# Patient Record
Sex: FEMALE | Race: WHITE | Employment: FULL TIME | URBAN - NONMETROPOLITAN AREA
[De-identification: names, ages, dates, MRNs, and addresses within clinical notes are randomized per-mention and may not be internally consistent; named-entity substitution may affect disease eponyms.]

---

## 2017-03-21 DIAGNOSIS — I10 ESSENTIAL HYPERTENSION WITH GOAL BLOOD PRESSURE LESS THAN 140/90: ICD-10-CM

## 2017-03-21 NOTE — TELEPHONE ENCOUNTER
diovan/losartan      Last Written Prescription Date: 7/20/16  Last Fill Quantity: 90, # refills: 3  Last Office Visit with [facility], [facility] or [facility] prescribing provider: 8/15/16       Potassium   Date Value Ref Range Status   07/20/2016 3.9 3.4 - 5.3 mmol/L Final     Creatinine   Date Value Ref Range Status   07/20/2016 0.75 0.52 - 1.04 mg/dL Final     BP Readings from Last 3 Encounters:   08/15/16 126/78   07/20/16 118/77   02/11/16 137/87

## 2017-03-21 NOTE — TELEPHONE ENCOUNTER
Reason for call:  Medication    1. Medication Name? valsartan-hydrochlorothiazide (DIOVAN-HCT) 80-12.5 MG per tablet  2. Is this request for a refill? No  3. What Pharmacy do you use? [facility]  in [place]  4. Have you contacted your pharmacy? Yes    5. If yes, when?  (Please note that the turn-around-time for prescriptions is 72 business hours; I am sending your request at this time. SEND TO  [facility]  )  Description: Pt called and states that her coverage at [facility] has ended and that she would like her prescription sent to the [facility] in [place], she also states that she is getting the 30 day one and her insurance will not cover it now unless it is a 90 day prescription instead. She said she would like a call back regarding this.   Was an appointment offered for this a call? No   Preferred method for responding to this messageTelephone Cubw-[phone]  If we cannot reach you directly, may we leave a detailed response at the number you provided? Yes  Can this message wait until your PCP/Provider returns if not available today? n/a

## 2017-03-23 RX ORDER — VALSARTAN AND HYDROCHLOROTHIAZIDE 80; 12.5 MG/1; MG/1
1 TABLET, FILM COATED ORAL DAILY
Qty: 90 TABLET | Refills: 0 | Status: SHIPPED | OUTPATIENT
Start: 2017-03-23 | End: 2017-06-21

## 2017-06-21 DIAGNOSIS — I10 ESSENTIAL HYPERTENSION WITH GOAL BLOOD PRESSURE LESS THAN 140/90: ICD-10-CM

## 2017-06-26 RX ORDER — VALSARTAN AND HYDROCHLOROTHIAZIDE 80; 12.5 MG/1; MG/1
TABLET, FILM COATED ORAL
Qty: 90 TABLET | Refills: 0 | Status: SHIPPED | OUTPATIENT
Start: 2017-06-26 | End: 2017-09-21

## 2017-08-10 ENCOUNTER — OFFICE VISIT (OUTPATIENT)
Dept: OBGYN | Facility: OTHER | Age: 47
End: 2017-08-10
Attending: NURSE PRACTITIONER
Payer: COMMERCIAL

## 2017-08-10 VITALS
HEART RATE: 64 BPM | WEIGHT: 220 LBS | TEMPERATURE: 98.1 F | BODY MASS INDEX: 32.58 KG/M2 | DIASTOLIC BLOOD PRESSURE: 84 MMHG | HEIGHT: 69 IN | SYSTOLIC BLOOD PRESSURE: 122 MMHG

## 2017-08-10 DIAGNOSIS — R10.2 PELVIC PAIN IN FEMALE: ICD-10-CM

## 2017-08-10 DIAGNOSIS — R10.9 ABDOMINAL CRAMPS: Primary | ICD-10-CM

## 2017-08-10 DIAGNOSIS — R10.9 ABDOMINAL CRAMPS: ICD-10-CM

## 2017-08-10 LAB
ALBUMIN UR-MCNC: NEGATIVE MG/DL
APPEARANCE UR: CLEAR
BACTERIA #/AREA URNS HPF: ABNORMAL /HPF
BILIRUB UR QL STRIP: NEGATIVE
COLOR UR AUTO: ABNORMAL
GLUCOSE UR STRIP-MCNC: NEGATIVE MG/DL
HGB UR QL STRIP: ABNORMAL
KETONES UR STRIP-MCNC: NEGATIVE MG/DL
LEUKOCYTE ESTERASE UR QL STRIP: NEGATIVE
MUCOUS THREADS #/AREA URNS LPF: PRESENT /LPF
NITRATE UR QL: NEGATIVE
PH UR STRIP: 5.5 PH (ref 4.7–8)
RBC #/AREA URNS AUTO: 1 /HPF (ref 0–2)
SP GR UR STRIP: 1.01 (ref 1–1.03)
URN SPEC COLLECT METH UR: ABNORMAL
UROBILINOGEN UR STRIP-MCNC: NORMAL MG/DL (ref 0–2)
WBC #/AREA URNS AUTO: <1 /HPF (ref 0–2)

## 2017-08-10 PROCEDURE — 99212 OFFICE O/P EST SF 10 MIN: CPT | Performed by: NURSE PRACTITIONER

## 2017-08-10 PROCEDURE — 81001 URINALYSIS AUTO W/SCOPE: CPT | Performed by: NURSE PRACTITIONER

## 2017-08-10 NOTE — MR AVS SNAPSHOT
After Visit Summary   8/10/2017    Paulette Flores    MRN: [id number]           Patient Information     Date Of Birth          1970        Visit Information        Provider Department      8/10/2017 4:15 PM Kathryn Mckeon NP [facility]        Today's Diagnoses     Abdominal cramps    -  1    Pelvic pain in female          Care Instructions      Stages of Endometriosis    The picture to the right shows normal organs. Your healthcare provider can aly where you have growths of endometrial tissue. These are called implants.  Staging depends on certain factors. These include:    Number, size, and site of implants    Presence and extent of adhesions    Involvement of other pelvic organs  The stage may not match the pain you feel. Even early stages can cause a large amount of pain.  The 4 stages are listed below and are based on a complex scoring system.  I - Minimal  II - Mild  III - Moderate  IV - Severe        Date Last Reviewed: 5/10/2015    1405-5052 [employer]. [address] [place] [address]. All rights reserved. This information is not intended as a substitute for professional medical care. Always follow your healthcare professional's instructions.        Treating Endometriosis     You and your healthcare provider may decide that medication is the best treatment for you at this time.     The tissue that lines your uterus is called the endometrium. Endometriosis is growth of this tissue in abnormal places, often outside the uterus. These growths are called endometrial implants. During the menstrual cycle, the endometrium swells. Any implants will swell too. This can cause symptoms, such as pain. The condition can also cause trouble getting pregnant. But endometriosis can be treated. Hormones and surgery are the 2 most common options. Talk to your health care provider about what treatment plan is best for you.  Medical therapy  Estrogen and progesterone are the  main hormones that control your menstrual cycle. Medicines can help control these hormones. This helps limit the swelling of all endometrial tissue. This treatment may be tried instead of surgery. Or, it may be used along with surgery. Some medical therapy prevents a woman from becoming pregnant. Common types of medical therapy include:    GnRH agonists. These hormones stop the body from making estrogen. They help with pain and may be used with low doses of other hormones to help prevent side effects.    Birth control pills. These prevent the hormone levels from fluctuating like they would during a normal menstrual cycle.    Progestins. These are a form of progesterone. They help keep estrogen levels low.    Danazol. This is a weak male hormone. It stops or lowers a woman s production of estrogen and progesterone. This is less commonly used. A nonhormonal form of birth control must be used with this therapy.    Nonsteroidal anti-inflammatory drugs (NSAIDs). These are analgesics that help with pain, but they do not treat the endometriosis.  Surgery  If medical therapy doesn t control the problem, surgery can be done. During surgery, endometrial implants may be removed. This may help women get pregnant if the endometriosis was causing fertility problems. If a woman does not want to get pregnant, in some cases, the uterus may be removed. This is called a hysterectomy. The ovaries may be removed along with the uterus. There are 2 techniques for doing surgery:      Laparoscopy. This is surgery done through small incisions in your stomach. An instrument called a laparoscope (a thin, lighted tube) is used. It is put through 1 of the small incisions. Surgical tools are put through the other small incisions.    Laparotomy. This is surgery done through 1 larger incision in your stomach. It is used to remove large implants that can t be reached with the laparoscope. It may also be used when pelvic organs, such as your bowel,  are involved.  For more information  To learn more, try the sources below:    Womenshealth.gov [phone]    Endometriosis Association [phone]    Endometriosis Research Center [phone]   Date Last Reviewed: 5/10/2015    7441-9860 [employer]. [address] [place] [address]. All rights reserved. This information is not intended as a substitute for professional medical care. Always follow your healthcare professional's instructions.        What Is Endometriosis?  Endometriosis is a problem that affects your reproductive organs and menstrual cycle. It can cause cramps and pain during your periods. Or you may have pelvic pain the whole month. If you have this problem and it s not treated, it can affect your health. But, with early diagnosis and treatment, it can be managed.     The endometrium lines the uterus. It thickens every month to prepare for a pregnancy.         With endometriosis, endometrial tissue grows outside of your uterus.      Understanding endometriosis  With endometriosis, tissue inside the uterus begins to grow where it should not. This endometrial tissue can also grow on the ovaries, bowels, or on the walls of your pelvic cavity. During your menstrual period, this extra tissue swells with blood. The tissue may also release tiny drops of blood. The swelling and blood irritate nearby tissues. This causes pain and cramps. This irritation may cause scar tissue to form. This scar tissue can bind organs together. It can also cause problems getting pregnant (infertility).   Common symptoms  If you have endometriosis, you may have 1 or more of these symptoms:    Cramps and menstrual pain    Pelvic pain    Pain during sexual intercourse    Painful bowel movements    Trouble getting pregnant (infertility)  Treatment options  Treatment may help relieve pain. It may also help restore fertility. Options include medical therapy, surgery, or both. Medicine may also help relieve  some of your symptoms. Talk with your health care provider about these options.  Endometriosis can cause pain during sex. Explore whether sex during certain times of the month is less painful for you. Certain positions may also cause pain. Find out which positions reduce pain for you.   Date Last Reviewed: 5/10/2015    8193-5781 [employer]. [address] [place] [address], [place] [address]. All rights reserved. This information is not intended as a substitute for professional medical care. Always follow your healthcare professional's instructions.        Living with Endometriosis     A hot bath may help relieve pain.    Once you know you have endometriosis, you can think about your options for treatment. Even after treatment, most women have symptoms off and on until menopause. Then, when monthly periods are over for good, symptoms tend to subside or disappear. In the meantime, there is a lot you can do to help yourself feel better.  Help with emotions  Along with cycles of pain, you may have emotional cycles or mood swings. You may feel frustrated, or depressed. Don t suffer in silence. Talking to someone you trust can really help. Also, spend time doing things you enjoy.  Pain control  Heat can help limit pain. Soak in a hot bath or use a heating pad. You may also find relief with yoga, meditation, or acupuncture. Acetaminophen and ibuprofen may also help. Those work best if taken just as pain begins. If needed, you may be given prescription medicine to reduce cramping and pain during periods. Keep track of your symptoms to help you anticipate and cope with the pain.  Nutrition  For some women, making certain changes in their diet seems to reduce symptoms:    Eat less refined sugar and white flour.    Eat more dairy and get adequate vitamin D.    Choose whole-grain breads and cereals.    Eat at least 5 fruits and vegetables each day.    Talk with your health care provider about taking nutritional  supplements.  Pregnancy  Following treatment, many women with endometriosis are able to become pregnant. Some of these women find that being pregnant relieves symptoms -- at least for a while.  Exercise  Frequent exercise can help control your symptoms. Try to exercise about 2 hours and 30 minutes a week. Doing so can help relieve pain, including cramps. Nonimpact choices may offer the most symptom relief. Try walking, swimming, or biking.  Talking about sex  Many women with endometriosis have pain during intercourse. To increase comfort, you may want to try new positions for sex. Some times of the month may be better than others. Also, talk with your partner about other ways you can be intimate. Massage might be a good option for both of you.  Date Last Reviewed: 5/20/2015 2000-2017 [employer]. [address] [place] [address], [place] [address]. All rights reserved. This information is not intended as a substitute for professional medical care. Always follow your healthcare professional's instructions.                Follow-ups after your visit        Who to contact     If you have questions or need follow up information about today's clinic visit or your schedule please contact [facility] directly at [phone].  Normal or non-critical lab and imaging results will be communicated to you by MyChart, letter or phone within 4 business days after the clinic has received the results. If you do not hear from us within 7 days, please contact the clinic through eBuilderhart or phone. If you have a critical or abnormal lab result, we will notify you by phone as soon as possible.  Submit refill requests through Naytev or call your pharmacy and they will forward the refill request to us. Please allow 3 business days for your refill to be completed.          Additional Information About Your Visit        [employer] Information     N[employer] gives you secure access to your electronic health record. If you see a  "primary care provider, you can also send messages to your care team and make appointments. If you have questions, please call your primary care clinic.  If you do not have a primary care provider, please call [phone] and they will assist you.        Care EveryWhere ID     This is your Care EveryWhere ID. This could be used by other organizations to access your [place] medical records  [id number]        Your Vitals Were     Pulse Temperature Height Last Period BMI (Body Mass Index)       64 98.1  F (36.7  C) 5' 9\" (1.753 m) 08/18/2013 32.49 kg/m2        Blood Pressure from Last 3 Encounters:   08/10/17 122/84   08/15/16 126/78   07/20/16 118/77    Weight from Last 3 Encounters:   08/10/17 220 lb (99.8 kg)   08/15/16 250 lb (113.4 kg)   07/20/16 251 lb (113.9 kg)                 Today's Medication Changes          These changes are accurate as of: 8/10/17 11:59 PM.  If you have any questions, ask your nurse or doctor.               Stop taking these medicines if you haven't already. Please contact your care team if you have questions.     nitroFURantoin (macrocrystal-monohydrate) 100 MG capsule   Commonly known as:  MACROBID   Stopped by:  Kathryn Mckeon, ARCELIA                    Primary Care Provider Office Phone # Fax #    Manohar Villareal [phone]       [facility] [address] [facility] [address]        Equal Access to Services     [facility]: Hadii aad ku hadasho Soomaali, waaxda luqadaha, qaybta kaalmada adeegyada, belgica resendez . So [facility] [phone].    ATENCIÓN: Si habla español, tiene a booker disposición servicios gratuitos de asistencia lingüística. Llame al [phone].    We comply with applicable federal civil rights laws and [place] laws. We do not discriminate on the basis of race, color, national origin, age, disability sex, sexual orientation or gender identity.            Thank you!     [facility]. Our goal is always to provide you with excellent care. Hearing back from our patients is one way we can continue to improve our services. Please take a few minutes to complete the written survey that you may receive in the mail after your visit with us. Thank you!             Your Updated Medication List - Protect others around you: Learn how to safely use, store and throw away your medicines at www.disposemymeds.org.          This list is accurate as of: 8/10/17 11:59 PM.  Always use your most recent med list.                   Brand Name Dispense Instructions for use Diagnosis    aspirin 81 MG chewable tablet     90 tablet    Take 1 tablet (81 mg) by mouth daily    Atypical chest pain       CALCIUM 600+D 600-200 MG-UNIT Tabs   Generic drug:  calcium carbonate-vitamin D           Coconut Oil Oil           fish oil-omega-3 fatty acids 1000 MG capsule      Take 2 capsules by mouth daily.        fluticasone 50 MCG/ACT spray    FLONASE    16 g    USE 2 SPRAYS IN EACH NOSTRIL EVERY DAY    Acute recurrent maxillary sinusitis       valsartan-hydrochlorothiazide 80-12.5 MG per tablet    DIOVAN-HCT    90 tablet    TAKE 1 TABLET BY MOUTH DAILY    Essential hypertension with goal blood pressure less than 140/90

## 2017-08-10 NOTE — NURSING NOTE
"Chief Complaint   Patient presents with     Pelvic Pain       Initial /84  Pulse 64  Temp 98.1  F (36.7  C)  Ht 5' 9\" (1.753 m)  Wt 220 lb (99.8 kg)  LMP 08/18/2013  BMI 32.49 kg/m2 Estimated body mass index is 32.49 kg/(m^2) as calculated from the following:    Height as of this encounter: 5' 9\" (1.753 m).    Weight as of this encounter: 220 lb (99.8 kg).  Medication Reconciliation: complete   Mabel Hendrix        "

## 2017-08-11 NOTE — PATIENT INSTRUCTIONS
Stages of Endometriosis    The picture to the right shows normal organs. Your healthcare provider can aly where you have growths of endometrial tissue. These are called implants.  Staging depends on certain factors. These include:    Number, size, and site of implants    Presence and extent of adhesions    Involvement of other pelvic organs  The stage may not match the pain you feel. Even early stages can cause a large amount of pain.  The 4 stages are listed below and are based on a complex scoring system.  I - Minimal  II - Mild  III - Moderate  IV - Severe        Date Last Reviewed: 5/10/2015    6634-5760 [employer]. [address] [place] [address]. All rights reserved. This information is not intended as a substitute for professional medical care. Always follow your healthcare professional's instructions.        Treating Endometriosis     You and your healthcare provider may decide that medication is the best treatment for you at this time.     The tissue that lines your uterus is called the endometrium. Endometriosis is growth of this tissue in abnormal places, often outside the uterus. These growths are called endometrial implants. During the menstrual cycle, the endometrium swells. Any implants will swell too. This can cause symptoms, such as pain. The condition can also cause trouble getting pregnant. But endometriosis can be treated. Hormones and surgery are the 2 most common options. Talk to your health care provider about what treatment plan is best for you.  Medical therapy  Estrogen and progesterone are the main hormones that control your menstrual cycle. Medicines can help control these hormones. This helps limit the swelling of all endometrial tissue. This treatment may be tried instead of surgery. Or, it may be used along with surgery. Some medical therapy prevents a woman from becoming pregnant. Common types of medical therapy include:    GnRH agonists. These hormones  stop the body from making estrogen. They help with pain and may be used with low doses of other hormones to help prevent side effects.    Birth control pills. These prevent the hormone levels from fluctuating like they would during a normal menstrual cycle.    Progestins. These are a form of progesterone. They help keep estrogen levels low.    Danazol. This is a weak male hormone. It stops or lowers a woman s production of estrogen and progesterone. This is less commonly used. A nonhormonal form of birth control must be used with this therapy.    Nonsteroidal anti-inflammatory drugs (NSAIDs). These are analgesics that help with pain, but they do not treat the endometriosis.  Surgery  If medical therapy doesn t control the problem, surgery can be done. During surgery, endometrial implants may be removed. This may help women get pregnant if the endometriosis was causing fertility problems. If a woman does not want to get pregnant, in some cases, the uterus may be removed. This is called a hysterectomy. The ovaries may be removed along with the uterus. There are 2 techniques for doing surgery:      Laparoscopy. This is surgery done through small incisions in your stomach. An instrument called a laparoscope (a thin, lighted tube) is used. It is put through 1 of the small incisions. Surgical tools are put through the other small incisions.    Laparotomy. This is surgery done through 1 larger incision in your stomach. It is used to remove large implants that can t be reached with the laparoscope. It may also be used when pelvic organs, such as your bowel, are involved.  For more information  To learn more, try the sources below:    Womenshealth.gov [phone]    Endometriosis Association [phone]    Endometriosis Research Center [phone]   Date Last Reviewed: 5/10/2015    9759-3065 [employer]. [address] [place] [address], [place] [address]. All rights reserved. This information is not intended as a  substitute for professional medical care. Always follow your healthcare professional's instructions.        What Is Endometriosis?  Endometriosis is a problem that affects your reproductive organs and menstrual cycle. It can cause cramps and pain during your periods. Or you may have pelvic pain the whole month. If you have this problem and it s not treated, it can affect your health. But, with early diagnosis and treatment, it can be managed.     The endometrium lines the uterus. It thickens every month to prepare for a pregnancy.         With endometriosis, endometrial tissue grows outside of your uterus.      Understanding endometriosis  With endometriosis, tissue inside the uterus begins to grow where it should not. This endometrial tissue can also grow on the ovaries, bowels, or on the walls of your pelvic cavity. During your menstrual period, this extra tissue swells with blood. The tissue may also release tiny drops of blood. The swelling and blood irritate nearby tissues. This causes pain and cramps. This irritation may cause scar tissue to form. This scar tissue can bind organs together. It can also cause problems getting pregnant (infertility).   Common symptoms  If you have endometriosis, you may have 1 or more of these symptoms:    Cramps and menstrual pain    Pelvic pain    Pain during sexual intercourse    Painful bowel movements    Trouble getting pregnant (infertility)  Treatment options  Treatment may help relieve pain. It may also help restore fertility. Options include medical therapy, surgery, or both. Medicine may also help relieve some of your symptoms. Talk with your health care provider about these options.  Endometriosis can cause pain during sex. Explore whether sex during certain times of the month is less painful for you. Certain positions may also cause pain. Find out which positions reduce pain for you.   Date Last Reviewed: 5/10/2015    3855-0120 [employer]. [address] [place] [address]  [place] [address]. All rights reserved. This information is not intended as a substitute for professional medical care. Always follow your healthcare professional's instructions.        Living with Endometriosis     A hot bath may help relieve pain.    Once you know you have endometriosis, you can think about your options for treatment. Even after treatment, most women have symptoms off and on until menopause. Then, when monthly periods are over for good, symptoms tend to subside or disappear. In the meantime, there is a lot you can do to help yourself feel better.  Help with emotions  Along with cycles of pain, you may have emotional cycles or mood swings. You may feel frustrated, or depressed. Don t suffer in silence. Talking to someone you trust can really help. Also, spend time doing things you enjoy.  Pain control  Heat can help limit pain. Soak in a hot bath or use a heating pad. You may also find relief with yoga, meditation, or acupuncture. Acetaminophen and ibuprofen may also help. Those work best if taken just as pain begins. If needed, you may be given prescription medicine to reduce cramping and pain during periods. Keep track of your symptoms to help you anticipate and cope with the pain.  Nutrition  For some women, making certain changes in their diet seems to reduce symptoms:    Eat less refined sugar and white flour.    Eat more dairy and get adequate vitamin D.    Choose whole-grain breads and cereals.    Eat at least 5 fruits and vegetables each day.    Talk with your health care provider about taking nutritional supplements.  Pregnancy  Following treatment, many women with endometriosis are able to become pregnant. Some of these women find that being pregnant relieves symptoms -- at least for a while.  Exercise  Frequent exercise can help control your symptoms. Try to exercise about 2 hours and 30 minutes a week. Doing so can help relieve pain, including cramps. Nonimpact choices may  offer the most symptom relief. Try walking, swimming, or biking.  Talking about sex  Many women with endometriosis have pain during intercourse. To increase comfort, you may want to try new positions for sex. Some times of the month may be better than others. Also, talk with your partner about other ways you can be intimate. Massage might be a good option for both of you.  Date Last Reviewed: 5/20/2015 2000-2017 [employer]. [address] [place] [address], [place] [address]. All rights reserved. This information is not intended as a substitute for professional medical care. Always follow your healthcare professional's instructions.

## 2017-08-11 NOTE — PROGRESS NOTES
"[facility]                HPI   Paulette presents today with c/o increasing right sided pelvic pain and bleeding every other month.  She notes that the pain is increasing over time. She is taking 800 mg of ibuprofen every 6 hours with no relief and has been unable to work or go leave the house for 24 hours when this occurs.  Hx of LSH and LSO in 2013.  She did have a work up for similar occurrence in 2015.  Hx of endometriosis and right ovary remaining. Denies pain or bleeding with intercourse.  No change in vaginal discharge or odor.  Denies pain other than noted above.              Medications:     Current Outpatient Prescriptions Ordered in Epic   Medication     valsartan-hydrochlorothiazide (DIOVAN-HCT) 80-12.5 MG per tablet     fluticasone (FLONASE) 50 MCG/ACT nasal spray     Coconut Oil OIL     aspirin 81 MG chewable tablet     fish oil-omega-3 fatty acids (FISH OIL) 1000 MG capsule     calcium carbonate-vitamin D (CALCIUM 600+D) 600-200 MG-UNIT TABS     No current Epic-ordered facility-administered medications on file.                 Allergies:   Ace inhibitors; Penicillins; Sulfa drugs; and Adhesive tape         Review of Systems:   The 5 point Review of Systems is negative other than noted in the HPI                     Physical Exam:   Blood pressure 122/84, pulse 64, temperature 98.1  F (36.7  C), height 5' 9\" (1.753 m), weight 220 lb (99.8 kg), last menstrual period 08/18/2013, not currently breastfeeding.  Constitutional:   awake, alert, cooperative, no apparent distress, and appears stated age                Data:     Pelvic US of right ovary ordered            Assessment and Plan:   Right sided, cyclical pelvic pain - discussed this is likely due to endometriosis but will do an US and compare to occurrence in 2015.  We have discussed a trial of Depo Provera which she has declined at this time.  She would like to discuss removal of the right ovary.  Will obtain US and schedule with /dr." Norberto to discuss options.       LATOYA Knowles  8/11/2017  8:18 AM

## 2017-08-16 ENCOUNTER — HOSPITAL ENCOUNTER (OUTPATIENT)
Dept: ULTRASOUND IMAGING | Facility: HOSPITAL | Age: 47
Discharge: HOME | End: 2017-08-16
Attending: NURSE PRACTITIONER | Admitting: NURSE PRACTITIONER
Payer: COMMERCIAL

## 2017-08-16 PROCEDURE — 76856 US EXAM PELVIC COMPLETE: CPT | Mod: TC

## 2017-08-16 PROCEDURE — 76830 TRANSVAGINAL US NON-OB: CPT | Mod: TC

## 2017-08-18 NOTE — TELEPHONE ENCOUNTER
[facility]      Last Written Prescription Date: 3/23/2017  Last Fill Quantity: 90, # refills: 0  Last Office Visit with [facility] or [facility] prescribing provider: 8/15/2016       Potassium   Date Value Ref Range Status   07/20/2016 3.9 3.4 - 5.3 mmol/L Final     Creatinine   Date Value Ref Range Status   07/20/2016 0.75 0.52 - 1.04 mg/dL Final     BP Readings from Last 3 Encounters:   08/15/16 126/78   07/20/16 118/77   02/11/16 137/87        Yes

## 2017-09-21 DIAGNOSIS — I10 ESSENTIAL HYPERTENSION WITH GOAL BLOOD PRESSURE LESS THAN 140/90: ICD-10-CM

## 2017-09-21 RX ORDER — VALSARTAN AND HYDROCHLOROTHIAZIDE 80; 12.5 MG/1; MG/1
TABLET, FILM COATED ORAL
Qty: 30 TABLET | Refills: 0 | Status: SHIPPED | OUTPATIENT
Start: 2017-09-21 | End: 2017-09-26

## 2017-09-21 NOTE — LETTER
[facility]  [address] [place] [address]   [place] [address]  [phone]        Paulette Flores  [address]  [facility] [id number] September 21, 2017       Dear Paulette Flores,    APPOINTMENT REMINDER:       Our record indicates that it is time for you to be seen for an annual preventative exam.    You may call our office at [phone] to schedule an appointment.    Please disregard this notice if you have already made an appointment.      Sincerely,    Manohar Villareal MD  Family Practice

## 2017-09-26 RX ORDER — VALSARTAN AND HYDROCHLOROTHIAZIDE 80; 12.5 MG/1; MG/1
1 TABLET, FILM COATED ORAL DAILY
Qty: 90 TABLET | Refills: 0 | Status: SHIPPED | OUTPATIENT
Start: 2017-09-26 | End: 2017-11-14

## 2017-10-02 ENCOUNTER — TELEPHONE (OUTPATIENT)
Dept: OBGYN | Facility: OTHER | Age: 47
End: 2017-10-02

## 2017-10-02 DIAGNOSIS — R10.2 PELVIC PAIN IN FEMALE: Primary | ICD-10-CM

## 2017-10-02 NOTE — TELEPHONE ENCOUNTER
Pt has an appt with Dr Thornton on 10/12/17.  She would like a Rx for pain until then.    Please call   [phone]

## 2017-10-03 RX ORDER — TRAMADOL HYDROCHLORIDE 50 MG/1
50 TABLET ORAL EVERY 8 HOURS PRN
Qty: 20 TABLET | Refills: 0 | Status: SHIPPED | OUTPATIENT
Start: 2017-10-03 | End: 2017-10-13

## 2017-10-03 NOTE — TELEPHONE ENCOUNTER
Called patient and notified her of a prescription for Ultram. Per patient request she would like this brought up to [facility]. Walked up to [facility].

## 2017-10-13 ENCOUNTER — OFFICE VISIT (OUTPATIENT)
Dept: OBGYN | Facility: OTHER | Age: 47
End: 2017-10-13
Attending: OBSTETRICS & GYNECOLOGY
Payer: COMMERCIAL

## 2017-10-13 VITALS
SYSTOLIC BLOOD PRESSURE: 131 MMHG | DIASTOLIC BLOOD PRESSURE: 87 MMHG | HEIGHT: 69 IN | HEART RATE: 80 BPM | BODY MASS INDEX: 35.55 KG/M2 | WEIGHT: 240 LBS | OXYGEN SATURATION: 97 %

## 2017-10-13 DIAGNOSIS — R10.2 PELVIC PAIN IN FEMALE: ICD-10-CM

## 2017-10-13 DIAGNOSIS — N80.9 ENDOMETRIOSIS: ICD-10-CM

## 2017-10-13 DIAGNOSIS — Z12.4 SCREENING FOR CERVICAL CANCER: Primary | ICD-10-CM

## 2017-10-13 PROCEDURE — 99000 SPECIMEN HANDLING OFFICE-LAB: CPT | Performed by: OBSTETRICS & GYNECOLOGY

## 2017-10-13 PROCEDURE — 87624 HPV HI-RISK TYP POOLED RSLT: CPT | Mod: 90 | Performed by: OBSTETRICS & GYNECOLOGY

## 2017-10-13 PROCEDURE — 99214 OFFICE O/P EST MOD 30 MIN: CPT | Mod: 57 | Performed by: OBSTETRICS & GYNECOLOGY

## 2017-10-13 PROCEDURE — 88142 CYTOPATH C/V THIN LAYER: CPT | Performed by: OBSTETRICS & GYNECOLOGY

## 2017-10-13 ASSESSMENT — PAIN SCALES - GENERAL: PAINLEVEL: NO PAIN (0)

## 2017-10-13 NOTE — PROGRESS NOTES
CC:  Consult from Kathryn Mckeon NP for cyclical  Pelvic pain and spotting.  HPI:  Paulette Flores is a 46 year old female is a   P2 with prior LSH and LSO for pelvic pain/endometriosis, AUB in 2013.  For the last year she has had cyclical pelvic pain on right every other month lasting 2 days.  Alos associated spotting.  Pain is severe like labor.    Location: right side  Aggravating factors none  Alleviating factors: tramadol  Radiation yes to back  Prior treatment/tests yes  Recent US nml    Patients records are available and reviewed at today's visit.    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     Infections of genitourinary tract antepartum 05/27/2009     Metrorrhagia 12/06/2001     Migraine, unspecified, without mention of intractable migraine without mention of status migrainosus 02/23/2001    oral contraceptive related     Pain in limb 08/22/2002     Unspecified disorder of female genital organs 12/13/2006     Unspecified hemorrhage in early pregnancy, antepartum 12/04/2006       Past Surgical History:   Procedure Laterality Date     BREAST SURGERY       CHOLECYSTECTOMY       GYN SURGERY      laparoscopic supracervical hysterectomy,left tube and ovary removal,fulguration of endometriosis 8/28/13--[facility] [place]     LAPAROSCOPIC HYSTERECTOMY SUPRACERVICAL, BILATERAL SALPINGO-OOPHORECTOMY, COMBINED  8/28/2013    Procedure: COMBINED LAPAROSCOPIC HYSTERECTOMY SUPRACERVICAL, SALPINGO-OOPHORECTOMY;  LAPAROSCOPIC SUPRACERVICAL HYSTERECTOMY/LAPAROSCOPIC LEFT SALPINGO-OOPHORECTOMY;  Surgeon: Jerrell Thornton MD;  Location: [facility]     LAPAROSCOPY DIAGNOSTIC (GENERAL)       WISDOM TEETH         Family History   Problem Relation  "Age of Onset     CANCER Maternal Aunt      Cervical     Hypertension Maternal Grandmother      Myocardial Infarction Paternal Grandfather 45     MI, cause of death     Myocardial Infarction Father      OSTEOPOROSIS Maternal Grandmother        Allergies: Ace inhibitors; Penicillins; Sulfa drugs; and Adhesive tape    Current Outpatient Prescriptions   Medication Sig Dispense Refill     valsartan-hydrochlorothiazide (DIOVAN-HCT) 80-12.5 MG per tablet Take 1 tablet by mouth daily 90 tablet 0     fluticasone (FLONASE) 50 MCG/ACT nasal spray USE 2 SPRAYS IN EACH NOSTRIL EVERY DAY 16 g 2     Coconut Oil OIL        aspirin 81 MG chewable tablet Take 1 tablet (81 mg) by mouth daily 90 tablet 3     fish oil-omega-3 fatty acids (FISH OIL) 1000 MG capsule Take 2 capsules by mouth daily.       calcium carbonate-vitamin D (CALCIUM 600+D) 600-200 MG-UNIT TABS        traMADol (ULTRAM) 50 MG tablet Take 1 tablet (50 mg) by mouth every 8 hours as needed for severe pain or pain (Patient not taking: Reported on 10/13/2017) 20 tablet 0         ROS:  C: NEGATIVE for fever, chills, change in weight  GI: NEGATIVE for nausea, abdominal pain, heartburn, or change in bowel habits  : NEGATIVE for frequency, dysuria, hematuria, vaginal discharge, or irregular bleeding  P: NEGATIVE for changes in mood or affect    EXAM:  Blood pressure 131/87, pulse 80, height 5' 9\" (1.753 m), weight 240 lb (108.9 kg), last menstrual period 08/18/2013, SpO2 97 %, not currently breastfeeding.   BMI= Body mass index is 35.44 kg/(m^2).  General - pleasant female in no acute distress.   Neurological -  mental status normal.  Alert and oriented.  Abdomen - soft, nontender, nondistended, no hepatosplenomegaly.  Pelvic - EG: normal adult female, BUS: within normal limits, Vagina: without lesions or  discharge, Cervix: no lesions or CMT.  Adnexae: no masses or tenderness.  Anus without lesions  Rectovaginal - deferred.  Ext:  No edema  Neurological -  mental status " normal.  Alert and oriented.  Pap smear done:  Yes  Cervical cultures:No      ASSESSMENT/PLAN:  Recurrent endometriosis/pelvic pain/metrorrhagia  Discussed medical (Depo-Lupron) and surgical treatments (laparascopic RSO) with pt who desires definitive surgical management.  Menopausal risks and SE's dicussed.  Reviewed goals, risks, alternatives for planned procedure.  Including risk of bleeding, infection, damage to nerves, blood vessels, bowel and bladder. Discussed recovery period and expected discomfort.. All questions were answered.Preoperative instructions discussed.  NPO after midnight.  25 minutes were spent with the patient with greater than 50% of the visit spent in face-to-face counseling and coordination of care.          Jerrell Thornton MD

## 2017-10-13 NOTE — MR AVS SNAPSHOT
After Visit Summary   10/13/2017    Paulette Flores    MRN: [id number]           Patient Information     Date Of Birth          1970        Visit Information        Provider Department      10/13/2017 12:00 PM Jerrell Thornton MD [facility] [place]        Today's Diagnoses     Screening for cervical cancer    -  1    Pelvic pain in female        Endometriosis          Care Instructions    Nothing to eat or drink after midnight prior to procedure.            Follow-ups after your visit        Your next 10 appointments already scheduled     Nov 14, 2017  2:00 PM CST   (Arrive by 1:45 PM)   Pre-Op physical with Manohar Villareal MD   [facility] [place] ([facility] )    [address]  [place] [address]   [phone]              Who to contact     If you have questions or need follow up information about today's clinic visit or your schedule please contact [facility] [place] directly at [phone].  Normal or non-critical lab and imaging results will be communicated to you by MyChart, letter or phone within 4 business days after the clinic has received the results. If you do not hear from us within 7 days, please contact the clinic through [employer] or phone. If you have a critical or abnormal lab result, we will notify you by phone as soon as possible.  Submit refill requests through Klinq or call your pharmacy and they will forward the refill request to us. Please allow 3 business days for your refill to be completed.          Additional Information About Your Visit        MyChart Information     Klinq gives you secure access to your electronic health record. If you see a primary care provider, you can also send messages to your care team and make appointments. If you have questions, please call your primary care clinic.  If you do not have a primary care provider, please call [phone] and they will assist you.        Care EveryWhere ID     This is your  "Care EveryWhere ID. This could be used by other organizations to access your [facility] medical records  [id number]        Your Vitals Were     Pulse Height Last Period Pulse Oximetry BMI (Body Mass Index)       80 5' 9\" (1.753 m) 08/18/2013 97% 35.44 kg/m2        Blood Pressure from Last 3 Encounters:   10/13/17 131/87   08/10/17 122/84   08/15/16 126/78    Weight from Last 3 Encounters:   10/13/17 240 lb (108.9 kg)   08/10/17 220 lb (99.8 kg)   08/15/16 250 lb (113.4 kg)              We Performed the Following     A pap thin layer screen with  HPV - recommended age 30 - 65 years (select HPV order below)     HPV High Risk Types DNA Cervical        Primary Care Provider Office Phone # Fax #    Manohar Villareal [phone]       [facility] [address]  [place] [address]        Equal Access to Services     [facility] : Hadii aad ku hadasho Soomaali, waaxda luqadaha, qaybta kaalmada adeegyada, waxay roryin haymookie resendez . So [facility] [phone].    ATENCIÓN: Si habla español, tiene a booker disposición servicios gratuitos de asistencia lingüística. Llame al [phone].    We comply with applicable federal civil rights laws and [place] laws. We do not discriminate on the basis of race, color, national origin, age, disability, sex, sexual orientation, or gender identity.            Thank you!     Thank you for choosing [facility] [place]  for your care. Our goal is always to provide you with excellent care. Hearing back from our patients is one way we can continue to improve our services. Please take a few minutes to complete the written survey that you may receive in the mail after your visit with us. Thank you!             Your Updated Medication List - Protect others around you: Learn how to safely use, store and throw away your medicines at www.disposemymeds.org.          This list is accurate as of: 10/13/17 12:43 PM.  Always use your most recent med list.       "             Brand Name Dispense Instructions for use Diagnosis    aspirin 81 MG chewable tablet     90 tablet    Take 1 tablet (81 mg) by mouth daily    Atypical chest pain       CALCIUM 600+D 600-200 MG-UNIT Tabs   Generic drug:  calcium carbonate-vitamin D           Coconut Oil Oil           fish oil-omega-3 fatty acids 1000 MG capsule      Take 2 capsules by mouth daily.        fluticasone 50 MCG/ACT spray    FLONASE    16 g    USE 2 SPRAYS IN EACH NOSTRIL EVERY DAY    Acute recurrent maxillary sinusitis       traMADol 50 MG tablet    ULTRAM    20 tablet    Take 1 tablet (50 mg) by mouth every 8 hours as needed for severe pain or pain    Pelvic pain in female       valsartan-hydrochlorothiazide 80-12.5 MG per tablet    DIOVAN-HCT    90 tablet    Take 1 tablet by mouth daily    Essential hypertension with goal blood pressure less than 140/90

## 2017-10-13 NOTE — NURSING NOTE
"Chief Complaint   Patient presents with     Consult     Linda/ endometriosis/ right ovarian pain       Initial /87 (BP Location: Left arm, Cuff Size: Adult Large)  Pulse 80  Ht 5' 9\" (1.753 m)  Wt 240 lb (108.9 kg)  LMP 08/18/2013  SpO2 97%  BMI 35.44 kg/m2 Estimated body mass index is 35.44 kg/(m^2) as calculated from the following:    Height as of this encounter: 5' 9\" (1.753 m).    Weight as of this encounter: 240 lb (108.9 kg).  Medication Reconciliation: complete     Sharla Jo      "

## 2017-10-19 LAB
COPATH REPORT: NORMAL
PAP: NORMAL

## 2017-10-20 DIAGNOSIS — N80.9 ENDOMETRIOSIS: ICD-10-CM

## 2017-10-20 DIAGNOSIS — R10.2 PELVIC PAIN IN FEMALE: Primary | ICD-10-CM

## 2017-10-23 LAB
FINAL DIAGNOSIS: NORMAL
HPV HR 12 DNA CVX QL NAA+PROBE: NEGATIVE
HPV16 DNA SPEC QL NAA+PROBE: NEGATIVE
HPV18 DNA SPEC QL NAA+PROBE: NEGATIVE
SPECIMEN DESCRIPTION: NORMAL

## 2017-11-14 ENCOUNTER — OFFICE VISIT (OUTPATIENT)
Dept: FAMILY MEDICINE | Facility: OTHER | Age: 47
End: 2017-11-14
Attending: FAMILY MEDICINE
Payer: COMMERCIAL

## 2017-11-14 VITALS
HEIGHT: 69 IN | HEART RATE: 83 BPM | DIASTOLIC BLOOD PRESSURE: 62 MMHG | OXYGEN SATURATION: 96 % | SYSTOLIC BLOOD PRESSURE: 128 MMHG | TEMPERATURE: 99 F | WEIGHT: 277.2 LBS | BODY MASS INDEX: 41.06 KG/M2 | RESPIRATION RATE: 16 BRPM

## 2017-11-14 DIAGNOSIS — R10.2 PELVIC PAIN IN FEMALE: ICD-10-CM

## 2017-11-14 DIAGNOSIS — Z01.818 PRE-OPERATIVE GENERAL PHYSICAL EXAMINATION: Primary | ICD-10-CM

## 2017-11-14 DIAGNOSIS — I10 ESSENTIAL HYPERTENSION WITH GOAL BLOOD PRESSURE LESS THAN 140/90: ICD-10-CM

## 2017-11-14 DIAGNOSIS — Z01.818 PREOP GENERAL PHYSICAL EXAM: ICD-10-CM

## 2017-11-14 LAB
ANION GAP SERPL CALCULATED.3IONS-SCNC: 8 MMOL/L (ref 3–14)
BASOPHILS # BLD AUTO: 0.1 10E9/L (ref 0–0.2)
BASOPHILS NFR BLD AUTO: 0.7 %
BUN SERPL-MCNC: 12 MG/DL (ref 7–30)
CALCIUM SERPL-MCNC: 8.7 MG/DL (ref 8.5–10.1)
CHLORIDE SERPL-SCNC: 105 MMOL/L (ref 94–109)
CO2 SERPL-SCNC: 26 MMOL/L (ref 20–32)
CREAT SERPL-MCNC: 0.74 MG/DL (ref 0.52–1.04)
DIFFERENTIAL METHOD BLD: NORMAL
EOSINOPHIL # BLD AUTO: 0.3 10E9/L (ref 0–0.7)
EOSINOPHIL NFR BLD AUTO: 3.9 %
ERYTHROCYTE [DISTWIDTH] IN BLOOD BY AUTOMATED COUNT: 13.2 % (ref 10–15)
GFR SERPL CREATININE-BSD FRML MDRD: 85 ML/MIN/1.7M2
GLUCOSE SERPL-MCNC: 129 MG/DL (ref 70–99)
HCT VFR BLD AUTO: 40.7 % (ref 35–47)
HGB BLD-MCNC: 13.6 G/DL (ref 11.7–15.7)
IMM GRANULOCYTES # BLD: 0 10E9/L (ref 0–0.4)
IMM GRANULOCYTES NFR BLD: 0.4 %
LYMPHOCYTES # BLD AUTO: 1.8 10E9/L (ref 0.8–5.3)
LYMPHOCYTES NFR BLD AUTO: 21 %
MCH RBC QN AUTO: 29.8 PG (ref 26.5–33)
MCHC RBC AUTO-ENTMCNC: 33.4 G/DL (ref 31.5–36.5)
MCV RBC AUTO: 89 FL (ref 78–100)
MONOCYTES # BLD AUTO: 0.6 10E9/L (ref 0–1.3)
MONOCYTES NFR BLD AUTO: 7.4 %
NEUTROPHILS # BLD AUTO: 5.7 10E9/L (ref 1.6–8.3)
NEUTROPHILS NFR BLD AUTO: 66.6 %
NRBC # BLD AUTO: 0 10*3/UL
NRBC BLD AUTO-RTO: 0 /100
PLATELET # BLD AUTO: 229 10E9/L (ref 150–450)
POTASSIUM SERPL-SCNC: 3.9 MMOL/L (ref 3.4–5.3)
RBC # BLD AUTO: 4.57 10E12/L (ref 3.8–5.2)
SODIUM SERPL-SCNC: 139 MMOL/L (ref 133–144)
WBC # BLD AUTO: 8.5 10E9/L (ref 4–11)

## 2017-11-14 PROCEDURE — 99214 OFFICE O/P EST MOD 30 MIN: CPT | Performed by: FAMILY MEDICINE

## 2017-11-14 PROCEDURE — 36415 COLL VENOUS BLD VENIPUNCTURE: CPT | Performed by: FAMILY MEDICINE

## 2017-11-14 PROCEDURE — 93000 ELECTROCARDIOGRAM COMPLETE: CPT | Performed by: INTERNAL MEDICINE

## 2017-11-14 PROCEDURE — 85025 COMPLETE CBC W/AUTO DIFF WBC: CPT | Performed by: FAMILY MEDICINE

## 2017-11-14 PROCEDURE — 80048 BASIC METABOLIC PNL TOTAL CA: CPT | Performed by: FAMILY MEDICINE

## 2017-11-14 RX ORDER — VALSARTAN AND HYDROCHLOROTHIAZIDE 80; 12.5 MG/1; MG/1
1 TABLET, FILM COATED ORAL DAILY
Qty: 90 TABLET | Refills: 3 | Status: SHIPPED | OUTPATIENT
Start: 2017-11-14 | End: 2018-12-08

## 2017-11-14 ASSESSMENT — ANXIETY QUESTIONNAIRES
7. FEELING AFRAID AS IF SOMETHING AWFUL MIGHT HAPPEN: NOT AT ALL
5. BEING SO RESTLESS THAT IT IS HARD TO SIT STILL: NOT AT ALL
1. FEELING NERVOUS, ANXIOUS, OR ON EDGE: NOT AT ALL
6. BECOMING EASILY ANNOYED OR IRRITABLE: NOT AT ALL
2. NOT BEING ABLE TO STOP OR CONTROL WORRYING: NOT AT ALL
3. WORRYING TOO MUCH ABOUT DIFFERENT THINGS: NOT AT ALL
GAD7 TOTAL SCORE: 0
4. TROUBLE RELAXING: NOT AT ALL

## 2017-11-14 ASSESSMENT — PAIN SCALES - GENERAL: PAINLEVEL: NO PAIN (0)

## 2017-11-14 ASSESSMENT — PATIENT HEALTH QUESTIONNAIRE - PHQ9: SUM OF ALL RESPONSES TO PHQ QUESTIONS 1-9: 0

## 2017-11-14 NOTE — PATIENT INSTRUCTIONS
Before Your Surgery      Call your surgeon if there is any change in your health. This includes signs of a cold or flu (such as a sore throat, runny nose, cough, rash or fever).    Do not smoke, drink alcohol or take over the counter medicine (unless your surgeon or primary care doctor tells you to) for the 24 hours before and after surgery.    If you take prescribed drugs: Follow your doctor s orders about which medicines to take and which to stop until after surgery.    Eating and drinking prior to surgery: follow the instructions from your surgeon    Take a shower or bath the night before surgery. Use the soap your surgeon gave you to gently clean your skin. If you do not have soap from your surgeon, use your regular soap. Do not shave or scrub the surgery site.  Wear clean pajamas and have clean sheets on your bed.     Results for orders placed or performed in visit on 11/14/17 (from the past 24 hour(s))   CBC with platelets differential   Result Value Ref Range    WBC 8.5 4.0 - 11.0 10e9/L    RBC Count 4.57 3.8 - 5.2 10e12/L    Hemoglobin 13.6 11.7 - 15.7 g/dL    Hematocrit 40.7 35.0 - 47.0 %    MCV 89 78 - 100 fl    MCH 29.8 26.5 - 33.0 pg    MCHC 33.4 31.5 - 36.5 g/dL    RDW 13.2 10.0 - 15.0 %    Platelet Count 229 150 - 450 10e9/L    Diff Method Automated Method     % Neutrophils 66.6 %    % Lymphocytes 21.0 %    % Monocytes 7.4 %    % Eosinophils 3.9 %    % Basophils 0.7 %    % Immature Granulocytes 0.4 %    Nucleated RBCs 0 0 /100    Absolute Neutrophil 5.7 1.6 - 8.3 10e9/L    Absolute Lymphocytes 1.8 0.8 - 5.3 10e9/L    Absolute Monocytes 0.6 0.0 - 1.3 10e9/L    Absolute Eosinophils 0.3 0.0 - 0.7 10e9/L    Absolute Basophils 0.1 0.0 - 0.2 10e9/L    Abs Immature Granulocytes 0.0 0 - 0.4 10e9/L    Absolute Nucleated RBC 0.0    Basic metabolic panel   Result Value Ref Range    Sodium 139 133 - 144 mmol/L    Potassium 3.9 3.4 - 5.3 mmol/L    Chloride 105 94 - 109 mmol/L    Carbon Dioxide 26 20 - 32 mmol/L     Anion Gap 8 3 - 14 mmol/L    Glucose 129 (H) 70 - 99 mg/dL    Urea Nitrogen 12 7 - 30 mg/dL    Creatinine 0.74 0.52 - 1.04 mg/dL    GFR Estimate 85 >60 mL/min/1.7m2    GFR Estimate If Black >90 >60 mL/min/1.7m2    Calcium 8.7 8.5 - 10.1 mg/dL

## 2017-11-14 NOTE — NURSING NOTE
"Chief Complaint   Patient presents with     Pre-Op Exam       Initial /62  Pulse 83  Temp 99  F (37.2  C)  Resp 16  Ht 5' 9\" (1.753 m)  Wt 277 lb 3.2 oz (125.7 kg)  LMP 08/18/2013  SpO2 96%  BMI 40.94 kg/m2 Estimated body mass index is 40.94 kg/(m^2) as calculated from the following:    Height as of this encounter: 5' 9\" (1.753 m).    Weight as of this encounter: 277 lb 3.2 oz (125.7 kg).  Medication Reconciliation: complete     Joyce Merida      "

## 2017-11-14 NOTE — MR AVS SNAPSHOT
After Visit Summary   11/14/2017    Paulette Flores    MRN: [id number]           Patient Information     Date Of Birth          1970        Visit Information        Provider Department      11/14/2017 2:00 PM Manohar Villareal MD [facility]        Today's Diagnoses     Pre-operative general physical examination    -  1    Pelvic pain in female        Essential hypertension with goal blood pressure less than 140/90        Preop general physical exam          Care Instructions      Before Your Surgery      Call your surgeon if there is any change in your health. This includes signs of a cold or flu (such as a sore throat, runny nose, cough, rash or fever).    Do not smoke, drink alcohol or take over the counter medicine (unless your surgeon or primary care doctor tells you to) for the 24 hours before and after surgery.    If you take prescribed drugs: Follow your doctor s orders about which medicines to take and which to stop until after surgery.    Eating and drinking prior to surgery: follow the instructions from your surgeon    Take a shower or bath the night before surgery. Use the soap your surgeon gave you to gently clean your skin. If you do not have soap from your surgeon, use your regular soap. Do not shave or scrub the surgery site.  Wear clean pajamas and have clean sheets on your bed.     Results for orders placed or performed in visit on 11/14/17 (from the past 24 hour(s))   CBC with platelets differential   Result Value Ref Range    WBC 8.5 4.0 - 11.0 10e9/L    RBC Count 4.57 3.8 - 5.2 10e12/L    Hemoglobin 13.6 11.7 - 15.7 g/dL    Hematocrit 40.7 35.0 - 47.0 %    MCV 89 78 - 100 fl    MCH 29.8 26.5 - 33.0 pg    MCHC 33.4 31.5 - 36.5 g/dL    RDW 13.2 10.0 - 15.0 %    Platelet Count 229 150 - 450 10e9/L    Diff Method Automated Method     % Neutrophils 66.6 %    % Lymphocytes 21.0 %    % Monocytes 7.4 %    % Eosinophils 3.9 %    % Basophils 0.7 %    % Immature Granulocytes  0.4 %    Nucleated RBCs 0 0 /100    Absolute Neutrophil 5.7 1.6 - 8.3 10e9/L    Absolute Lymphocytes 1.8 0.8 - 5.3 10e9/L    Absolute Monocytes 0.6 0.0 - 1.3 10e9/L    Absolute Eosinophils 0.3 0.0 - 0.7 10e9/L    Absolute Basophils 0.1 0.0 - 0.2 10e9/L    Abs Immature Granulocytes 0.0 0 - 0.4 10e9/L    Absolute Nucleated RBC 0.0    Basic metabolic panel   Result Value Ref Range    Sodium 139 133 - 144 mmol/L    Potassium 3.9 3.4 - 5.3 mmol/L    Chloride 105 94 - 109 mmol/L    Carbon Dioxide 26 20 - 32 mmol/L    Anion Gap 8 3 - 14 mmol/L    Glucose 129 (H) 70 - 99 mg/dL    Urea Nitrogen 12 7 - 30 mg/dL    Creatinine 0.74 0.52 - 1.04 mg/dL    GFR Estimate 85 >60 mL/min/1.7m2    GFR Estimate If Black >90 >60 mL/min/1.7m2    Calcium 8.7 8.5 - 10.1 mg/dL               Follow-ups after your visit        Your next 10 appointments already scheduled     Nov 22, 2017   Procedure with Jerrell Thornton MD   [facility] ([address] [address] [phone]              Who to contact     If you have questions or need follow up information about today's clinic visit or your schedule please contact [facility] directly at [phone].  Normal or non-critical lab and imaging results will be communicated to you by [employer]hart, letter or phone within 4 business days after the clinic has received the results. If you do not hear from us within 7 days, please contact the clinic through igobubblet or phone. If you have a critical or abnormal lab result, we will notify you by phone as soon as possible.  Submit refill requests through SABIA or call your pharmacy and they will forward the refill request to us. Please allow 3 business days for your refill to be completed.          Additional Information About Your Visit        HealthFusionharFortressware Information     SABIA gives you secure access to your electronic health record. If you see a primary care provider, you can also send messages  "to your care team and make appointments. If you have questions, please call your primary care clinic.  If you do not have a primary care provider, please call [phone] and they will assist you.        Care EveryWhere ID     This is your Care EveryWhere ID. This could be used by other organizations to access your [facility] medical records  [id number]        Your Vitals Were     Pulse Temperature Respirations Height Last Period Pulse Oximetry    83 99  F (37.2  C) 16 5' 9\" (1.753 m) 08/18/2013 96%    BMI (Body Mass Index)                   40.94 kg/m2            Blood Pressure from Last 3 Encounters:   11/14/17 128/62   10/13/17 131/87   08/10/17 122/84    Weight from Last 3 Encounters:   11/14/17 277 lb 3.2 oz (125.7 kg)   10/13/17 240 lb (108.9 kg)   08/10/17 220 lb (99.8 kg)              We Performed the Following     Basic metabolic panel     CBC with platelets differential          Today's Medication Changes          These changes are accurate as of: 11/14/17  2:44 PM.  If you have any questions, ask your nurse or doctor.               Stop taking these medicines if you haven't already. Please contact your care team if you have questions.     CALCIUM 600+D 600-200 MG-UNIT Tabs   Generic drug:  calcium carbonate-vitamin D   Stopped by:  Manohar Villareal MD           fish oil-omega-3 fatty acids 1000 MG capsule   Stopped by:  Manohar Villareal MD           fluticasone 50 MCG/ACT spray   Commonly known as:  FLONASE   Stopped by:  Manohar Villareal MD                Where to get your medicines      These medications were sent to [place] [id number] [place] [address]  [address] [place] [address]     Phone:  [phone]     valsartan-hydrochlorothiazide 80-12.5 MG per tablet                Primary Care Provider Office Phone # Fax #    Manohar Villareal [phone]       [facility] [address] [address]        Equal Access to Services     [facility] " GAAR : Hadii basim li oralia Velarde, wadarnellda luqadaha, qaybta kadino lijaderuiz, waxdavid jay haymookie maycarrollrogelio siegel. So [facility] [phone].    ATENCIÓN: Si habla español, tiene a booker disposición servicios gratuitos de asistencia lingüística. Llame al [phone].    We comply with applicable federal civil rights laws and [place] laws. We do not discriminate on the basis of race, color, national origin, age, disability, sex, sexual orientation, or gender identity.            Thank you!     [facility] you for choosing [facility]  [facility] your [facility]. Our goal is always to provide you with excellent care. Hearing back from our patients is one way we can continue to improve our services. Please take a few minutes to complete the written survey that you may receive in the mail after your visit with us. Thank you!             Your Updated Medication List - Protect others around you: Learn how to safely use, store and throw away your medicines at www.disposemymeds.org.          This list is accurate as of: 11/14/17  2:44 PM.  Always use your most recent med list.                   Brand Name Dispense Instructions for use Diagnosis    aspirin 81 MG chewable tablet     90 tablet    Take 1 tablet (81 mg) by mouth daily    Atypical chest pain       Coconut Oil Oil           valsartan-hydrochlorothiazide 80-12.5 MG per tablet    DIOVAN-HCT    90 tablet    Take 1 tablet by mouth daily    Essential hypertension with goal blood pressure less than 140/90

## 2017-11-14 NOTE — PROGRESS NOTES
[facility] [place]  [address]  [place] [address]  [phone]  Dept: [phone]    PRE-OP EVALUATION:  Today's date: 2017    Paulette Flores (: 1970) presents for pre-operative evaluation assessment as requested by Dr. Thornton.  She requires evaluation and anesthesia risk assessment prior to undergoing surgery/procedure for treatment of Laparoscopic salpingiogram .  Proposed procedure: Laparoscopic Salpinginiogram     Date of Surgery/ Procedure: 17  Time of Surgery/ Procedure: [facility]  Hospital/Surgical Facility: [facility] [place]    Primary Physician: Manohar Villareal  Type of Anesthesia Anticipated: General    Patient has a Health Care Directive or Living Will:  NO    1. NO - Do you have a history of heart attack, stroke, stent, bypass or surgery on an artery in the head, neck, heart or legs?  2. NO - Do you ever have any pain or discomfort in your chest?  3. NO - Do you have a history of  Heart Failure?  4. NO - Are you troubled by shortness of breath when: walking on the level, up a slight hill or at night?  5. NO - Do you currently have a cold, bronchitis or other respiratory infection?  6. NO - Do you have a cough, shortness of breath or wheezing?  7. NO - Do you sometimes get pains in the calves of your legs when you walk?  8. NO - Do you or anyone in your family have previous history of blood clots?  9. NO - Do you or does anyone in your family have a serious bleeding problem such as prolonged bleeding following surgeries or cuts?  10. NO - Have you ever had problems with anemia or been told to take iron pills?  11. NO - Have you had any abnormal blood loss such as black, tarry or bloody stools, or abnormal vaginal bleeding?  12. NO - Have you ever had a blood transfusion?  13. NO - Have you or any of your relatives ever had problems with anesthesia?  14. NO - Do you have sleep apnea, excessive snoring or daytime drowsiness?  15. NO - Do you have any prosthetic heart valves?  16. NO - Do you  have prosthetic joints?  17. NO - Is there any chance that you may be pregnant?        HPI:                                                      Brief HPI related to upcoming procedure:   48 yO female  presents for cardiopulmonary/general clearance to undergo the above procedure.  His surgeon listed above has asked for this clearance to undergo anesthesia. Pt has had this condition for approximately several years .   Overall pt is of good health and has not  had any perioperative complications with previous surgeries.          See problem list for active medical problems.  Problems all longstanding and stable, except as noted/documented.  See ROS for pertinent symptoms related to these conditions.                                                                                                  .    MEDICAL HISTORY:                                                    Patient Active Problem List    Diagnosis Date Noted     ACP (advance care planning) 07/20/2016     Priority: Medium     Advance Care Planning 7/20/2016: ACP Review of Chart / Resources Provided:  Reviewed chart for advance care plan.  Paulette RON Mongeon has been provided information and resources to begin or update their advance care plan.  Added by Katia Champagne           Essential hypertension with goal blood pressure less than 140/90 07/20/2016     Priority: Medium     Migraine 02/23/2001     Priority: Medium     Problem list name updated by automated process. 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     Infections of genitourinary tract antepartum 05/27/2009     Metrorrhagia 12/06/2001     Migraine, unspecified, without mention of intractable migraine without mention of status migrainosus 02/23/2001    oral  contraceptive related     Pain in limb 08/22/2002     Unspecified disorder of female genital organs 12/13/2006     Unspecified hemorrhage in early pregnancy, antepartum 12/04/2006     Past Surgical History:   Procedure Laterality Date     BREAST SURGERY       CHOLECYSTECTOMY       GYN SURGERY      laparoscopic supracervical hysterectomy,left tube and ovary removal,fulguration of endometriosis 8/28/13--[facility] [place]     LAPAROSCOPIC HYSTERECTOMY SUPRACERVICAL, BILATERAL SALPINGO-OOPHORECTOMY, COMBINED  8/28/2013    Procedure: COMBINED LAPAROSCOPIC HYSTERECTOMY SUPRACERVICAL, SALPINGO-OOPHORECTOMY;  LAPAROSCOPIC SUPRACERVICAL HYSTERECTOMY/LAPAROSCOPIC LEFT SALPINGO-OOPHORECTOMY;  Surgeon: Jerrell Thornton MD;  Location: [facility]     LAPAROSCOPY DIAGNOSTIC (GENERAL)       WISDOM TEETH       Current Outpatient Prescriptions   Medication Sig Dispense Refill     valsartan-hydrochlorothiazide (DIOVAN-HCT) 80-12.5 MG per tablet Take 1 tablet by mouth daily 90 tablet 0     fluticasone (FLONASE) 50 MCG/ACT nasal spray USE 2 SPRAYS IN EACH NOSTRIL EVERY DAY 16 g 2     Coconut Oil OIL        aspirin 81 MG chewable tablet Take 1 tablet (81 mg) by mouth daily 90 tablet 3     fish oil-omega-3 fatty acids (FISH OIL) 1000 MG capsule Take 2 capsules by mouth daily.       calcium carbonate-vitamin D (CALCIUM 600+D) 600-200 MG-UNIT TABS        OTC products: None, except as noted above    Allergies   Allergen Reactions     Ace Inhibitors Cough     Penicillins      Sulfa Drugs      SULFA (SULFONAMIDE ANTIBIOTICS)     Adhesive Tape Itching and Rash     Band aids.      Latex Allergy: NO    Social History   Substance Use Topics     Smoking status: Never Smoker     Smokeless tobacco: Never Used     Alcohol use No     History   Drug Use Not on file       REVIEW OF SYSTEMS:                                                    Constitutional, neuro, ENT, endocrine, pulmonary, cardiac, gastrointestinal, genitourinary, musculoskeletal, integument  "and psychiatric systems are negative, except as otherwise noted.      EXAM:                                                    /62  Pulse 83  Temp 99  F (37.2  C)  Resp 16  Ht 5' 9\" (1.753 m)  Wt 277 lb 3.2 oz (125.7 kg)  LMP 08/18/2013  SpO2 96%  BMI 40.94 kg/m2    GENERAL APPEARANCE: healthy, alert and no distress     EYES: EOMI,  PERRL     HENT: ear canals and TM's normal and nose and mouth without ulcers or lesions     NECK: no adenopathy, no asymmetry, masses, or scars and thyroid normal to palpation     RESP: lungs clear to auscultation - no rales, rhonchi or wheezes     CV: regular rates and rhythm, normal S1 S2, no S3 or S4 and no murmur, click or rub     ABDOMEN:  soft, nontender, no HSM or masses and bowel sounds normal     MS: extremities normal- no gross deformities noted, no evidence of inflammation in joints, FROM in all extremities.     SKIN: no suspicious lesions or rashes     NEURO: Normal strength and tone, sensory exam grossly normal, mentation intact and speech normal     PSYCH: mentation appears normal. and affect normal/bright     LYMPHATICS: No axillary, cervical, or supraclavicular nodes    DIAGNOSTICS:                                                    EKG: appears normal, NSR, normal axis, normal intervals, no acute ST/T changes c/w ischemia, no LVH by voltage criteria, unchanged from previous tracings    Recent Labs   Lab Test  11/14/17   1348  07/20/16   0930   08/29/13   0546   HGB  13.6   --    --   13.3   PLT  229   --    --   246   NA  139  139   < >  136   POTASSIUM  3.9  3.9   < >  4.6   CR  0.74  0.75   < >  0.90    < > = values in this interval not displayed.      Results for orders placed or performed in visit on 11/14/17 (from the past 24 hour(s))   CBC with platelets differential   Result Value Ref Range    WBC 8.5 4.0 - 11.0 10e9/L    RBC Count 4.57 3.8 - 5.2 10e12/L    Hemoglobin 13.6 11.7 - 15.7 g/dL    Hematocrit 40.7 35.0 - 47.0 %    MCV 89 78 - 100 fl    MCH " 29.8 26.5 - 33.0 pg    MCHC 33.4 31.5 - 36.5 g/dL    RDW 13.2 10.0 - 15.0 %    Platelet Count 229 150 - 450 10e9/L    Diff Method Automated Method     % Neutrophils 66.6 %    % Lymphocytes 21.0 %    % Monocytes 7.4 %    % Eosinophils 3.9 %    % Basophils 0.7 %    % Immature Granulocytes 0.4 %    Nucleated RBCs 0 0 /100    Absolute Neutrophil 5.7 1.6 - 8.3 10e9/L    Absolute Lymphocytes 1.8 0.8 - 5.3 10e9/L    Absolute Monocytes 0.6 0.0 - 1.3 10e9/L    Absolute Eosinophils 0.3 0.0 - 0.7 10e9/L    Absolute Basophils 0.1 0.0 - 0.2 10e9/L    Abs Immature Granulocytes 0.0 0 - 0.4 10e9/L    Absolute Nucleated RBC 0.0    Basic metabolic panel   Result Value Ref Range    Sodium 139 133 - 144 mmol/L    Potassium 3.9 3.4 - 5.3 mmol/L    Chloride 105 94 - 109 mmol/L    Carbon Dioxide 26 20 - 32 mmol/L    Anion Gap 8 3 - 14 mmol/L    Glucose 129 (H) 70 - 99 mg/dL    Urea Nitrogen 12 7 - 30 mg/dL    Creatinine 0.74 0.52 - 1.04 mg/dL    GFR Estimate 85 >60 mL/min/1.7m2    GFR Estimate If Black >90 >60 mL/min/1.7m2    Calcium 8.7 8.5 - 10.1 mg/dL       IMPRESSION:                                                    Reason for surgery/procedure: right pelvic pain probable endometrosis   Diagnosis/reason for consult: Cardiopulmonary clearance     The proposed surgical procedure is considered LOW risk.    REVISED CARDIAC RISK INDEX  The patient has the following serious cardiovascular risks for perioperative complications such as (MI, PE, VFib and 3  AV Block):  No serious cardiac risks  INTERPRETATION: 1 risks: Class II (low risk - 0.9% complication rate)    The patient has the following additional risks for perioperative complications:  No identified additional risks      ICD-10-CM    1. Pre-operative general physical examination Z01.818 CBC with platelets differential     Basic metabolic panel     EKG 12-lead complete w/read - Clinics     CBC with platelets differential     Basic metabolic panel     EKG 12-lead complete w/read -  Clinics   2. Pelvic pain in female R10.2 CBC with platelets differential     Basic metabolic panel     EKG 12-lead complete w/read - Clinics     CBC with platelets differential     Basic metabolic panel     EKG 12-lead complete w/read - Clinics   3. Essential hypertension with goal blood pressure less than 140/90 I10 CBC with platelets differential     Basic metabolic panel     EKG 12-lead complete w/read - Clinics     CBC with platelets differential     Basic metabolic panel     EKG 12-lead complete w/read - Clinics       RECOMMENDATIONS:                                                      APPROVAL GIVEN to proceed with proposed procedure, without further diagnostic evaluation       Signed Electronically by: Manohar Villareal MD    Copy of this evaluation report is provided to requesting physician.    [facility] Preop Guidelines

## 2017-11-15 ASSESSMENT — ANXIETY QUESTIONNAIRES: GAD7 TOTAL SCORE: 0

## 2017-11-20 NOTE — H&P (VIEW-ONLY)
[facility] [place]  [address]  [place] [address]  [phone]  Dept: [phone]    PRE-OP EVALUATION:  Today's date: 2017    Paulette Flores (: 1970) presents for pre-operative evaluation assessment as requested by Dr. Thornton.  She requires evaluation and anesthesia risk assessment prior to undergoing surgery/procedure for treatment of Laparoscopic salpingiogram .  Proposed procedure: Laparoscopic Salpinginiogram     Date of Surgery/ Procedure: 17  Time of Surgery/ Procedure: [facility]  [place] [facility] [place]    Primary Physician: Manohar Villareal  Type of Anesthesia Anticipated: General    Patient has a Health Care Directive or Living Will:  NO    1. NO - Do you have a history of heart attack, stroke, stent, bypass or surgery on an artery in the head, neck, heart or legs?  2. NO - Do you ever have any pain or discomfort in your chest?  3. NO - Do you have a history of  Heart Failure?  4. NO - Are you troubled by shortness of breath when: walking on the level, up a slight hill or at night?  5. NO - Do you currently have a cold, bronchitis or other respiratory infection?  6. NO - Do you have a cough, shortness of breath or wheezing?  7. NO - Do you sometimes get pains in the calves of your legs when you walk?  8. NO - Do you or anyone in your family have previous history of blood clots?  9. NO - Do you or does anyone in your family have a serious bleeding problem such as prolonged bleeding following surgeries or cuts?  10. NO - Have you ever had problems with anemia or been told to take iron pills?  11. NO - Have you had any abnormal blood loss such as black, tarry or bloody stools, or abnormal vaginal bleeding?  12. NO - Have you ever had a blood transfusion?  13. NO - Have you or any of your relatives ever had problems with anesthesia?  14. NO - Do you have sleep apnea, excessive snoring or daytime drowsiness?  15. NO - Do you have any prosthetic heart valves?  16. NO - Do you  have prosthetic joints?  17. NO - Is there any chance that you may be pregnant?        HPI:                                                      Brief HPI related to upcoming procedure:   46 yO female  presents for cardiopulmonary/general clearance to undergo the above procedure.  His surgeon listed above has asked for this clearance to undergo anesthesia. Pt has had this condition for approximately several years .   Overall pt is of good health and has not  had any perioperative complications with previous surgeries.          See problem list for active medical problems.  Problems all longstanding and stable, except as noted/documented.  See ROS for pertinent symptoms related to these conditions.                                                                                                  .    MEDICAL HISTORY:                                                    Patient Active Problem List    Diagnosis Date Noted     ACP (advance care planning) 07/20/2016     Priority: Medium     Advance Care Planning 7/20/2016: ACP Review of Chart / Resources Provided:  Reviewed chart for advance care plan.  Paulette RON Mongeon has been provided information and resources to begin or update their advance care plan.  Added by Katia Champagne           Essential hypertension with goal blood pressure less than 140/90 07/20/2016     Priority: Medium     Migraine 02/23/2001     Priority: Medium     Problem list name updated by automated process. 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     Infections of genitourinary tract antepartum 05/27/2009     Metrorrhagia 12/06/2001     Migraine, unspecified, without mention of intractable migraine without mention of status migrainosus 02/23/2001    oral  contraceptive related     Pain in limb 08/22/2002     Unspecified disorder of female genital organs 12/13/2006     Unspecified hemorrhage in early pregnancy, antepartum 12/04/2006     Past Surgical History:   Procedure Laterality Date     BREAST SURGERY       CHOLECYSTECTOMY       GYN SURGERY      laparoscopic supracervical hysterectomy,left tube and ovary removal,fulguration of endometriosis 8/28/13--[facility] [place]     LAPAROSCOPIC HYSTERECTOMY SUPRACERVICAL, BILATERAL SALPINGO-OOPHORECTOMY, COMBINED  8/28/2013    Procedure: COMBINED LAPAROSCOPIC HYSTERECTOMY SUPRACERVICAL, SALPINGO-OOPHORECTOMY;  LAPAROSCOPIC SUPRACERVICAL HYSTERECTOMY/LAPAROSCOPIC LEFT SALPINGO-OOPHORECTOMY;  Surgeon: Jerrell Thornton MD;  Location: [facility]     LAPAROSCOPY DIAGNOSTIC (GENERAL)       WISDOM TEETH       Current Outpatient Prescriptions   Medication Sig Dispense Refill     valsartan-hydrochlorothiazide (DIOVAN-HCT) 80-12.5 MG per tablet Take 1 tablet by mouth daily 90 tablet 0     fluticasone (FLONASE) 50 MCG/ACT nasal spray USE 2 SPRAYS IN EACH NOSTRIL EVERY DAY 16 g 2     Coconut Oil OIL        aspirin 81 MG chewable tablet Take 1 tablet (81 mg) by mouth daily 90 tablet 3     fish oil-omega-3 fatty acids (FISH OIL) 1000 MG capsule Take 2 capsules by mouth daily.       calcium carbonate-vitamin D (CALCIUM 600+D) 600-200 MG-UNIT TABS        OTC products: None, except as noted above    Allergies   Allergen Reactions     Ace Inhibitors Cough     Penicillins      Sulfa Drugs      SULFA (SULFONAMIDE ANTIBIOTICS)     Adhesive Tape Itching and Rash     Band aids.      Latex Allergy: NO    Social History   Substance Use Topics     Smoking status: Never Smoker     Smokeless tobacco: Never Used     Alcohol use No     History   Drug Use Not on file       REVIEW OF SYSTEMS:                                                    Constitutional, neuro, ENT, endocrine, pulmonary, cardiac, gastrointestinal, genitourinary, musculoskeletal, integument  "and psychiatric systems are negative, except as otherwise noted.      EXAM:                                                    /62  Pulse 83  Temp 99  F (37.2  C)  Resp 16  Ht 5' 9\" (1.753 m)  Wt 277 lb 3.2 oz (125.7 kg)  LMP 08/18/2013  SpO2 96%  BMI 40.94 kg/m2    GENERAL APPEARANCE: healthy, alert and no distress     EYES: EOMI,  PERRL     HENT: ear canals and TM's normal and nose and mouth without ulcers or lesions     NECK: no adenopathy, no asymmetry, masses, or scars and thyroid normal to palpation     RESP: lungs clear to auscultation - no rales, rhonchi or wheezes     CV: regular rates and rhythm, normal S1 S2, no S3 or S4 and no murmur, click or rub     ABDOMEN:  soft, nontender, no HSM or masses and bowel sounds normal     MS: extremities normal- no gross deformities noted, no evidence of inflammation in joints, FROM in all extremities.     SKIN: no suspicious lesions or rashes     NEURO: Normal strength and tone, sensory exam grossly normal, mentation intact and speech normal     PSYCH: mentation appears normal. and affect normal/bright     LYMPHATICS: No axillary, cervical, or supraclavicular nodes    DIAGNOSTICS:                                                    EKG: appears normal, NSR, normal axis, normal intervals, no acute ST/T changes c/w ischemia, no LVH by voltage criteria, unchanged from previous tracings    Recent Labs   Lab Test  11/14/17   1348  07/20/16   0930   08/29/13   0546   HGB  13.6   --    --   13.3   PLT  229   --    --   246   NA  139  139   < >  136   POTASSIUM  3.9  3.9   < >  4.6   CR  0.74  0.75   < >  0.90    < > = values in this interval not displayed.      Results for orders placed or performed in visit on 11/14/17 (from the past 24 hour(s))   CBC with platelets differential   Result Value Ref Range    WBC 8.5 4.0 - 11.0 10e9/L    RBC Count 4.57 3.8 - 5.2 10e12/L    Hemoglobin 13.6 11.7 - 15.7 g/dL    Hematocrit 40.7 35.0 - 47.0 %    MCV 89 78 - 100 fl    MCH " 29.8 26.5 - 33.0 pg    MCHC 33.4 31.5 - 36.5 g/dL    RDW 13.2 10.0 - 15.0 %    Platelet Count 229 150 - 450 10e9/L    Diff Method Automated Method     % Neutrophils 66.6 %    % Lymphocytes 21.0 %    % Monocytes 7.4 %    % Eosinophils 3.9 %    % Basophils 0.7 %    % Immature Granulocytes 0.4 %    Nucleated RBCs 0 0 /100    Absolute Neutrophil 5.7 1.6 - 8.3 10e9/L    Absolute Lymphocytes 1.8 0.8 - 5.3 10e9/L    Absolute Monocytes 0.6 0.0 - 1.3 10e9/L    Absolute Eosinophils 0.3 0.0 - 0.7 10e9/L    Absolute Basophils 0.1 0.0 - 0.2 10e9/L    Abs Immature Granulocytes 0.0 0 - 0.4 10e9/L    Absolute Nucleated RBC 0.0    Basic metabolic panel   Result Value Ref Range    Sodium 139 133 - 144 mmol/L    Potassium 3.9 3.4 - 5.3 mmol/L    Chloride 105 94 - 109 mmol/L    Carbon Dioxide 26 20 - 32 mmol/L    Anion Gap 8 3 - 14 mmol/L    Glucose 129 (H) 70 - 99 mg/dL    Urea Nitrogen 12 7 - 30 mg/dL    Creatinine 0.74 0.52 - 1.04 mg/dL    GFR Estimate 85 >60 mL/min/1.7m2    GFR Estimate If Black >90 >60 mL/min/1.7m2    Calcium 8.7 8.5 - 10.1 mg/dL       IMPRESSION:                                                    Reason for surgery/procedure: right pelvic pain probable endometrosis   Diagnosis/reason for consult: Cardiopulmonary clearance     The proposed surgical procedure is considered LOW risk.    REVISED CARDIAC RISK INDEX  The patient has the following serious cardiovascular risks for perioperative complications such as (MI, PE, VFib and 3  AV Block):  No serious cardiac risks  INTERPRETATION: 1 risks: Class II (low risk - 0.9% complication rate)    The patient has the following additional risks for perioperative complications:  No identified additional risks      ICD-10-CM    1. Pre-operative general physical examination Z01.818 CBC with platelets differential     Basic metabolic panel     EKG 12-lead complete w/read - Clinics     CBC with platelets differential     Basic metabolic panel     EKG 12-lead complete w/read -  Clinics   2. Pelvic pain in female R10.2 CBC with platelets differential     Basic metabolic panel     EKG 12-lead complete w/read - Clinics     CBC with platelets differential     Basic metabolic panel     EKG 12-lead complete w/read - Clinics   3. Essential hypertension with goal blood pressure less than 140/90 I10 CBC with platelets differential     Basic metabolic panel     EKG 12-lead complete w/read - Clinics     CBC with platelets differential     Basic metabolic panel     EKG 12-lead complete w/read - Clinics       RECOMMENDATIONS:                                                      APPROVAL GIVEN to proceed with proposed procedure, without further diagnostic evaluation       Signed Electronically by: Manohar Villareal MD    Copy of this evaluation report is provided to requesting physician.    [facility] Preop Guidelines

## 2017-11-22 ENCOUNTER — APPOINTMENT (OUTPATIENT)
Dept: LAB | Facility: HOSPITAL | Age: 47
End: 2017-11-22
Attending: FAMILY MEDICINE
Payer: COMMERCIAL

## 2017-11-22 ENCOUNTER — SURGERY (OUTPATIENT)
Age: 47
End: 2017-11-22

## 2017-11-22 ENCOUNTER — HOSPITAL ENCOUNTER (OUTPATIENT)
Facility: HOSPITAL | Age: 47
Discharge: HOME | End: 2017-11-22
Attending: OBSTETRICS & GYNECOLOGY | Admitting: OBSTETRICS & GYNECOLOGY
Payer: COMMERCIAL

## 2017-11-22 ENCOUNTER — ANESTHESIA (OUTPATIENT)
Dept: SURGERY | Facility: HOSPITAL | Age: 47
End: 2017-11-22
Payer: COMMERCIAL

## 2017-11-22 ENCOUNTER — ANESTHESIA EVENT (OUTPATIENT)
Dept: SURGERY | Facility: HOSPITAL | Age: 47
End: 2017-11-22
Payer: COMMERCIAL

## 2017-11-22 VITALS
OXYGEN SATURATION: 97 % | TEMPERATURE: 97.7 F | RESPIRATION RATE: 16 BRPM | SYSTOLIC BLOOD PRESSURE: 120 MMHG | HEART RATE: 69 BPM | DIASTOLIC BLOOD PRESSURE: 76 MMHG

## 2017-11-22 DIAGNOSIS — G89.18 POST-OP PAIN: Primary | ICD-10-CM

## 2017-11-22 LAB
ABO + RH BLD: NORMAL
ABO + RH BLD: NORMAL
BLD GP AB SCN SERPL QL: NORMAL
BLOOD BANK CMNT PATIENT-IMP: NORMAL
HCG SERPL QL: NEGATIVE
SPECIMEN EXP DATE BLD: NORMAL

## 2017-11-22 PROCEDURE — 58661 LAPAROSCOPY REMOVE ADNEXA: CPT | Mod: AS | Performed by: NURSE PRACTITIONER

## 2017-11-22 PROCEDURE — 58720 REMOVAL OF OVARY/TUBE(S): CPT | Performed by: ANESTHESIOLOGY

## 2017-11-22 PROCEDURE — 36000058 ZZH SURGERY LEVEL 3 EA 15 ADDTL MIN: Performed by: OBSTETRICS & GYNECOLOGY

## 2017-11-22 PROCEDURE — 86900 BLOOD TYPING SEROLOGIC ABO: CPT | Performed by: OBSTETRICS & GYNECOLOGY

## 2017-11-22 PROCEDURE — 27210794 ZZH OR GENERAL SUPPLY STERILE: Performed by: OBSTETRICS & GYNECOLOGY

## 2017-11-22 PROCEDURE — 58661 LAPAROSCOPY REMOVE ADNEXA: CPT | Performed by: OBSTETRICS & GYNECOLOGY

## 2017-11-22 PROCEDURE — 37000009 ZZH ANESTHESIA TECHNICAL FEE, EACH ADDTL 15 MIN: Performed by: OBSTETRICS & GYNECOLOGY

## 2017-11-22 PROCEDURE — 86901 BLOOD TYPING SEROLOGIC RH(D): CPT | Performed by: OBSTETRICS & GYNECOLOGY

## 2017-11-22 PROCEDURE — 88305 TISSUE EXAM BY PATHOLOGIST: CPT | Mod: TC | Performed by: OBSTETRICS & GYNECOLOGY

## 2017-11-22 PROCEDURE — 71000014 ZZH RECOVERY PHASE 1 LEVEL 2 FIRST HR: Performed by: OBSTETRICS & GYNECOLOGY

## 2017-11-22 PROCEDURE — 36000056 ZZH SURGERY LEVEL 3 1ST 30 MIN: Performed by: OBSTETRICS & GYNECOLOGY

## 2017-11-22 PROCEDURE — 25000128 H RX IP 250 OP 636: Performed by: NURSE ANESTHETIST, CERTIFIED REGISTERED

## 2017-11-22 PROCEDURE — 86850 RBC ANTIBODY SCREEN: CPT | Performed by: OBSTETRICS & GYNECOLOGY

## 2017-11-22 PROCEDURE — 25000125 ZZHC RX 250: Performed by: OBSTETRICS & GYNECOLOGY

## 2017-11-22 PROCEDURE — S0077 INJECTION, CLINDAMYCIN PHOSP: HCPCS | Performed by: OBSTETRICS & GYNECOLOGY

## 2017-11-22 PROCEDURE — 01999 UNLISTED ANES PROCEDURE: CPT | Performed by: NURSE ANESTHETIST, CERTIFIED REGISTERED

## 2017-11-22 PROCEDURE — 27110028 ZZH OR GENERAL SUPPLY NON-STERILE: Performed by: OBSTETRICS & GYNECOLOGY

## 2017-11-22 PROCEDURE — 25000128 H RX IP 250 OP 636: Performed by: OBSTETRICS & GYNECOLOGY

## 2017-11-22 PROCEDURE — 25000125 ZZHC RX 250: Performed by: NURSE ANESTHETIST, CERTIFIED REGISTERED

## 2017-11-22 PROCEDURE — 40000305 ZZH STATISTIC PRE PROC ASSESS I: Performed by: OBSTETRICS & GYNECOLOGY

## 2017-11-22 PROCEDURE — 25000128 H RX IP 250 OP 636: Performed by: ANESTHESIOLOGY

## 2017-11-22 PROCEDURE — 25000566 ZZH SEVOFLURANE, EA 15 MIN: Performed by: ANESTHESIOLOGY

## 2017-11-22 PROCEDURE — 88304 TISSUE EXAM BY PATHOLOGIST: CPT | Mod: TC | Performed by: OBSTETRICS & GYNECOLOGY

## 2017-11-22 PROCEDURE — 37000008 ZZH ANESTHESIA TECHNICAL FEE, 1ST 30 MIN: Performed by: OBSTETRICS & GYNECOLOGY

## 2017-11-22 PROCEDURE — 84703 CHORIONIC GONADOTROPIN ASSAY: CPT | Performed by: OBSTETRICS & GYNECOLOGY

## 2017-11-22 PROCEDURE — 71000027 ZZH RECOVERY PHASE 2 EACH 15 MINS: Performed by: OBSTETRICS & GYNECOLOGY

## 2017-11-22 PROCEDURE — 36415 COLL VENOUS BLD VENIPUNCTURE: CPT | Performed by: OBSTETRICS & GYNECOLOGY

## 2017-11-22 PROCEDURE — 25000125 ZZHC RX 250: Performed by: ANESTHESIOLOGY

## 2017-11-22 RX ORDER — SCOLOPAMINE TRANSDERMAL SYSTEM 1 MG/1
1 PATCH, EXTENDED RELEASE TRANSDERMAL ONCE
Status: COMPLETED | OUTPATIENT
Start: 2017-11-22 | End: 2017-11-22

## 2017-11-22 RX ORDER — EPHEDRINE SULFATE 50 MG/ML
INJECTION, SOLUTION INTRAMUSCULAR; INTRAVENOUS; SUBCUTANEOUS PRN
Status: DISCONTINUED | OUTPATIENT
Start: 2017-11-22 | End: 2017-11-22

## 2017-11-22 RX ORDER — ONDANSETRON 4 MG/1
4 TABLET, ORALLY DISINTEGRATING ORAL
Status: DISCONTINUED | OUTPATIENT
Start: 2017-11-22 | End: 2017-11-22 | Stop reason: HOSPADM

## 2017-11-22 RX ORDER — IBUPROFEN 800 MG/1
800 TABLET, FILM COATED ORAL EVERY 8 HOURS PRN
Qty: 40 TABLET | Refills: 1 | Status: SHIPPED | OUTPATIENT
Start: 2017-11-22 | End: 2018-01-03

## 2017-11-22 RX ORDER — KETOROLAC TROMETHAMINE 30 MG/ML
30 INJECTION, SOLUTION INTRAMUSCULAR; INTRAVENOUS ONCE
Status: COMPLETED | OUTPATIENT
Start: 2017-11-22 | End: 2017-11-22

## 2017-11-22 RX ORDER — DEXAMETHASONE SODIUM PHOSPHATE 4 MG/ML
4 INJECTION, SOLUTION INTRA-ARTICULAR; INTRALESIONAL; INTRAMUSCULAR; INTRAVENOUS; SOFT TISSUE EVERY 10 MIN PRN
Status: DISCONTINUED | OUTPATIENT
Start: 2017-11-22 | End: 2017-11-22 | Stop reason: HOSPADM

## 2017-11-22 RX ORDER — ONDANSETRON 4 MG/1
4 TABLET, ORALLY DISINTEGRATING ORAL EVERY 30 MIN PRN
Status: DISCONTINUED | OUTPATIENT
Start: 2017-11-22 | End: 2017-11-22 | Stop reason: HOSPADM

## 2017-11-22 RX ORDER — DEXAMETHASONE SODIUM PHOSPHATE 10 MG/ML
INJECTION, SOLUTION INTRAMUSCULAR; INTRAVENOUS PRN
Status: DISCONTINUED | OUTPATIENT
Start: 2017-11-22 | End: 2017-11-22

## 2017-11-22 RX ORDER — ONDANSETRON 2 MG/ML
INJECTION INTRAMUSCULAR; INTRAVENOUS PRN
Status: DISCONTINUED | OUTPATIENT
Start: 2017-11-22 | End: 2017-11-22

## 2017-11-22 RX ORDER — OXYCODONE AND ACETAMINOPHEN 5; 325 MG/1; MG/1
2 TABLET ORAL EVERY 6 HOURS PRN
Qty: 30 TABLET | Refills: 0 | Status: SHIPPED | OUTPATIENT
Start: 2017-11-22 | End: 2018-05-19

## 2017-11-22 RX ORDER — ONDANSETRON 2 MG/ML
4 INJECTION INTRAMUSCULAR; INTRAVENOUS EVERY 30 MIN PRN
Status: DISCONTINUED | OUTPATIENT
Start: 2017-11-22 | End: 2017-11-22 | Stop reason: HOSPADM

## 2017-11-22 RX ORDER — PROPOFOL 10 MG/ML
INJECTION, EMULSION INTRAVENOUS PRN
Status: DISCONTINUED | OUTPATIENT
Start: 2017-11-22 | End: 2017-11-22

## 2017-11-22 RX ORDER — FENTANYL CITRATE 50 UG/ML
INJECTION, SOLUTION INTRAMUSCULAR; INTRAVENOUS PRN
Status: DISCONTINUED | OUTPATIENT
Start: 2017-11-22 | End: 2017-11-22

## 2017-11-22 RX ORDER — HYDROMORPHONE HYDROCHLORIDE 1 MG/ML
.3-.5 INJECTION, SOLUTION INTRAMUSCULAR; INTRAVENOUS; SUBCUTANEOUS EVERY 10 MIN PRN
Status: DISCONTINUED | OUTPATIENT
Start: 2017-11-22 | End: 2017-11-22 | Stop reason: HOSPADM

## 2017-11-22 RX ORDER — LABETALOL HYDROCHLORIDE 5 MG/ML
10 INJECTION, SOLUTION INTRAVENOUS
Status: DISCONTINUED | OUTPATIENT
Start: 2017-11-22 | End: 2017-11-22 | Stop reason: HOSPADM

## 2017-11-22 RX ORDER — CLINDAMYCIN PHOSPHATE 900 MG/50ML
900 INJECTION, SOLUTION INTRAVENOUS
Status: COMPLETED | OUTPATIENT
Start: 2017-11-22 | End: 2017-11-22

## 2017-11-22 RX ORDER — SODIUM CHLORIDE, SODIUM LACTATE, POTASSIUM CHLORIDE, CALCIUM CHLORIDE 600; 310; 30; 20 MG/100ML; MG/100ML; MG/100ML; MG/100ML
INJECTION, SOLUTION INTRAVENOUS CONTINUOUS
Status: DISCONTINUED | OUTPATIENT
Start: 2017-11-22 | End: 2017-11-22 | Stop reason: HOSPADM

## 2017-11-22 RX ORDER — OXYCODONE AND ACETAMINOPHEN 5; 325 MG/1; MG/1
1 TABLET ORAL
Status: DISCONTINUED | OUTPATIENT
Start: 2017-11-22 | End: 2017-11-22 | Stop reason: HOSPADM

## 2017-11-22 RX ORDER — ALBUTEROL SULFATE 0.83 MG/ML
2.5 SOLUTION RESPIRATORY (INHALATION) EVERY 4 HOURS PRN
Status: DISCONTINUED | OUTPATIENT
Start: 2017-11-22 | End: 2017-11-22 | Stop reason: HOSPADM

## 2017-11-22 RX ORDER — GLYCOPYRROLATE 0.2 MG/ML
INJECTION, SOLUTION INTRAMUSCULAR; INTRAVENOUS PRN
Status: DISCONTINUED | OUTPATIENT
Start: 2017-11-22 | End: 2017-11-22

## 2017-11-22 RX ORDER — NALOXONE HYDROCHLORIDE 0.4 MG/ML
.1-.4 INJECTION, SOLUTION INTRAMUSCULAR; INTRAVENOUS; SUBCUTANEOUS
Status: DISCONTINUED | OUTPATIENT
Start: 2017-11-22 | End: 2017-11-22 | Stop reason: HOSPADM

## 2017-11-22 RX ORDER — PROMETHAZINE HYDROCHLORIDE 25 MG/ML
12.5 INJECTION, SOLUTION INTRAMUSCULAR; INTRAVENOUS
Status: DISCONTINUED | OUTPATIENT
Start: 2017-11-22 | End: 2017-11-22 | Stop reason: HOSPADM

## 2017-11-22 RX ORDER — NEOSTIGMINE METHYLSULFATE 1 MG/ML
VIAL (ML) INJECTION PRN
Status: DISCONTINUED | OUTPATIENT
Start: 2017-11-22 | End: 2017-11-22

## 2017-11-22 RX ORDER — KETOROLAC TROMETHAMINE 30 MG/ML
30 INJECTION, SOLUTION INTRAMUSCULAR; INTRAVENOUS EVERY 6 HOURS PRN
Status: DISCONTINUED | OUTPATIENT
Start: 2017-11-22 | End: 2017-11-22 | Stop reason: HOSPADM

## 2017-11-22 RX ORDER — FENTANYL CITRATE 50 UG/ML
25-50 INJECTION, SOLUTION INTRAMUSCULAR; INTRAVENOUS
Status: DISCONTINUED | OUTPATIENT
Start: 2017-11-22 | End: 2017-11-22 | Stop reason: HOSPADM

## 2017-11-22 RX ORDER — HYDRALAZINE HYDROCHLORIDE 20 MG/ML
2.5-5 INJECTION INTRAMUSCULAR; INTRAVENOUS EVERY 10 MIN PRN
Status: DISCONTINUED | OUTPATIENT
Start: 2017-11-22 | End: 2017-11-22 | Stop reason: HOSPADM

## 2017-11-22 RX ORDER — MEPERIDINE HYDROCHLORIDE 25 MG/ML
12.5 INJECTION INTRAMUSCULAR; INTRAVENOUS; SUBCUTANEOUS
Status: DISCONTINUED | OUTPATIENT
Start: 2017-11-22 | End: 2017-11-22 | Stop reason: HOSPADM

## 2017-11-22 RX ORDER — LIDOCAINE HYDROCHLORIDE 20 MG/ML
INJECTION, SOLUTION INFILTRATION; PERINEURAL PRN
Status: DISCONTINUED | OUTPATIENT
Start: 2017-11-22 | End: 2017-11-22

## 2017-11-22 RX ADMIN — FENTANYL CITRATE 50 MCG: 50 INJECTION, SOLUTION INTRAMUSCULAR; INTRAVENOUS at 11:17

## 2017-11-22 RX ADMIN — Medication 4 MG: at 12:17

## 2017-11-22 RX ADMIN — FENTANYL CITRATE 50 MCG: 50 INJECTION, SOLUTION INTRAMUSCULAR; INTRAVENOUS at 12:25

## 2017-11-22 RX ADMIN — Medication 10 MG: at 11:35

## 2017-11-22 RX ADMIN — ONDANSETRON 4 MG: 2 INJECTION INTRAMUSCULAR; INTRAVENOUS at 12:09

## 2017-11-22 RX ADMIN — LIDOCAINE HYDROCHLORIDE 40 MG: 20 INJECTION, SOLUTION INFILTRATION; PERINEURAL at 11:18

## 2017-11-22 RX ADMIN — PROPOFOL 200 MG: 10 INJECTION, EMULSION INTRAVENOUS at 11:19

## 2017-11-22 RX ADMIN — FENTANYL CITRATE 50 MCG: 50 INJECTION, SOLUTION INTRAMUSCULAR; INTRAVENOUS at 11:47

## 2017-11-22 RX ADMIN — KETOROLAC TROMETHAMINE 30 MG: 30 INJECTION, SOLUTION INTRAMUSCULAR at 10:33

## 2017-11-22 RX ADMIN — FENTANYL CITRATE: 50 INJECTION INTRAMUSCULAR; INTRAVENOUS at 13:00

## 2017-11-22 RX ADMIN — TRANEXAMIC ACID 1 G: 100 INJECTION, SOLUTION INTRAVENOUS at 12:02

## 2017-11-22 RX ADMIN — DEXAMETHASONE SODIUM PHOSPHATE 8 MG: 10 INJECTION, SOLUTION INTRAMUSCULAR; INTRAVENOUS at 11:27

## 2017-11-22 RX ADMIN — SODIUM CHLORIDE, POTASSIUM CHLORIDE, SODIUM LACTATE AND CALCIUM CHLORIDE 100 ML: 600; 310; 30; 20 INJECTION, SOLUTION INTRAVENOUS at 10:33

## 2017-11-22 RX ADMIN — FENTANYL CITRATE 50 MCG: 50 INJECTION INTRAMUSCULAR; INTRAVENOUS at 12:45

## 2017-11-22 RX ADMIN — CLINDAMYCIN PHOSPHATE 900 MG: 18 INJECTION, SOLUTION INTRAVENOUS at 11:23

## 2017-11-22 RX ADMIN — Medication 100 MG: at 11:20

## 2017-11-22 RX ADMIN — FENTANYL CITRATE 50 MCG: 50 INJECTION, SOLUTION INTRAMUSCULAR; INTRAVENOUS at 11:10

## 2017-11-22 RX ADMIN — MIDAZOLAM HYDROCHLORIDE 2 MG: 1 INJECTION, SOLUTION INTRAMUSCULAR; INTRAVENOUS at 11:10

## 2017-11-22 RX ADMIN — GLYCOPYRROLATE 0.6 MG: 0.2 INJECTION, SOLUTION INTRAMUSCULAR; INTRAVENOUS at 12:17

## 2017-11-22 RX ADMIN — SCOPALAMINE 1 PATCH: 1 PATCH, EXTENDED RELEASE TRANSDERMAL at 10:33

## 2017-11-22 RX ADMIN — ROCURONIUM BROMIDE 30 MG: 10 INJECTION INTRAVENOUS at 11:29

## 2017-11-22 ASSESSMENT — LIFESTYLE VARIABLES: TOBACCO_USE: 1

## 2017-11-22 NOTE — IP AVS SNAPSHOT
MRN:[id number]                      After Visit Summary   11/22/2017    Paulette Flores    MRN: [id number]           Thank you!     Thank you for choosing [facility] for your care. Our goal is always to provide you with excellent care. Hearing back from our patients is one way we can continue to improve our services. Please take a few minutes to complete the written survey that you may receive in the mail after you visit with us. Thank you!        Patient Information     Date Of Birth          1970        About your hospital stay     You were admitted on:  November 22, 2017 You last received care in the:  [facility]    You were discharged on:  November 22, 2017       Who to Call     For medical emergencies, please call 911.  For non-urgent questions about your medical care, please call your primary care provider or clinic, [phone]  For questions related to your surgery, please call your surgery clinic        Attending Provider     Provider Specialty    Jerrell Thornton MD OB/Gyn       Primary Care Provider Office Phone # Fax #    Manohar Villareal [phone]      Further instructions from your care team           Post-Anesthesia Patient Instructions    IMMEDIATELY FOLLOWING SURGERY:  Do not drive or operate machinery for the first twenty four hours after surgery.  Do not make any important decisions for twenty four hours after surgery or while taking narcotic pain medications or sedatives.  If you develop intractable nausea and vomiting or a severe headache please notify your doctor immediately.    FOLLOW-UP:  Please make an appointment with your surgeon as instructed. You do not need to follow up with anesthesia unless specifically instructed to do so.    WOUND CARE INSTRUCTIONS (if applicable):  Keep a dry clean dressing on the anesthesia/puncture wound site if there is drainage.  Once the wound has quit draining you may leave it open to air.  Generally you should leave  the bandage intact for twenty four hours unless there is drainage.  If the epidural site drains for more than 36-48 hours please call the anesthesia department.    QUESTIONS?:  Please feel free to call your physician or the hospital  if you have any questions, and they will be happy to assist you.   Call MD prior to DC.  No driving today or while on pain meds  Pelvic rest for 2 weeks  Call Dr. Thornton [phone] as necessary if problems in interim, no post op appt needed.  No heavy lifting, vigorous activity, swim, bath, exercise for 2 weeks    Pending Results     No orders found from 11/20/2017 to 11/23/2017.            Admission Information     Date & Time Provider Department Dept. Phone    11/22/2017 Jerrell Thornton MD [facility] [phone]      Your Vitals Were     Blood Pressure Pulse Temperature Respirations Last Period Pulse Oximetry    116/71 69 97.7  F (36.5  C) (Oral) 16 08/18/2013 97%      MyChart Information     [employer] gives you secure access to your electronic health record. If you see a primary care provider, you can also send messages to your care team and make appointments. If you have questions, please call your primary care clinic.  If you do not have a primary care provider, please call [phone] and they will assist you.        Care EveryWhere ID     This is your Care EveryWhere ID. This could be used by other organizations to access your [facility] medical records  [id number]        Equal Access to Services     SUMAN TOMPKINS : Hadii basim Velarde, waaxda stephane, qaybta debraalbelgica jiang . So [facility] [phone].    ATENCIÓN: Si habla español, tiene a booker disposición servicios gratuitos de asistencia lingüística. Jessica al [phone].    We comply with applicable federal civil rights laws and [place] laws. We do not discriminate on the basis of race, color, national origin, age, disability, sex, sexual orientation, or  gender identity.               Review of your medicines      START taking        Dose / Directions    ibuprofen 800 MG tablet   Commonly known as:  ADVIL/MOTRIN   Used for:  Post-op pain        Dose:  800 mg   Take 1 tablet (800 mg) by mouth every 8 hours as needed for moderate pain   Quantity:  40 tablet   Refills:  1       oxyCODONE-acetaminophen 5-325 MG per tablet   Commonly known as:  PERCOCET   Used for:  Post-op pain        Dose:  2 tablet   Take 2 tablets by mouth every 6 hours as needed for moderate to severe pain   Quantity:  30 tablet   Refills:  0         CONTINUE these medicines which have NOT CHANGED        Dose / Directions    aspirin 81 MG chewable tablet   Used for:  Atypical chest pain        Dose:  81 mg   Take 1 tablet (81 mg) by mouth daily   Quantity:  90 tablet   Refills:  3       Coconut Oil Oil        Refills:  0       valsartan-hydrochlorothiazide 80-12.5 MG per tablet   Commonly known as:  DIOVAN-HCT   Used for:  Essential hypertension with goal blood pressure less than 140/90        Dose:  1 tablet   Take 1 tablet by mouth daily   Quantity:  90 tablet   Refills:  3            Where to get your medicines      These medications were sent to [facility] - [address]  [address] [address]     Phone:  [phone]     ibuprofen 800 MG tablet         Some of these will need a paper prescription and others can be bought over the counter. Ask your nurse if you have questions.     Bring a paper prescription for each of these medications     oxyCODONE-acetaminophen 5-325 MG per tablet                Protect others around you: Learn how to safely use, store and throw away your medicines at www.disposemymeds.org.             Medication List: This is a list of all your medications and when to take them. Check marks below indicate your daily home schedule. Keep this list as a reference.      Medications           Morning Afternoon Evening Bedtime As Needed     aspirin 81 MG chewable tablet   Take 1 tablet (81 mg) by mouth daily                                Coconut Oil Oil                                ibuprofen 800 MG tablet   Commonly known as:  ADVIL/MOTRIN   Take 1 tablet (800 mg) by mouth every 8 hours as needed for moderate pain                                oxyCODONE-acetaminophen 5-325 MG per tablet   Commonly known as:  PERCOCET   Take 2 tablets by mouth every 6 hours as needed for moderate to severe pain                                valsartan-hydrochlorothiazide 80-12.5 MG per tablet   Commonly known as:  DIOVAN-HCT   Take 1 tablet by mouth daily

## 2017-11-22 NOTE — OP NOTE
[facility]  Operative Note    Pre-operative diagnosis: PELVIC PAIN IN FEMALE, ENDOMETRIOSIS   Post-operative diagnosis Same, Right pelvic sidewall mass. Pelvic adhesions. Pathology Pending   Procedure: Procedure(s):  LAPAROSCOPIC RIGHT SALPINGO-OOPHORECTOMY - Wound Class: II-Clean Contaminated.  Excision of right pelvic sidewall mass.  Lysis of adhesions.     Surgeon:  Assistant: MD Kathryn Pierce NP   Anesthesia: General    Estimated blood loss: Minimal   Blood transfusion: No transfusion was given during surgery   Drains: None   Specimens: Right tube/ovary.  Right pelvic sidewall mass.     Findings: Surgically absent uterus and left adnexa.  Right ovary and tube appeared normal.  There was a 3 cm firm mass superior to the ovary densely adherent to the right pelvic sidewall peritoneum.  The sigmoid colon was partially adherent to this mass.  The cecum also had single adhesion extending to right anterior abdominal wall.   Otherwise normal anatomy.     Complications: None   Condition: Stable       OPERATIVE NARRATION: The patient was brought to the Operating Room and uneventfully placed under general anesthesia. She was prepped and draped in the dorsal lithotomy position and her bladder drained. A sponge stick was placed in the vagina.  We then changed gloves and proceeded to the abdominal portion of the case. A 5 mm infraumbilical skin incision was performed with a scalpel. A Veress needle was introduced without difficulty and a water drop test performed. The abdomen was insufflated with several liters of carbon dioxide. A 5 mm trocar and a laparoscope were introduced. Visualization was excellent. Findings were as described above. Next, an 11 mm suprapubic and a 5 mm right  lower quadrant ports were placed under direct visualization. After initial exploration the uterus was then elevated. The cecum and sigmoid adhesions were lysed sharply with a laparoscopic scissors.  Using a  combination of sharp and blunt dissection and the Ligasure the right pelvic sidewall mass was carefully dissected off the right pelvic sidewall peritoneum with care to avoid underlying pelvic sidewall structures.  The LigaSure bipolar cutting cautery device was used to transect the Right IP ligament away from pelvic sidewall structures.  The dissection was continued through remnants of the round and uteroovarian ligaments.   The adnexa and was transected and removed with the pelvic sidewall mass through suprapubic port  in an endobag.  The pelvis was irrigated and checked for hemostasis .A Nicholas-Aragon needle was used to close the suprapubic fascia with interrupted 0 Vicryl sutures. At this point there was excellent hemostasis and we proceeded to closure. The remaining trocars were removed and excess carbon dioxide expressed from the abdomen. Subcutaneous spaces were irrigated and checked for hemostasis. The suprapubic skin incision was closed with running subcuticular 4.0 Monocryl. The skin incisions were closed with surgical glue. There were no complications. The sponge stick was removed.  The patient was transferred to the Recovery Room in excellent and stable condition.     CHECO PERALES MD

## 2017-11-22 NOTE — OR NURSING
Patient and responsible adult given discharge instructions with no questions regarding instructions. Asha score 20 Pain level 0/10.  Discharged from unit via ambulatory.Patient discharged to home.

## 2017-11-22 NOTE — ANESTHESIA PREPROCEDURE EVALUATION
Anesthesia Evaluation     . Pt has had prior anesthetic.     No history of anesthetic complications          ROS/MED HX    ENT/Pulmonary:     (+)GIN risk factors (BMI: 41.02) hypertension, obese, tobacco use, Current use Social packs/day  , . .    Neurologic:     (+)migraines,     Cardiovascular:     (+) Dyslipidemia, hypertension-range: not on beta blocker, ---. : . . . :. . Previous cardiac testing date:results:date: results:ECG reviewed date:11/14/2017 results:NSR@73, OWN date: results:          METS/Exercise Tolerance:     Hematologic:  - neg hematologic  ROS       Musculoskeletal:  - neg musculoskeletal ROS       GI/Hepatic:  - neg GI/hepatic ROS       Renal/Genitourinary:     (+) Other Renal/ Genitourinary, PELVIC PAIN IN FEMALE, ENDOMETRIOSIS, s/p Hysterectomy 2013      Endo:     (+) Obesity, .      Psychiatric:  - neg psychiatric ROS       Infectious Disease:  - neg infectious disease ROS       Malignancy:      - no malignancy   Other: Comment: s/p Hysterectomy 2013   (+) No chance of pregnancy                    Physical Exam  Normal systems: cardiovascular, pulmonary and dental    Airway   Mallampati: III  TM distance: >3 FB  Neck ROM: full    Dental     Cardiovascular   Rhythm and rate: regular and normal      Pulmonary    breath sounds clear to auscultation                    Anesthesia Plan      History & Physical Review  History and physical reviewed and following examination; no interval change.    ASA Status:  3 .    NPO Status:  > 8 hours    Plan for General and ETT with Intravenous and Propofol induction. Maintenance will be Balanced.    PONV prophylaxis:  Ondansetron (or other 5HT-3), Dexamethasone or Solumedrol and Scopolamine patch  S/p Hysterectomy 2013      Postoperative Care  Postoperative pain management:  IV analgesics and Oral pain medications.      Consents  Anesthetic plan, risks, benefits and alternatives discussed with:  Patient..                          .

## 2017-11-22 NOTE — IP AVS SNAPSHOT
[facility]    [address] [address]    Phone:  [phone]                                       After Visit Summary   11/22/2017    Paulette Flores    MRN: [id number]           After Visit Summary Signature Page     I have received my discharge instructions, and my questions have been answered. I have discussed any challenges I see with this plan with the nurse or doctor.    ..........................................................................................................................................  Patient/Patient Representative Signature      ..........................................................................................................................................  Patient Representative Print Name and Relationship to Patient    ..................................................               ................................................  Date                                            Time    ..........................................................................................................................................  Reviewed by Signature/Title    ...................................................              ..............................................  Date                                                            Time

## 2017-11-24 NOTE — ANESTHESIA POSTPROCEDURE EVALUATION
Patient: Paulette Flores    Procedure(s):  LAPAROSCOPIC RIGHT SALPINGO-OOPHORECTOMY excision of right pelvic side wall mass - Wound Class: II-Clean Contaminated    Diagnosis:PELVIC PAIN IN FEMALE, ENDOMETRIOSIS  Diagnosis Additional Information: No value filed.    Anesthesia Type:  General, ETT    Note:  Anesthesia Post Evaluation    Patient location during evaluation: Phase 2, PACU and Bedside  Patient participation: Able to fully participate in evaluation  Level of consciousness: awake and alert  Pain management: adequate  Airway patency: patent  Cardiovascular status: acceptable  Respiratory status: acceptable  Hydration status: stable  PONV: none     Anesthetic complications: None          Last vitals:  Vitals:    11/22/17 1330 11/22/17 1400 11/22/17 1405   BP: 118/78 120/76    Pulse:      Resp: 16 16    Temp:      SpO2: 97% 97% 97%         Electronically Signed By: Gilberto Huizar MD  November 24, 2017  5:38 AM

## 2017-11-27 ENCOUNTER — TELEPHONE (OUTPATIENT)
Dept: OBGYN | Facility: OTHER | Age: 47
End: 2017-11-27

## 2017-11-28 LAB — COPATH REPORT: NORMAL

## 2018-01-03 DIAGNOSIS — G89.18 POST-OP PAIN: ICD-10-CM

## 2018-01-04 RX ORDER — IBUPROFEN 800 MG/1
TABLET, FILM COATED ORAL
Qty: 40 TABLET | Refills: 1 | Status: SHIPPED | OUTPATIENT
Start: 2018-01-04 | End: 2019-02-28

## 2018-01-04 NOTE — TELEPHONE ENCOUNTER
Ibuprofen      Last Written Prescription Date: 11/22/17  Last Fill Quantity: 40,  # refills: 1   Last Office Visit with G, [facility] or [facility] prescribing provider: 11/14/17

## 2018-05-19 ENCOUNTER — APPOINTMENT (OUTPATIENT)
Dept: CT IMAGING | Facility: HOSPITAL | Age: 48
End: 2018-05-19
Attending: EMERGENCY MEDICINE
Payer: COMMERCIAL

## 2018-05-19 ENCOUNTER — HOSPITAL ENCOUNTER (EMERGENCY)
Facility: HOSPITAL | Age: 48
Discharge: HOME | End: 2018-05-19
Attending: EMERGENCY MEDICINE | Admitting: EMERGENCY MEDICINE
Payer: COMMERCIAL

## 2018-05-19 VITALS
DIASTOLIC BLOOD PRESSURE: 87 MMHG | SYSTOLIC BLOOD PRESSURE: 135 MMHG | TEMPERATURE: 97.7 F | OXYGEN SATURATION: 95 % | RESPIRATION RATE: 14 BRPM | HEART RATE: 80 BPM

## 2018-05-19 DIAGNOSIS — R10.84 ABDOMINAL PAIN, GENERALIZED: ICD-10-CM

## 2018-05-19 DIAGNOSIS — A08.4 VIRAL GASTROENTERITIS: Primary | ICD-10-CM

## 2018-05-19 LAB
ALBUMIN SERPL-MCNC: 3.9 G/DL (ref 3.4–5)
ALP SERPL-CCNC: 72 U/L (ref 40–150)
ALT SERPL W P-5'-P-CCNC: 92 U/L (ref 0–50)
AMYLASE SERPL-CCNC: 19 U/L (ref 30–110)
ANION GAP SERPL CALCULATED.3IONS-SCNC: 10 MMOL/L (ref 3–14)
AST SERPL W P-5'-P-CCNC: 33 U/L (ref 0–45)
BASOPHILS # BLD AUTO: 0 10E9/L (ref 0–0.2)
BASOPHILS NFR BLD AUTO: 0.3 %
BILIRUB SERPL-MCNC: 0.5 MG/DL (ref 0.2–1.3)
BUN SERPL-MCNC: 18 MG/DL (ref 7–30)
CALCIUM SERPL-MCNC: 9.2 MG/DL (ref 8.5–10.1)
CHLORIDE SERPL-SCNC: 104 MMOL/L (ref 94–109)
CO2 SERPL-SCNC: 23 MMOL/L (ref 20–32)
CREAT SERPL-MCNC: 0.72 MG/DL (ref 0.52–1.04)
CRP SERPL-MCNC: 7.7 MG/L (ref 0–8)
DIFFERENTIAL METHOD BLD: NORMAL
EOSINOPHIL # BLD AUTO: 0.3 10E9/L (ref 0–0.7)
EOSINOPHIL NFR BLD AUTO: 2.5 %
ERYTHROCYTE [DISTWIDTH] IN BLOOD BY AUTOMATED COUNT: 13.8 % (ref 10–15)
ETHANOL SERPL-MCNC: <0.01 G/DL
GFR SERPL CREATININE-BSD FRML MDRD: 87 ML/MIN/1.7M2
GLUCOSE SERPL-MCNC: 143 MG/DL (ref 70–99)
HCT VFR BLD AUTO: 41.8 % (ref 35–47)
HGB BLD-MCNC: 14.3 G/DL (ref 11.7–15.7)
IMM GRANULOCYTES # BLD: 0 10E9/L (ref 0–0.4)
IMM GRANULOCYTES NFR BLD: 0.3 %
LACTATE SERPL-SCNC: 1.5 MMOL/L (ref 0.4–2)
LIPASE SERPL-CCNC: 99 U/L (ref 73–393)
LYMPHOCYTES # BLD AUTO: 1.6 10E9/L (ref 0.8–5.3)
LYMPHOCYTES NFR BLD AUTO: 14.8 %
MCH RBC QN AUTO: 29.9 PG (ref 26.5–33)
MCHC RBC AUTO-ENTMCNC: 34.2 G/DL (ref 31.5–36.5)
MCV RBC AUTO: 87 FL (ref 78–100)
MONOCYTES # BLD AUTO: 0.8 10E9/L (ref 0–1.3)
MONOCYTES NFR BLD AUTO: 7.4 %
NEUTROPHILS # BLD AUTO: 7.9 10E9/L (ref 1.6–8.3)
NEUTROPHILS NFR BLD AUTO: 74.7 %
NRBC # BLD AUTO: 0 10*3/UL
NRBC BLD AUTO-RTO: 0 /100
PLATELET # BLD AUTO: 223 10E9/L (ref 150–450)
POTASSIUM SERPL-SCNC: 3.6 MMOL/L (ref 3.4–5.3)
PROT SERPL-MCNC: 7.6 G/DL (ref 6.8–8.8)
RBC # BLD AUTO: 4.79 10E12/L (ref 3.8–5.2)
SODIUM SERPL-SCNC: 137 MMOL/L (ref 133–144)
WBC # BLD AUTO: 10.5 10E9/L (ref 4–11)

## 2018-05-19 PROCEDURE — 25000132 ZZH RX MED GY IP 250 OP 250 PS 637: Performed by: EMERGENCY MEDICINE

## 2018-05-19 PROCEDURE — 83605 ASSAY OF LACTIC ACID: CPT | Performed by: EMERGENCY MEDICINE

## 2018-05-19 PROCEDURE — 36415 COLL VENOUS BLD VENIPUNCTURE: CPT | Performed by: EMERGENCY MEDICINE

## 2018-05-19 PROCEDURE — 25000128 H RX IP 250 OP 636: Performed by: EMERGENCY MEDICINE

## 2018-05-19 PROCEDURE — 96374 THER/PROPH/DIAG INJ IV PUSH: CPT | Mod: XU

## 2018-05-19 PROCEDURE — 83690 ASSAY OF LIPASE: CPT | Performed by: EMERGENCY MEDICINE

## 2018-05-19 PROCEDURE — 85025 COMPLETE CBC W/AUTO DIFF WBC: CPT | Performed by: EMERGENCY MEDICINE

## 2018-05-19 PROCEDURE — 99284 EMERGENCY DEPT VISIT MOD MDM: CPT | Mod: Z6 | Performed by: EMERGENCY MEDICINE

## 2018-05-19 PROCEDURE — 82150 ASSAY OF AMYLASE: CPT | Performed by: EMERGENCY MEDICINE

## 2018-05-19 PROCEDURE — 96361 HYDRATE IV INFUSION ADD-ON: CPT

## 2018-05-19 PROCEDURE — 80320 DRUG SCREEN QUANTALCOHOLS: CPT | Performed by: EMERGENCY MEDICINE

## 2018-05-19 PROCEDURE — 99285 EMERGENCY DEPT VISIT HI MDM: CPT | Mod: 25

## 2018-05-19 PROCEDURE — 80053 COMPREHEN METABOLIC PANEL: CPT | Performed by: EMERGENCY MEDICINE

## 2018-05-19 PROCEDURE — 25000125 ZZHC RX 250: Performed by: EMERGENCY MEDICINE

## 2018-05-19 PROCEDURE — 25000128 H RX IP 250 OP 636

## 2018-05-19 PROCEDURE — 74177 CT ABD & PELVIS W/CONTRAST: CPT | Mod: TC

## 2018-05-19 PROCEDURE — 86140 C-REACTIVE PROTEIN: CPT | Performed by: EMERGENCY MEDICINE

## 2018-05-19 RX ORDER — IOPAMIDOL 612 MG/ML
100 INJECTION, SOLUTION INTRAVASCULAR ONCE
Status: COMPLETED | OUTPATIENT
Start: 2018-05-19 | End: 2018-05-19

## 2018-05-19 RX ORDER — ONDANSETRON 4 MG
4 TABLET,DISINTEGRATING ORAL EVERY 8 HOURS PRN
Qty: 4 TABLET | Refills: 0 | Status: SHIPPED | OUTPATIENT
Start: 2018-05-19 | End: 2018-08-06

## 2018-05-19 RX ORDER — ONDANSETRON 2 MG/ML
4 INJECTION INTRAMUSCULAR; INTRAVENOUS ONCE
Status: DISCONTINUED | OUTPATIENT
Start: 2018-05-19 | End: 2018-05-19 | Stop reason: HOSPADM

## 2018-05-19 RX ORDER — SODIUM CHLORIDE 9 MG/ML
1000 INJECTION, SOLUTION INTRAVENOUS CONTINUOUS
Status: DISCONTINUED | OUTPATIENT
Start: 2018-05-19 | End: 2018-05-19 | Stop reason: HOSPADM

## 2018-05-19 RX ORDER — ONDANSETRON 2 MG/ML
4 INJECTION INTRAMUSCULAR; INTRAVENOUS ONCE
Status: COMPLETED | OUTPATIENT
Start: 2018-05-19 | End: 2018-05-19

## 2018-05-19 RX ORDER — ONDANSETRON 2 MG/ML
INJECTION INTRAMUSCULAR; INTRAVENOUS
Status: COMPLETED
Start: 2018-05-19 | End: 2018-05-19

## 2018-05-19 RX ADMIN — LIDOCAINE HYDROCHLORIDE: 20 SOLUTION ORAL; TOPICAL at 00:55

## 2018-05-19 RX ADMIN — SODIUM CHLORIDE 1000 ML: 9 INJECTION, SOLUTION INTRAVENOUS at 00:55

## 2018-05-19 RX ADMIN — ONDANSETRON 4 MG: 2 INJECTION INTRAMUSCULAR; INTRAVENOUS at 00:55

## 2018-05-19 RX ADMIN — IOPAMIDOL 100 ML: 612 INJECTION, SOLUTION INTRAVENOUS at 01:23

## 2018-05-19 NOTE — ED AVS SNAPSHOT
[facility]    [address] [address]    Phone:  [phone]                                       Paulette Flores   MRN: [id number]    Department:  [facility]   Date of Visit:  5/19/2018           After Visit Summary Signature Page     I have received my discharge instructions, and my questions have been answered. I have discussed any challenges I see with this plan with the nurse or doctor.    ..........................................................................................................................................  Patient/Patient Representative Signature      ..........................................................................................................................................  Patient Representative Print Name and Relationship to Patient    ..................................................               ................................................  Date                                            Time    ..........................................................................................................................................  Reviewed by Signature/Title    ...................................................              ..............................................  Date                                                            Time

## 2018-05-19 NOTE — ED PROVIDER NOTES
History     Chief Complaint   Patient presents with     Abdominal Pain     HPI  Paulette Flores is a 47 year old female who presents to the emergency department with complaints of generalized abdominal pain that started around 8:00 this evening.  Patient feels nauseated but has not vomited.  She was taking a bottle of beer when her symptoms started, she only managed to drink half a bottle of beer.  She has had 2 episodes of loose watery diarrhea that had dark in color.  The pain is rated as 5/10, does not radiate, no known aggravating or relieving factors.  Patient denies any night sweats or weight loss, no recent travel or sick contacts.    Problem List:    Patient Active Problem List    Diagnosis Date Noted     ACP (advance care planning) 07/20/2016     Priority: Medium     Advance Care Planning 7/20/2016: ACP Review of Chart / Resources Provided:  Reviewed chart for advance care plan.  Paulette Flores has been provided information and resources to begin or update their advance care plan.  Added by Katia Champagne           Essential hypertension with goal blood pressure less than 140/90 07/20/2016     Priority: Medium     Migraine 02/23/2001     Priority: Medium     Problem list name updated by automated process. 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     Infections of genitourinary tract antepartum 05/27/2009     Metrorrhagia 12/06/2001     Migraine, unspecified, without mention of intractable migraine without mention of status migrainosus 02/23/2001     Pain in limb 08/22/2002     Unspecified disorder of female genital organs 12/13/2006     Unspecified hemorrhage in early pregnancy, antepartum 12/04/2006       Past Surgical History:    Past Surgical History:    Procedure Laterality Date     BREAST SURGERY       CHOLECYSTECTOMY       GYN SURGERY      laparoscopic supracervical hysterectomy,left tube and ovary removal,fulguration of endometriosis 8/28/13--[facility] [place]     LAPAROSCOPIC HYSTERECTOMY SUPRACERVICAL, BILATERAL SALPINGO-OOPHORECTOMY, COMBINED  8/28/2013    Procedure: COMBINED LAPAROSCOPIC HYSTERECTOMY SUPRACERVICAL, SALPINGO-OOPHORECTOMY;  LAPAROSCOPIC SUPRACERVICAL HYSTERECTOMY/LAPAROSCOPIC LEFT SALPINGO-OOPHORECTOMY;  Surgeon: Jerrell Thornton MD;  Location: [facility] OR     LAPAROSCOPIC SALPINGO-OOPHORECTOMY Right 11/22/2017    Procedure: LAPAROSCOPIC SALPINGO-OOPHORECTOMY;  LAPAROSCOPIC RIGHT SALPINGO-OOPHORECTOMY excision of right pelvic side wall mass;  Surgeon: Jerrell Thornton MD;  Location: [facility] OR     LAPAROSCOPY DIAGNOSTIC (GENERAL)       WISDOM TEETH         Family History:    Family History   Problem Relation Age of Onset     Myocardial Infarction Paternal Grandfather 45     MI, cause of death     CANCER Maternal Aunt      Cervical     Hypertension Maternal Grandmother      OSTEOPOROSIS Maternal Grandmother      Myocardial Infarction Father        Social History:  Marital Status:   [2]  Social History   Substance Use Topics     Smoking status: Never Smoker     Smokeless tobacco: Never Used     Alcohol use No        Medications:      aspirin 81 MG chewable tablet   ibuprofen (ADVIL/MOTRIN) 800 MG tablet   ondansetron (ZOFRAN-ODT) 4 MG disintegrating tablet   valsartan-hydrochlorothiazide (DIOVAN-HCT) 80-12.5 MG per tablet         Review of Systems   All other systems reviewed and are negative.      Physical Exam   BP: 135/84  Pulse: 100  Temp: 97.9  F (36.6  C)  Resp: 18  SpO2: 99 %      Physical Exam   Constitutional: She is oriented to person, place, and time. She appears well-developed and well-nourished. No distress.   HENT:   Head: Normocephalic and atraumatic.   Eyes: Pupils are equal, round, and reactive to light.   Neck: Neck supple.    Cardiovascular: Normal rate, regular rhythm and normal heart sounds.    Pulmonary/Chest: Breath sounds normal. No respiratory distress. She has no wheezes.   Abdominal: Soft. Bowel sounds are normal. She exhibits no distension. There is no tenderness.   Neurological: She is alert and oriented to person, place, and time.   Skin: She is not diaphoretic.   Nursing note and vitals reviewed.      ED Course   Patient evaluated and laboratory tests ordered.  Started on IV fluid hydration.  Started on IV Zofran and GI cocktail.    ED Course     Procedures      Results for orders placed or performed during the hospital encounter of 05/19/18 (from the past 24 hour(s))   CBC with platelets differential   Result Value Ref Range    WBC 10.5 4.0 - 11.0 10e9/L    RBC Count 4.79 3.8 - 5.2 10e12/L    Hemoglobin 14.3 11.7 - 15.7 g/dL    Hematocrit 41.8 35.0 - 47.0 %    MCV 87 78 - 100 fl    MCH 29.9 26.5 - 33.0 pg    MCHC 34.2 31.5 - 36.5 g/dL    RDW 13.8 10.0 - 15.0 %    Platelet Count 223 150 - 450 10e9/L    Diff Method Automated Method     % Neutrophils 74.7 %    % Lymphocytes 14.8 %    % Monocytes 7.4 %    % Eosinophils 2.5 %    % Basophils 0.3 %    % Immature Granulocytes 0.3 %    Nucleated RBCs 0 0 /100    Absolute Neutrophil 7.9 1.6 - 8.3 10e9/L    Absolute Lymphocytes 1.6 0.8 - 5.3 10e9/L    Absolute Monocytes 0.8 0.0 - 1.3 10e9/L    Absolute Eosinophils 0.3 0.0 - 0.7 10e9/L    Absolute Basophils 0.0 0.0 - 0.2 10e9/L    Abs Immature Granulocytes 0.0 0 - 0.4 10e9/L    Absolute Nucleated RBC 0.0    Lactic acid   Result Value Ref Range    Lactic Acid 1.5 0.4 - 2.0 mmol/L   Lipase   Result Value Ref Range    Lipase 99 73 - 393 U/L   Comprehensive metabolic panel   Result Value Ref Range    Sodium 137 133 - 144 mmol/L    Potassium 3.6 3.4 - 5.3 mmol/L    Chloride 104 94 - 109 mmol/L    Carbon Dioxide 23 20 - 32 mmol/L    Anion Gap 10 3 - 14 mmol/L    Glucose 143 (H) 70 - 99 mg/dL    Urea Nitrogen 18 7 - 30 mg/dL    Creatinine  0.72 0.52 - 1.04 mg/dL    GFR Estimate 87 >60 mL/min/1.7m2    GFR Estimate If Black >90 >60 mL/min/1.7m2    Calcium 9.2 8.5 - 10.1 mg/dL    Bilirubin Total 0.5 0.2 - 1.3 mg/dL    Albumin 3.9 3.4 - 5.0 g/dL    Protein Total 7.6 6.8 - 8.8 g/dL    Alkaline Phosphatase 72 40 - 150 U/L    ALT 92 (H) 0 - 50 U/L    AST 33 0 - 45 U/L   Amylase   Result Value Ref Range    Amylase 19 (L) 30 - 110 U/L   CRP inflammation   Result Value Ref Range    CRP Inflammation 7.7 0.0 - 8.0 mg/L   Alcohol ethyl   Result Value Ref Range    Ethanol g/dL <0.01 0.01 g/dL       Medications   0.9% sodium chloride BOLUS (0 mLs Intravenous Stopped 5/19/18 0211)   lidocaine (viscous) (XYLOCAINE) 2 % 15 mL, alum & mag hydroxide-simethicone (MYLANTA ES/MAALOX  ES) 15 mL GI Cocktail ( Oral Given 5/19/18 0055)   ondansetron (ZOFRAN) injection 4 mg (4 mg Intravenous Given 5/19/18 0055)   iopamidol (ISOVUE-300) IV solution 61% 100 mL (100 mLs Intravenous Given 5/19/18 0123)   sodium chloride (PF) 0.9% PF flush 60 mL (60 mLs Intravenous Given 5/19/18 0123)       Assessments & Plan (with Medical Decision Making)   Acute viral gastroenteritis: Patient evaluated in the ED and pain was controlled after IV fluids and GI cocktail.  At least 1 L of IV normal saline was given.  Laboratory test done showed normal CBC, CMP, amylase, lipase, CRP, negative blood alcohol level.  CT scan done showed fluid-filled stomach, small bowel and large bowel consistent with acute gastroenteritis.  No evidence of obstruction.  Patient now being discharged home on Zofran ODT as needed.  Encouraged to drink plenty of fluids.  Subsequently discharged home.    I have reviewed the nursing notes.    I have reviewed the findings, diagnosis, plan and need for follow up with the patient.    Discharge Medication List as of 5/19/2018  2:13 AM      START taking these medications    Details   ondansetron (ZOFRAN-ODT) 4 MG disintegrating tablet Take 1 tablet (4 mg) by mouth every 8 hours as  needed for nausea, Disp-4 tablet, R-0, Local Print             Final diagnoses:   Abdominal pain, generalized   Viral gastroenteritis       5/19/2018   [facility]     Andrew Douglas MD  05/19/18 4420

## 2018-05-19 NOTE — DISCHARGE INSTRUCTIONS
Viral Gastroenteritis (Adult)    Gastroenteritis is commonly called the stomach flu. It is most often caused by a virus that affects the stomach and intestinal tract and usually lasts from 2 to 7 days. Common viruses causing gastroenteritis include norovirus, rotavirus, and hepatitis A. Non-viral causes of gastroenteritis include bacteria, parasites, and toxins.  The danger from repeated vomiting or diarrhea is dehydration. This is the loss of too much fluid from the body. When this occurs, body fluids must be replaced. Antibiotics do not help with this illness because it is usually viral.Simple home treatment will be helpful.  Symptoms of viral gastroenteritis may include:    Watery, loose stools    Stomach pain or abdominal cramps    Fever and chills    Nausea and vomiting    Loss of bowel control    Headache  Home care  Gastroenteritis is transmitted by contact with the stool or vomit of an infected person. This can occur from person to person or from contact with a contaminated surface.  Follow these guidelines when caring for yourself at home:    If symptoms are severe, rest at home for the next 24 hours or until you are feeling better.    Wash your hands with soap and water or use alcohol-based  to prevent the spread of infection. Wash your hands after touching anyone who is sick.    Wash your hands or use alcohol-based  after using the toilet and before meals. Clean the toilet after each use.  Remember these tips when preparing food:    People with diarrhea should not prepare or serve food to others. When preparing foods, wash your hands before and after.    Wash your hands after using cutting boards, countertops, knives, or utensils that have been in contact with raw food.    Keep uncooked meats away from cooked and ready-to-eat foods.  Medicine  You may use acetaminophen or NSAID medicines like ibuprofen or naproxen to control fever unless another medicine was given. If you have  chronic liver or kidney disease, talk with your healthcare provider before using these medicines. Also talk with your provider if you've had a stomach ulcer or gastrointestinal bleeding. Don't give aspirin to anyone under 18 years of age who is ill with a fever. It may cause severe liver damage. Don't use NSAIDS is you are already taking one for another condition (like arthritis) or are on aspirin (such as for heart disease or after a stroke).  If medicine for vomiting or diarrhea are prescribed, take these only as directed. Do not take over-the-counter medicines for vomiting or diarrhea unless instructed by your healthcare provider.  Diet  Follow these guidelines for food:    Water and liquids are important so you don't get dehydrated. Drink a small amount at a time or suck on ice chips if you are vomiting.    If you eat, avoid fatty, greasy, spicy, or fried foods.    Don't eat dairy if you have diarrhea. This can make diarrhea worse.    Avoid tobacco, alcohol, and caffeine which may worsen symptoms.  During the first 24 hours (the first full day), follow the diet below:    Beverages. Sports drinks, soft drinks without caffeine, ginger ale, mineral water (plain or flavored), decaffeinated tea and coffee. If you are very dehydrated, sports drinks aren't a good choice. They have too much sugar and not enough electrolytes. In this case, commercially available products called oral rehydration solutions, are best.    Soups. Eat clear broth, consommé, and bouillon.    Desserts. Eat gelatin, popsicles, and fruit juice bars.  During the next 24 hours (the second day), you may add the following to the above:    Hot cereal, plain toast, bread, rolls, and crackers    Plain noodles, rice, mashed potatoes, chicken noodle or rice soup    Unsweetened canned fruit (avoid pineapple), bananas    Limit fat intake to less than 15 grams per day. Do this by avoiding margarine, butter, oils, mayonnaise, sauces, gravies, fried foods,  peanut butter, meat, poultry, and fish.    Limit fiber and avoid raw or cooked vegetables, fresh fruits (except bananas), and bran cereals.    Limit caffeine and chocolate. Don't use spices or seasonings other than salt.    Limit dairy products.    Avoid alcohol.  During the next 24 hours:    Gradually resume a normal diet as you feel better and your symptoms improve.    If at any time it starts getting worse again, go back to clear liquids until you feel better.  Follow-up care  Follow up with your healthcare provider, or as advised. Call your provider if you don't get better within 24 hours or if diarrhea lasts more than a week. Also follow up if you are unable to keep down liquids and get dehydrated. If a stool (diarrhea) sample was taken, call as directed for the results.  Call 911  Call 911 if any of these occur:    Trouble breathing    Chest pain    Confused    Severe drowsiness or trouble awakening    Fainting or loss of consciousness    Rapid heart rate    Seizure    Stiff neck  When to seek medical advice  Call your healthcare provider right away if any of these occur:    Abdominal pain that gets worse    Continued vomiting (unable to keep liquids down)    Frequent diarrhea (more than 5 times a day)    Blood in vomit or stool (black or red color)    Dark urine, reduced urine output, or extreme thirst    Weakness or dizziness    Drowsiness    Fever of 100.4 F (38 C) or higher, or as directed by your healthcare provider    New rash  Date Last Reviewed: 1/3/2016    4311-2490 [employer]. [address] [place] [address], [place] [address]. All rights reserved. This information is not intended as a substitute for professional medical care. Always follow your healthcare professional's instructions.      What to expect when you have contrast    During your exam, we will inject  contrast  into your vein or artery. (Contrast is a clear liquid with iodine in it. It shows up on X-rays.)    You may feel warm or  hot. You may have a metal taste in your mouth and a slight upset stomach. You may also feel pressure near the kidneys and bladder. These effects will last about 1 to 3 minutes.    Please tell us if you have:    Sneezing     Itching    Hives     Swelling in the face    A hoarse voice    Breathing problems    Other new symptoms    Serious problems are rare.  They may include:    Irregular heartbeat     Seizures    Kidney failure              Tissue damage    Shock      Death    If you have any problems during the exam, we  will treat them right away.    When you get home    Call your hospital if you have any new symptoms in the next 2 days, like hives or swelling. (Phone numbers are at the bottom of this page.) Or call your family doctor.     If you have wheezing or trouble breathing, call 911.    Self-care  -Drink at least 4 extra glasses of water today.   This reduces the stress on your kidneys.  -Keep taking your regular medicines.    The contrast will pass out of your body in your  Urine(pee). This will happen in the next 24 hours. You  will not feel this. Your urine will not  change color.    If you have kidney problems or take metformin    Drink 4 to 8 large glasses of water for the next  2 days, if you are not on a fluid restriction.    ?If you take metformin (Glucophage or Glucovance) for diabetes, keep taking it.      ?Your kidney function tests are abnormal.  If you take Metformin, do not take it for 48 hours. Please go to your clinic for a blood test within 3 days after your exam before the restarting this medicine.     (Note to provider:please give patient prescription for lab tests.)    ?Special instructions:     I have read and understand the above information.    Patient Sign Here:______________________________________Date:________Time:______    Staff Sign Here:________________________________________Date:_______Time:______      Radiology Departments:     ?[facility]: [phone] ?[place]:  [phone]     ?[place]: [phone] ?[facility]:[phone]      ?[facility]: [phone]  ?[place]: [phone]  ?[place]:[phone]    ?[facility]:[phone]  ?[facility]:[phone]

## 2018-05-19 NOTE — ED AVS SNAPSHOT
[facility]    [address] [address]    Phone:  [phone]                                       Paulette Flores   MRN: [id number]    Department:  [facility]   Date of Visit:  5/19/2018           Patient Information     Date Of Birth          1970        Your diagnoses for this visit were:     Abdominal pain, generalized     Viral gastroenteritis        You were seen by Andrew Douglas MD.      Follow-up Information     Follow up with Manohar Villareal MD.    Specialty:  Family Practice    Why:  As needed, Re-evaluation, Continuity of care    Contact information:    [address] [place] [id number]  [phone]          Discharge Instructions           Viral Gastroenteritis (Adult)    Gastroenteritis is commonly called the stomach flu. It is most often caused by a virus that affects the stomach and intestinal tract and usually lasts from 2 to 7 days. Common viruses causing gastroenteritis include norovirus, rotavirus, and hepatitis A. Non-viral causes of gastroenteritis include bacteria, parasites, and toxins.  The danger from repeated vomiting or diarrhea is dehydration. This is the loss of too much fluid from the body. When this occurs, body fluids must be replaced. Antibiotics do not help with this illness because it is usually viral.Simple home treatment will be helpful.  Symptoms of viral gastroenteritis may include:    Watery, loose stools    Stomach pain or abdominal cramps    Fever and chills    Nausea and vomiting    Loss of bowel control    Headache  Home care  Gastroenteritis is transmitted by contact with the stool or vomit of an infected person. This can occur from person to person or from contact with a contaminated surface.  Follow these guidelines when caring for yourself at home:    If symptoms are severe, rest at home for the next 24 hours or until you are feeling better.    Wash your hands with soap and water or use alcohol-based   to prevent the spread of infection. Wash your hands after touching anyone who is sick.    Wash your hands or use alcohol-based  after using the toilet and before meals. Clean the toilet after each use.  Remember these tips when preparing food:    People with diarrhea should not prepare or serve food to others. When preparing foods, wash your hands before and after.    Wash your hands after using cutting boards, countertops, knives, or utensils that have been in contact with raw food.    Keep uncooked meats away from cooked and ready-to-eat foods.  Medicine  You may use acetaminophen or NSAID medicines like ibuprofen or naproxen to control fever unless another medicine was given. If you have chronic liver or kidney disease, talk with your healthcare provider before using these medicines. Also talk with your provider if you've had a stomach ulcer or gastrointestinal bleeding. Don't give aspirin to anyone under 18 years of age who is ill with a fever. It may cause severe liver damage. Don't use NSAIDS is you are already taking one for another condition (like arthritis) or are on aspirin (such as for heart disease or after a stroke).  If medicine for vomiting or diarrhea are prescribed, take these only as directed. Do not take over-the-counter medicines for vomiting or diarrhea unless instructed by your healthcare provider.  Diet  Follow these guidelines for food:    Water and liquids are important so you don't get dehydrated. Drink a small amount at a time or suck on ice chips if you are vomiting.    If you eat, avoid fatty, greasy, spicy, or fried foods.    Don't eat dairy if you have diarrhea. This can make diarrhea worse.    Avoid tobacco, alcohol, and caffeine which may worsen symptoms.  During the first 24 hours (the first full day), follow the diet below:    Beverages. Sports drinks, soft drinks without caffeine, ginger ale, mineral water (plain or flavored), decaffeinated tea and coffee. If  you are very dehydrated, sports drinks aren't a good choice. They have too much sugar and not enough electrolytes. In this case, commercially available products called oral rehydration solutions, are best.    Soups. Eat clear broth, consommé, and bouillon.    Desserts. Eat gelatin, popsicles, and fruit juice bars.  During the next 24 hours (the second day), you may add the following to the above:    Hot cereal, plain toast, bread, rolls, and crackers    Plain noodles, rice, mashed potatoes, chicken noodle or rice soup    Unsweetened canned fruit (avoid pineapple), bananas    Limit fat intake to less than 15 grams per day. Do this by avoiding margarine, butter, oils, mayonnaise, sauces, gravies, fried foods, peanut butter, meat, poultry, and fish.    Limit fiber and avoid raw or cooked vegetables, fresh fruits (except bananas), and bran cereals.    Limit caffeine and chocolate. Don't use spices or seasonings other than salt.    Limit dairy products.    Avoid alcohol.  During the next 24 hours:    Gradually resume a normal diet as you feel better and your symptoms improve.    If at any time it starts getting worse again, go back to clear liquids until you feel better.  Follow-up care  Follow up with your healthcare provider, or as advised. Call your provider if you don't get better within 24 hours or if diarrhea lasts more than a week. Also follow up if you are unable to keep down liquids and get dehydrated. If a stool (diarrhea) sample was taken, call as directed for the results.  Call 911  Call 911 if any of these occur:    Trouble breathing    Chest pain    Confused    Severe drowsiness or trouble awakening    Fainting or loss of consciousness    Rapid heart rate    Seizure    Stiff neck  When to seek medical advice  Call your healthcare provider right away if any of these occur:    Abdominal pain that gets worse    Continued vomiting (unable to keep liquids down)    Frequent diarrhea (more than 5 times a  day)    Blood in vomit or stool (black or red color)    Dark urine, reduced urine output, or extreme thirst    Weakness or dizziness    Drowsiness    Fever of 100.4 F (38 C) or higher, or as directed by your healthcare provider    New rash  Date Last Reviewed: 1/3/2016    6058-5978 [employer]. [address] [place] [address], [place] [address]. All rights reserved. This information is not intended as a substitute for professional medical care. Always follow your healthcare professional's instructions.      What to expect when you have contrast    During your exam, we will inject  contrast  into your vein or artery. (Contrast is a clear liquid with iodine in it. It shows up on X-rays.)    You may feel warm or hot. You may have a metal taste in your mouth and a slight upset stomach. You may also feel pressure near the kidneys and bladder. These effects will last about 1 to 3 minutes.    Please tell us if you have:    Sneezing     Itching    Hives     Swelling in the face    A hoarse voice    Breathing problems    Other new symptoms    Serious problems are rare.  They may include:    Irregular heartbeat     Seizures    Kidney failure              Tissue damage    Shock      Death    If you have any problems during the exam, we  will treat them right away.    When you get home    Call your hospital if you have any new symptoms in the next 2 days, like hives or swelling. (Phone numbers are at the bottom of this page.) Or call your family doctor.     If you have wheezing or trouble breathing, call 911.    Self-care  -Drink at least 4 extra glasses of water today.   This reduces the stress on your kidneys.  -Keep taking your regular medicines.    The contrast will pass out of your body in your  Urine(pee). This will happen in the next 24 hours. You  will not feel this. Your urine will not  change color.    If you have kidney problems or take metformin    Drink 4 to 8 large glasses of water for the next  2 days, if  you are not on a fluid restriction.    ?If you take metformin (Glucophage or Glucovance) for diabetes, keep taking it.      ?Your kidney function tests are abnormal.  If you take Metformin, do not take it for 48 hours. Please go to your clinic for a blood test within 3 days after your exam before the restarting this medicine.     (Note to provider:please give patient prescription for lab tests.)    ?Special instructions:     I have read and understand the above information.    Patient Sign Here:______________________________________Date:________Time:______    Staff Sign Here:________________________________________Date:_______Time:______      Radiology Departments:     ?[facility]: [phone] ?[facility]: [phone]     ?[facility]: [phone] ?[facility] [place]:[phone]      ?[facility]: [phone]  ?[facility]: [phone]  ?[facility]:[phone]    ?[facility]:[phone]  ?[facility]:[phone]       Review of your medicines      START taking        Dose / Directions Last dose taken    ondansetron 4 MG disintegrating tablet   Commonly known as:  ZOFRAN-ODT   Dose:  4 mg   Quantity:  4 tablet        Take 1 tablet (4 mg) by mouth every 8 hours as needed for nausea   Refills:  0          Our records show that you are taking the medicines listed below. If these are incorrect, please call your family doctor or clinic.        Dose / Directions Last dose taken    aspirin 81 MG chewable tablet   Dose:  81 mg   Quantity:  90 tablet        Take 1 tablet (81 mg) by mouth daily   Refills:  3        ibuprofen 800 MG tablet   Commonly known as:  ADVIL/MOTRIN   Quantity:  40 tablet        TAKE 1 TABLET BY MOUTH EVERY 8 HOURS AS NEEDED FOR MODERATE PAIN   Refills:  1        valsartan-hydrochlorothiazide 80-12.5 MG per tablet   Commonly known as:  DIOVAN-HCT   Dose:  1 tablet   Quantity:  90 tablet        Take 1 tablet by mouth daily   Refills:  3                Prescriptions were sent or printed at these locations (1  Prescription)                   [facility] - [place], [place] - [address]   [address] [place] [address] [place] [address]    Telephone:  [phone]   Fax:  [phone]   Hours:                  Printed at Department/Unit printer (1 of 1)         ondansetron (ZOFRAN-ODT) 4 MG disintegrating tablet                Procedures and tests performed during your visit     Alcohol ethyl    Amylase    CBC with platelets differential    CRP inflammation    CT Abdomen Pelvis w Contrast    Comprehensive metabolic panel    Lactic acid    Lipase      Orders Needing Specimen Collection     Ordered          05/19/18 0053  Occult blood stool - ROUTINE, [facility]: Routine, Status: Sent     Scheduled Task Status   05/19/18 0054 [facility] Reminder: Open   Order Class:  PCU Collect                  Pending Results     Date and Time Order Name Status Description    5/19/2018 0057 CT Abdomen Pelvis w Contrast In process             Pending Culture Results     No orders found from 5/17/2018 to 5/20/2018.            Thank you for choosing [facility]       Thank you for choosing [facility] for your care. Our goal is always to provide you with excellent care. Hearing back from our patients is one way we can continue to improve our services. Please take a few minutes to complete the written survey that you may receive in the mail after you visit with us. Thank you!        [employer] Information     [employer] gives you secure access to your electronic health record. If you see a primary care provider, you can also send messages to your care team and make appointments. If you have questions, please call your primary care clinic.  If you do not have a primary care provider, please call [phone] and they will assist you.        Care EveryWhere ID     This is your Care EveryWhere ID. This could be used by other organizations to access your [facility] medical records  [id number]        Equal Access to Services     SUMAN TOMPKINS AH: Alexandrea barton  hilario Velarde, fabian valladaresmabelgica mosher. So [facility] [phone].    ATENCIÓN: Si habla español, tiene a booker disposición servicios gratuitos de asistencia lingüística. Llame al [phone].    We comply with applicable federal civil rights laws and [place] laws. We do not discriminate on the basis of race, color, national origin, age, disability, sex, sexual orientation, or gender identity.            After Visit Summary       This is your record. Keep this with you and show to your community pharmacist(s) and doctor(s) at your next visit.

## 2018-08-06 ENCOUNTER — TELEPHONE (OUTPATIENT)
Dept: FAMILY MEDICINE | Facility: OTHER | Age: 48
End: 2018-08-06

## 2018-08-06 ENCOUNTER — OFFICE VISIT (OUTPATIENT)
Dept: FAMILY MEDICINE | Facility: OTHER | Age: 48
End: 2018-08-06
Attending: FAMILY MEDICINE
Payer: COMMERCIAL

## 2018-08-06 VITALS
TEMPERATURE: 98.2 F | WEIGHT: 281 LBS | SYSTOLIC BLOOD PRESSURE: 132 MMHG | DIASTOLIC BLOOD PRESSURE: 84 MMHG | BODY MASS INDEX: 41.5 KG/M2 | HEART RATE: 75 BPM | OXYGEN SATURATION: 96 %

## 2018-08-06 DIAGNOSIS — E66.01 MORBID OBESITY (H): ICD-10-CM

## 2018-08-06 DIAGNOSIS — G47.10 HYPERSOMNOLENCE: ICD-10-CM

## 2018-08-06 DIAGNOSIS — I10 ESSENTIAL HYPERTENSION WITH GOAL BLOOD PRESSURE LESS THAN 140/90: ICD-10-CM

## 2018-08-06 DIAGNOSIS — G47.33 OSA (OBSTRUCTIVE SLEEP APNEA): ICD-10-CM

## 2018-08-06 DIAGNOSIS — M89.9 DISORDER OF HYOID BONE: Primary | ICD-10-CM

## 2018-08-06 LAB — TSH SERPL DL<=0.005 MIU/L-ACNC: 1.91 MU/L (ref 0.4–4)

## 2018-08-06 PROCEDURE — 84443 ASSAY THYROID STIM HORMONE: CPT | Performed by: FAMILY MEDICINE

## 2018-08-06 PROCEDURE — 36415 COLL VENOUS BLD VENIPUNCTURE: CPT | Performed by: FAMILY MEDICINE

## 2018-08-06 PROCEDURE — 99214 OFFICE O/P EST MOD 30 MIN: CPT | Performed by: FAMILY MEDICINE

## 2018-08-06 ASSESSMENT — ANXIETY QUESTIONNAIRES
3. WORRYING TOO MUCH ABOUT DIFFERENT THINGS: NOT AT ALL
4. TROUBLE RELAXING: NOT AT ALL
1. FEELING NERVOUS, ANXIOUS, OR ON EDGE: NOT AT ALL
2. NOT BEING ABLE TO STOP OR CONTROL WORRYING: NOT AT ALL
6. BECOMING EASILY ANNOYED OR IRRITABLE: NOT AT ALL
GAD7 TOTAL SCORE: 0
5. BEING SO RESTLESS THAT IT IS HARD TO SIT STILL: NOT AT ALL
7. FEELING AFRAID AS IF SOMETHING AWFUL MIGHT HAPPEN: NOT AT ALL

## 2018-08-06 ASSESSMENT — PAIN SCALES - GENERAL: PAINLEVEL: SEVERE PAIN (7)

## 2018-08-06 NOTE — NURSING NOTE
"Chief Complaint   Patient presents with     Pharyngitis       Initial BP (!) 142/92  Pulse 75  Temp 98.2  F (36.8  C) (Tympanic)  Wt 281 lb (127.5 kg)  LMP 08/18/2013  SpO2 96%  BMI 41.5 kg/m2 Estimated body mass index is 41.5 kg/(m^2) as calculated from the following:    Height as of 11/14/17: 5' 9\" (1.753 m).    Weight as of this encounter: 281 lb (127.5 kg).  Medication Reconciliation: complete    Sonali Cerda MA    "

## 2018-08-06 NOTE — TELEPHONE ENCOUNTER
10:38 AM    Reason for Call: OVERBOOK    Patient is having the following symptoms:  Worsening sore throat for 1 weeks.    The patient is requesting an appointment for today 08/06/2018 with Dr Villareal.    Was an appointment offered for this call? No  If yes : Appointment type              Date    Preferred method for responding to this message: Telephone Call  What is your phone number ? [phone]     If we cannot reach you directly, may we leave a detailed response at the number you provided? Yes    Can this message wait until your PCP/provider returns, if unavailable today? Not applicable, provider is in     Anna Weber

## 2018-08-06 NOTE — MR AVS SNAPSHOT
After Visit Summary   8/6/2018    Paulette Flores    MRN: [id number]           Patient Information     Date Of Birth          1970        Visit Information        Provider Department      8/6/2018 1:30 PM Manohar Villareal MD [facility]        Today's Diagnoses     Disorder of hyoid bone    -  1    Morbid obesity (H)        GIN (obstructive sleep apnea)        Hypersomnolence        Essential hypertension with goal blood pressure less than 140/90           Follow-ups after your visit        Additional Services     SLEEP [facility]AL[facility] - [facility] [phone] (Age 5 and up)       Please be aware that coverage of these services is subject to the terms and limitations of your health insurance plan.  Call member services at your health plan with any benefit or coverage questions.      Please bring the following to your appointment:    >>   List of current medications   >>   This referral request   >>   Any documents/labs given to you for this referral                      Future tests that were ordered for you today     Open Future Orders        Priority Expected Expires Ordered    SLEEP EVALUATION & MANAGEMENT REFERRAL - [facility] [phone] (Age 5 and up) Routine  8/6/2019 8/6/2018            Who to contact     If you have questions or need follow up information about today's clinic visit or your schedule please contact [facility] directly at [phone].  Normal or non-critical lab and imaging results will be communicated to you by MyChart, letter or phone within 4 business days after the clinic has received the results. If you do not hear from us within 7 days, please contact the clinic through MyChart or phone. If you have a critical or abnormal lab result, we will notify you by phone as soon as possible.  Submit refill requests through [employer] or call your pharmacy and they will forward the  refill request to us. Please allow 3 business days for your refill to be completed.          Additional Information About Your Visit        MyChart Information     [employer] gives you secure access to your electronic health record. If you see a primary care provider, you can also send messages to your care team and make appointments. If you have questions, please call your primary care clinic.  If you do not have a primary care provider, please call [phone] and they will assist you.        Care EveryWhere ID     This is your Care EveryWhere ID. This could be used by other organizations to access your [place] medical records  [id number]        Your Vitals Were     Pulse Temperature Last Period Pulse Oximetry BMI (Body Mass Index)       75 98.2  F (36.8  C) (Tympanic) 08/18/2013 96% 41.5 kg/m2        Blood Pressure from Last 3 Encounters:   08/06/18 132/84   05/19/18 135/87   11/22/17 120/76    Weight from Last 3 Encounters:   08/06/18 281 lb (127.5 kg)   11/14/17 277 lb 3.2 oz (125.7 kg)   10/13/17 240 lb (108.9 kg)              We Performed the Following     TSH        Primary Care Provider Office Phone # Fax #    Manohar Villareal [phone]       [facility] [place] [address]        Equal Access to Services     SUMAN TOMPKINS : Hadii basim ku hadasho Soomaali, waaxda luqadaha, qaybta kaalmada adeegyada, belgica resendez . So [facility] [phone].    ATENCIÓN: Si habla español, tiene a booker disposición servicios gratuitos de asistencia lingüística. Llame al [phone].    We comply with applicable federal civil rights laws and [place] laws. We do not discriminate on the basis of race, color, national origin, age, disability, sex, sexual orientation, or gender identity.            Thank you!     Thank you for choosing [facility]  for your care. Our goal is always to provide you with excellent care. Hearing back from our patients is one way we can  continue to improve our services. Please take a few minutes to complete the written survey that you may receive in the mail after your visit with us. Thank you!             Your Updated Medication List - Protect others around you: Learn how to safely use, store and throw away your medicines at www.disposemymeds.org.          This list is accurate as of 8/6/18  1:52 PM.  Always use your most recent med list.                   Brand Name Dispense Instructions for use Diagnosis    aspirin 81 MG chewable tablet     90 tablet    Take 1 tablet (81 mg) by mouth daily    Atypical chest pain       ibuprofen 800 MG tablet    ADVIL/MOTRIN    40 tablet    TAKE 1 TABLET BY MOUTH EVERY 8 HOURS AS NEEDED FOR MODERATE PAIN    Post-op pain       valsartan-hydrochlorothiazide 80-12.5 MG per tablet    DIOVAN-HCT    90 tablet    Take 1 tablet by mouth daily    Essential hypertension with goal blood pressure less than 140/90

## 2018-08-06 NOTE — PROGRESS NOTES
SUBJECTIVE:                                                    Paulette Flores is a 47 year old female who presents to clinic today for the following health issues:        Sore throat      Duration: about a week    Description (location/character/radiation): throat    Intensity:  moderate    Accompanying signs and symptoms: feels like something is stuck in her throat. Pain went away with zyrtec but keeps coming back. Is very tired. No cough or other symptoms.    History (similar episodes/previous evaluation): None    Precipitating or alleviating factors: None    Therapies tried and outcome: zyrtec and ibuprofen    Points to area of hyoid where feels something there           Has heroic snoring and witnessed GIN.  Had shared room with friend and she stated she would never sleep in same room together due to snoring and apnea.  Very tired during the day. Risk of wt an double chin.  Wt was up in last year.         Problem list and histories reviewed & adjusted, as indicated.  Additional history: as documented    Labs reviewed in EPIC    ROS:  CONSTITUTIONAL: NEGATIVE for fever, chills, change in weight  RESP: NEGATIVE for significant cough or SOB  CV: NEGATIVE for chest pain, palpitations or peripheral edema    OBJECTIVE:                                                    /84  Pulse 75  Temp 98.2  F (36.8  C) (Tympanic)  Wt 281 lb (127.5 kg)  LMP 08/18/2013  SpO2 96%  BMI 41.5 kg/m2  Body mass index is 41.5 kg/(m^2).   GENERAL: healthy, alert, well nourished, well hydrated, no distress  HENT: ear canals- normal; TMs- normal; Nose- normal; Mouth- no ulcers, no lesions  NECK: no tenderness, no adenopathy, no asymmetry, no masses, no stiffness; thyroid- normal to palpation large neck- double chin. 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 All questions answered.   Discussed in length conservative measures of OTC medications for pain, Ice/Heat, elevation and the concept of R.I.C.E.. Continue behavioral changes and proper body mechanics to allow for healing. Follow up as directed.   F/u if not better     (E66.01) Morbid obesity (H)  Comment: discussed- not helping below.   Plan: SLEEP EVALUATION & MANAGEMENT REFERRAL - [facility] [place] [place] [facility] [phone]         (Age 5 and up), TSH        Discussed behavioral changes to improve obesity including increase diet, decreasing carbs along with other changes in diet and consider seeing dietician or enroll in a watch watcher's type program.     (G47.33) GIN (obstructive sleep apnea)  Comment: clinically has it.    Plan: SLEEP EVALUATION & MANAGEMENT REFERRAL - [facility] [place] [place] [facility] [phone]         (Age 5 and up)        Will refer to sleep clinic    (G47.10) Hypersomnolence  Comment: as above / below   Plan: SLEEP EVALUATION & MANAGEMENT REFERRAL - [facility] [place] [place] [facility] [phone]         (Age 5 and up), TSH            (I10) Essential hypertension with goal blood pressure less than 140/90  Comment: mild elevation   Plan: recheck ok. Continue current medications and behavioral changes.         See Patient Instructions    Manohar Villareal MD  [facility]

## 2018-08-07 ASSESSMENT — ANXIETY QUESTIONNAIRES: GAD7 TOTAL SCORE: 0

## 2018-08-07 ASSESSMENT — PATIENT HEALTH QUESTIONNAIRE - PHQ9: SUM OF ALL RESPONSES TO PHQ QUESTIONS 1-9: 4

## 2018-09-10 ENCOUNTER — OFFICE VISIT (OUTPATIENT)
Dept: OTOLARYNGOLOGY | Facility: OTHER | Age: 48
End: 2018-09-10
Attending: PHYSICIAN ASSISTANT
Payer: COMMERCIAL

## 2018-09-10 VITALS
TEMPERATURE: 97.9 F | HEART RATE: 67 BPM | WEIGHT: 281 LBS | DIASTOLIC BLOOD PRESSURE: 84 MMHG | BODY MASS INDEX: 41.5 KG/M2 | SYSTOLIC BLOOD PRESSURE: 132 MMHG | OXYGEN SATURATION: 95 %

## 2018-09-10 DIAGNOSIS — R40.0 DAYTIME SOMNOLENCE: ICD-10-CM

## 2018-09-10 DIAGNOSIS — R06.83 SNORING: ICD-10-CM

## 2018-09-10 DIAGNOSIS — R09.A2 GLOBUS SENSATION: ICD-10-CM

## 2018-09-10 DIAGNOSIS — J31.0 CHRONIC RHINITIS, UNSPECIFIED TYPE: ICD-10-CM

## 2018-09-10 DIAGNOSIS — K21.9 LPRD (LARYNGOPHARYNGEAL REFLUX DISEASE): Primary | ICD-10-CM

## 2018-09-10 PROCEDURE — 99214 OFFICE O/P EST MOD 30 MIN: CPT | Mod: 25 | Performed by: PHYSICIAN ASSISTANT

## 2018-09-10 PROCEDURE — 31575 DIAGNOSTIC LARYNGOSCOPY: CPT | Performed by: PHYSICIAN ASSISTANT

## 2018-09-10 RX ORDER — OMEPRAZOLE 40 MG/1
40 CAPSULE, DELAYED RELEASE ORAL DAILY
Qty: 30 CAPSULE | Refills: 1 | Status: SHIPPED | OUTPATIENT
Start: 2018-09-10 | End: 2018-10-09

## 2018-09-10 RX ORDER — FLUTICASONE PROPIONATE 50 MCG
2 SPRAY, SUSPENSION (ML) NASAL DAILY
Qty: 1 BOTTLE | Refills: 11 | Status: SHIPPED | OUTPATIENT
Start: 2018-09-10 | End: 2018-10-09

## 2018-09-10 RX ORDER — FLUTICASONE PROPIONATE 50 MCG
1 SPRAY, SUSPENSION (ML) NASAL DAILY
COMMUNITY
End: 2020-08-26

## 2018-09-10 ASSESSMENT — PAIN SCALES - GENERAL: PAINLEVEL: MILD PAIN (3)

## 2018-09-10 NOTE — NURSING NOTE
"Chief Complaint   Patient presents with     Consult     Throat problems- Feels like something stuck in throat.       Initial /84 (BP Location: Right arm, Patient Position: Chair, Cuff Size: Adult Large)  Pulse 67  Temp 97.9  F (36.6  C) (Tympanic)  Wt 281 lb (127.5 kg)  LMP 08/18/2013  SpO2 95%  BMI 41.5 kg/m2 Estimated body mass index is 41.5 kg/(m^2) as calculated from the following:    Height as of 11/14/17: 5' 9\" (1.753 m).    Weight as of this encounter: 281 lb (127.5 kg).  Medication Reconciliation: complete    Nara Coffman LPN    "

## 2018-09-10 NOTE — MR AVS SNAPSHOT
After Visit Summary   9/10/2018    Paulette Flores    MRN: [id number]           Patient Information     Date Of Birth          1970        Visit Information        Provider Department      9/10/2018 9:15 AM Delilah Zuleta PA-C [facility]        Today's Diagnoses     LPRD (laryngopharyngeal reflux disease)    -  1    Globus sensation        Chronic rhinitis, unspecified type          Care Instructions    Obtain water pick for tonsil stones.   Salt water gargles for stones as well.     Start Prilosec 40 mg daily  Increase water intake  Follow LPR instructions  Complete sleep study  Continue with Claritin  Continue with Flonase 2 sprays to each nostril daily    Recheck in 4-6 weeks.     Thank you for allowing MAIRA Lugo and our ENT team to participate in your care.  If your medications are too expensive, please give the nurse a call.  We can possibly change this medication.  If you have a scheduling or an appointment question please contact [facility] Health Unit Coordinator at their direct line [phone].   ALL nursing questions or concerns can be directed to your ENT nurse at: [phone] [facility]    Adult lifestyle changes to prevent LPR reviewed      Avoid eating and drinking within two to three hours prior to bedtime    Do not drink alcohol    Eat small meals and slowly    Limit problem foods:    o Caffeine  o Carbonated drinks  o Chocolate  o Peppermint  o Tomato  o Citrus fruits  o Fatty and fried foods      Lose weight    Quit smoking    Wear loose clothing                Follow-ups after your visit        Your next 10 appointments already scheduled     Sep 25, 2018  2:00 PM CDT   New Sleep Patient with Agapito Childress MD   [facility] ([facility] )    [address] [place] [address]   [phone]              Who to contact     If you have questions or need follow up information about today's clinic visit or your schedule please contact [place]  [facility] directly at [phone].  Normal or non-critical lab and imaging results will be communicated to you by [employer], letter or phone within 4 business days after the clinic has received the results. If you do not hear from us within 7 days, please contact the clinic through [employer] or phone. If you have a critical or abnormal lab result, we will notify you by phone as soon as possible.  Submit refill requests through [employer] or call your pharmacy and they will forward the refill request to us. Please allow 3 business days for your refill to be completed.          Additional Information About Your Visit        [employer] Information     [employer] gives you secure access to your electronic health record. If you see a primary care provider, you can also send messages to your care team and make appointments. If you have questions, please call your primary care clinic.  If you do not have a primary care provider, please call [phone] and they will assist you.        Care EveryWhere ID     This is your Care EveryWhere ID. This could be used by other organizations to access your [place] medical records  [id number]        Your Vitals Were     Pulse Temperature Last Period Pulse Oximetry BMI (Body Mass Index)       67 97.9  F (36.6  C) (Tympanic) 08/18/2013 95% 41.5 kg/m2        Blood Pressure from Last 3 Encounters:   09/10/18 132/84   08/06/18 132/84   05/19/18 135/87    Weight from Last 3 Encounters:   09/10/18 281 lb (127.5 kg)   08/06/18 281 lb (127.5 kg)   11/14/17 277 lb 3.2 oz (125.7 kg)              Today, you had the following     No orders found for display         Today's Medication Changes          These changes are accurate as of 9/10/18 10:04 AM.  If you have any questions, ask your nurse or doctor.               Start taking these medicines.        Dose/Directions    omeprazole 40 MG capsule   Commonly known as:  priLOSEC   Used for:  LPRD (laryngopharyngeal reflux disease), Globus sensation    Started by:  Delilah Zuleta PA-C        Dose:  40 mg   Take 1 capsule (40 mg) by mouth daily Take 30-60 minutes before a meal.   Quantity:  30 capsule   Refills:  1         These medicines have changed or have updated prescriptions.        Dose/Directions    * fluticasone 50 MCG/ACT spray   Commonly known as:  FLONASE   This may have changed:  Another medication with the same name was added. Make sure you understand how and when to take each.   Changed by:  Delilah Zuleta PA-C        Dose:  1 spray   Spray 1 spray into both nostrils daily   Refills:  0       * fluticasone 50 MCG/ACT spray   Commonly known as:  FLONASE   This may have changed:  You were already taking a medication with the same name, and this prescription was added. Make sure you understand how and when to take each.   Used for:  Chronic rhinitis, unspecified type   Changed by:  Delilah Zuleta PA-C        Dose:  2 spray   Spray 2 sprays into both nostrils daily   Quantity:  1 Bottle   Refills:  11       * Notice:  This list has 2 medication(s) that are the same as other medications prescribed for you. Read the directions carefully, and ask your doctor or other care provider to review them with you.         Where to get your medicines      These medications were sent to [facility] [address] [place] [address]     Phone:  [phone]     fluticasone 50 MCG/ACT spray    omeprazole 40 MG capsule                Primary Care Provider Office Phone # Fax #    Manohar Villareal [phone]       [address] [facility] [address]        Equal Access to Services     [facility] : Hadii basim maxo Sodaniel, waaxda luqadaha, qaybta kaalmada adetawanna, belgica siegel. So [facility] [phone].    ATENCIÓN: Si habla español, tiene a booker disposición servicios gratuitos de asistencia lingüística. Llame al [phone].    We comply with applicable federal civil rights laws  and [place] laws. We do not discriminate on the basis of race, color, national origin, age, disability, sex, sexual orientation, or gender identity.            Thank you!     [facility]  [facility]. Our goal is always to provide you with excellent care. Hearing back from our patients is one way we can continue to improve our services. Please take a few minutes to complete the written survey that you may receive in the mail after your visit with us. Thank you!             Your Updated Medication List - Protect others around you: Learn how to safely use, store and throw away your medicines at www.disposemymeds.org.          This list is accurate as of 9/10/18 10:04 AM.  Always use your most recent med list.                   Brand Name Dispense Instructions for use Diagnosis    aspirin 81 MG chewable tablet     90 tablet    Take 1 tablet (81 mg) by mouth daily    Atypical chest pain       * fluticasone 50 MCG/ACT spray    FLONASE     Spray 1 spray into both nostrils daily        * fluticasone 50 MCG/ACT spray    FLONASE    1 Bottle    Spray 2 sprays into both nostrils daily    Chronic rhinitis, unspecified type       ibuprofen 800 MG tablet    ADVIL/MOTRIN    40 tablet    TAKE 1 TABLET BY MOUTH EVERY 8 HOURS AS NEEDED FOR MODERATE PAIN    Post-op pain       omeprazole 40 MG capsule    priLOSEC    30 capsule    Take 1 capsule (40 mg) by mouth daily Take 30-60 minutes before a meal.    LPRD (laryngopharyngeal reflux disease), Globus sensation       valsartan-hydrochlorothiazide 80-12.5 MG per tablet    DIOVAN-HCT    90 tablet    Take 1 tablet by mouth daily    Essential hypertension with goal blood pressure less than 140/90       * Notice:  This list has 2 medication(s) that are the same as other medications prescribed for you. Read the directions carefully, and ask your doctor or other care provider to review them with you.

## 2018-09-10 NOTE — PATIENT INSTRUCTIONS
Obtain water pick for tonsil stones.   Salt water gargles for stones as well.     Start Prilosec 40 mg daily  Increase water intake  Follow LPR instructions  Complete sleep study  Continue with Claritin  Continue with Flonase 2 sprays to each nostril daily    Recheck in 4-6 weeks.     Thank you for allowing MAIRA Lugo and our ENT team to participate in your care.  If your medications are too expensive, please give the nurse a call.  We can possibly change this medication.  If you have a scheduling or an appointment question please contact [facility] Health Unit Coordinator at their direct line [phone].   ALL nursing questions or concerns can be directed to your ENT nurse at: [phone] [facility]    Adult lifestyle changes to prevent LPR reviewed      Avoid eating and drinking within two to three hours prior to bedtime    Do not drink alcohol    Eat small meals and slowly    Limit problem foods:    o Caffeine  o Carbonated drinks  o Chocolate  o Peppermint  o Tomato  o Citrus fruits  o Fatty and fried foods      Lose weight    Quit smoking    Wear loose clothing

## 2018-09-10 NOTE — PROGRESS NOTES
Otolaryngology Consultation    Patient: Paulette Flores  : 1970    Patient presents with:  Consult: Throat problems- Feels like something stuck in throat.      HPI:  Paulette Flores is a 47 year old female seen today for globus sensation, tonsil stones, and throat clearing.   She did see her PCP and was concerned for hyoid bone injury and followed recommendations but did not have a huge improvement. . She felt related to a sensation of tonsil stones.   She has sensation of sore throat, tonsils stones for 1 month. She has developed increase in tonsils stones, throat always feel sore for the last month. However, some symptoms have been present before that.   She does use salt water gargles without relief.     She does have increase throat clearing, sensation of lump while swallowing. She has felt trouble swallowing after using her toothbrush to remove stone, and developed  Deep cough and felt irritated.      She uses Claritin daily and seems to help symptoms. She has nasal congestion, post nasal drip.     Denies dysphonia. Denies cough. No fevers or weight loss. She has had hot flashes.   No concerns with reflux.   Increase in snoring, and concern for GIN. She is going to see MD for sleep study.   She does snore loudly and her breathing stopped on a few occasions.     Hx of tobacco use, quit a few years ago.   Caffeine- cup of coffee  Water- 2 bottles water.     Current Outpatient Rx   Medication Sig Dispense Refill     aspirin 81 MG chewable tablet Take 1 tablet (81 mg) by mouth daily 90 tablet 3     fluticasone (FLONASE) 50 MCG/ACT spray Spray 1 spray into both nostrils daily       ibuprofen (ADVIL/MOTRIN) 800 MG tablet TAKE 1 TABLET BY MOUTH EVERY 8 HOURS AS NEEDED FOR MODERATE PAIN 40 tablet 1     valsartan-hydrochlorothiazide (DIOVAN-HCT) 80-12.5 MG per tablet Take 1 tablet by mouth daily 90 tablet 3       Allergies: Ace inhibitors; Penicillins; Sulfa drugs; and Adhesive tape     Past Medical History:    Diagnosis Date     Abnormal glandular Papanicolaou smear of cervix 01/11/2002    also 11/05/2003     Dysmenorrhea 11/30/2006     Dysplasia of cervix, unspecified 04/25/2002     Habitual aborter, 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     Infections of genitourinary tract antepartum 05/27/2009     Metrorrhagia 12/06/2001     Migraine, unspecified, without mention of intractable migraine without mention of status migrainosus 02/23/2001    oral contraceptive related     Pain in limb 08/22/2002     Unspecified disorder of female genital organs 12/13/2006     Unspecified hemorrhage in early pregnancy, antepartum 12/04/2006       Past Surgical History:   Procedure Laterality Date     BREAST SURGERY       CHOLECYSTECTOMY       GYN SURGERY      laparoscopic supracervical hysterectomy,left tube and ovary removal,fulguration of endometriosis 8/28/13--[facility] [place]     LAPAROSCOPIC HYSTERECTOMY SUPRACERVICAL, BILATERAL SALPINGO-OOPHORECTOMY, COMBINED  8/28/2013    Procedure: COMBINED LAPAROSCOPIC HYSTERECTOMY SUPRACERVICAL, SALPINGO-OOPHORECTOMY;  LAPAROSCOPIC SUPRACERVICAL HYSTERECTOMY/LAPAROSCOPIC LEFT SALPINGO-OOPHORECTOMY;  Surgeon: Jerrell Thornton MD;  Location: [facility]     LAPAROSCOPIC SALPINGO-OOPHORECTOMY Right 11/22/2017    Procedure: LAPAROSCOPIC SALPINGO-OOPHORECTOMY;  LAPAROSCOPIC RIGHT SALPINGO-OOPHORECTOMY excision of right pelvic side wall mass;  Surgeon: Jerrell Thornton MD;  Location: [facility]     LAPAROSCOPY DIAGNOSTIC (GENERAL)       WISDOM TEETH         ENT family history reviewed    Social History   Substance Use Topics     Smoking status: Never Smoker     Smokeless tobacco: Never Used     Alcohol use No       Review of Systems  ROS: 10 point ROS neg other than the symptoms noted above in the HPI     Physical Exam  /84 (BP Location: Right arm, Patient Position: Chair, Cuff Size: Adult Large)  Pulse 67  Temp 97.9  F (36.6  C) (Tympanic)  Wt 281 lb (127.5 kg)  LMP 08/18/2013  SpO2 95%  BMI  41.5 kg/m2  General - The patient is well nourished and well developed, and appears to have good nutritional status.  Alert and oriented to person and place, answers questions and cooperates with examination appropriately.   Head and Face - Normocephalic and atraumatic, with no gross asymmetry noted.  The facial nerve is intact, with strong symmetric movements.  Voice and Breathing - The patient was breathing comfortably without the use of accessory muscles. There was no wheezing, stridor, or stertor.  The patients voice was clear and strong, and had appropriate pitch and quality.  Ears -The external auditory canals are patent, the tympanic membranes are intact without effusion, retraction or mass.  Bony landmarks are intact.  Eyes - Extraocular movements intact, and the pupils were reactive to light.  Sclera were not icteric or injected, conjunctiva were pink and moist.  Mouth - Examination of the oral cavity showed pink, healthy oral mucosa. No lesions or ulcerations noted.  The tongue was mobile and midline, and the dentition were in good condition.    Throat - The walls of the oropharynx were smooth, pink, moist, symmetric, and had no lesions or ulcerations.  The tonsillar pillars and soft palate were symmetric. Tonsils grade 2+ with crypts. The uvula was midline on elevation.  Crowded oropharynx.   Neck - Normal midline excursion of the laryngotracheal complex during swallowing.  Full range of motion on passive movement.  Palpation of the occipital, submental, submandibular, internal jugular chain, and supraclavicular nodes did not demonstrate any abnormal lymph nodes or masses.  Palpation of the thyroid was soft and smooth, with no nodules or goiter appreciated.  The trachea was mobile and midline.  Nose - External contour is symmetric, no gross deflection or scars.  Nasal mucosa is pink and moist with no abnormal mucus.  The septum and turbinates were evaluated:   No polyps, masses, or purulence noted on  examination.      Flexible Endoscopy -    Attempts at mirror laryngoscopy were not possible due to gag reflex.  Therefore I proceeded with a fiberoptic examination.  First I sprayed both sides of the nose with a mixture of lidocaine and neosynephrine.  I then passed the scope through the nasal cavity.    The nasal cavity was unremarkable.  The nasopharynx was mucosally covered and symmetric. There was adenoid tissue present.  The Eustachian tube openings were unobstructed.  Going further down I had a clear view of the base of tongue, which had normal appearing lingual tonsillar tissue.  The base of tongue was free of lesions, and the vallecula was open.  The epiglottis was smooth and mucosally covered.  The supraglottic larynx was then clearly visualized.  The vocal cords moved smoothly and symmetrically, they were slightly edematous but pearly white and no lesions were seen.  I did note a significant amount of edema and redundant mucosa in the interarytenoid soft tissues and posterior surface of the larynx.  The pyriform sinuses were open, and the limited view of the postcricoid region did not show any erosive or mass lesions.        ASSESSMENT:    ICD-10-CM    1. LPRD (laryngopharyngeal reflux disease) K21.9 omeprazole (PRILOSEC) 40 MG capsule   2. Globus sensation F45.8 omeprazole (PRILOSEC) 40 MG capsule   3. Chronic rhinitis, unspecified type J31.0 fluticasone (FLONASE) 50 MCG/ACT spray   4. Snoring R06.83    5. Daytime somnolence R40.0        In regards to her sleeping, snoring; maintain sleep study. She does have several symptoms in relation to GIN.   Discussed risks and complications of untreated GIN.     She agrees with maintaining appointment    Start Flonase 2 sprays to each nostril daily.   Continue with Claritin.   Recheck nose/ NP in 4 weeks. If congestion/ rhinitis remains, consider MQT    She does have LPR changes to larynx. Reviewed findings with her. Start Prilosec 40 mg daily for 2 months.    Follow LPR instruction    Discussed tonsils stones. Salt water gargles.   Water pick-   Discussed indications of tonsillectomy.     Adult lifestyle changes to prevent LPR reviewed      Avoid eating and drinking within two to three hours prior to bedtime    Do not drink alcohol    Eat small meals and slowly    Limit problem foods:    o Caffeine  o Carbonated drinks  o Chocolate  o Peppermint  o Tomato  o Citrus fruits  o Fatty and fried foods      Lose weight    Quit smoking    Wear loose clothing    We spent the remainder of today's visit discussing the etiology of GIN, and the fact that it is usually multifactorial.  I made it clear that a single procedure is unlikely to resolve the sleep apnea, and almost certainly CPAP will still be needed.  I find no evidence of other pathology of the upper aerodigestive tract.  And of course, I recommended weight loss as an important part of reducing the need for CPAP.  Finally I recommended a follow up visit to their sleep physician, for possible repeat sleep study and pressure titration.      Delilah Zuleta PA-C  ENT  [facility], [place]  [phone]

## 2018-09-10 NOTE — LETTER
9/10/2018         RE: Paulette Flores  [address]  [place] [address]        Dear Colleague,    Thank you for referring your patient, Paulette Flores, to the [facility] [place]. Please see a copy of my visit note below.    Otolaryngology Consultation    Patient: Paulette Flores  : 1970    Patient presents with:  Consult: Throat problems- Feels like something stuck in throat.      HPI:  Paulette Flores is a 47 year old female seen today for globus sensation, tonsil stones, and throat clearing.   She did see her PCP and was concerned for hyoid bone injury and followed recommendations but did not have a huge improvement. . She felt related to a sensation of tonsil stones.   She has sensation of sore throat, tonsils stones for 1 month. She has developed increase in tonsils stones, throat always feel sore for the last month. However, some symptoms have been present before that.   She does use salt water gargles without relief.     She does have increase throat clearing, sensation of lump while swallowing. She has felt trouble swallowing after using her toothbrush to remove stone, and developed  Deep cough and felt irritated.      She uses Claritin daily and seems to help symptoms. She has nasal congestion, post nasal drip.     Denies dysphonia. Denies cough. No fevers or weight loss. She has had hot flashes.   No concerns with reflux.   Increase in snoring, and concern for GIN. She is going to see MD for sleep study.   She does snore loudly and her breathing stopped on a few occasions.     Hx of tobacco use, quit a few years ago.   Caffeine- cup of coffee  Water- 2 bottles water.     Current Outpatient Rx   Medication Sig Dispense Refill     aspirin 81 MG chewable tablet Take 1 tablet (81 mg) by mouth daily 90 tablet 3     fluticasone (FLONASE) 50 MCG/ACT spray Spray 1 spray into both nostrils daily       ibuprofen (ADVIL/MOTRIN) 800 MG tablet TAKE 1 TABLET BY MOUTH EVERY 8 HOURS AS NEEDED FOR MODERATE PAIN  40 tablet 1     valsartan-hydrochlorothiazide (DIOVAN-HCT) 80-12.5 MG per tablet Take 1 tablet by mouth daily 90 tablet 3       Allergies: Ace inhibitors; Penicillins; Sulfa drugs; and Adhesive tape     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     Infections of genitourinary tract antepartum 05/27/2009     Metrorrhagia 12/06/2001     Migraine, unspecified, without mention of intractable migraine without mention of status migrainosus 02/23/2001    oral contraceptive related     Pain in limb 08/22/2002     Unspecified disorder of female genital organs 12/13/2006     Unspecified hemorrhage in early pregnancy, antepartum 12/04/2006       Past Surgical History:   Procedure Laterality Date     BREAST SURGERY       CHOLECYSTECTOMY       GYN SURGERY      laparoscopic supracervical hysterectomy,left tube and ovary removal,fulguration of endometriosis 8/28/13--[facility] [place]     LAPAROSCOPIC HYSTERECTOMY SUPRACERVICAL, BILATERAL SALPINGO-OOPHORECTOMY, COMBINED  8/28/2013    Procedure: COMBINED LAPAROSCOPIC HYSTERECTOMY SUPRACERVICAL, SALPINGO-OOPHORECTOMY;  LAPAROSCOPIC SUPRACERVICAL HYSTERECTOMY/LAPAROSCOPIC LEFT SALPINGO-OOPHORECTOMY;  Surgeon: Jerrell Thornton MD;  Location: [facility]     LAPAROSCOPIC SALPINGO-OOPHORECTOMY Right 11/22/2017    Procedure: LAPAROSCOPIC SALPINGO-OOPHORECTOMY;  LAPAROSCOPIC RIGHT SALPINGO-OOPHORECTOMY excision of right pelvic side wall mass;  Surgeon: Jerrell Thornton MD;  Location: [facility]     LAPAROSCOPY DIAGNOSTIC (GENERAL)       WISDOM TEETH         ENT family history reviewed    Social History   Substance Use Topics     Smoking status: Never Smoker     Smokeless tobacco: Never Used     Alcohol use No       Review of Systems  ROS: 10 point ROS neg other than the symptoms noted  above in the HPI     Physical Exam  /84 (BP Location: Right arm, Patient Position: Chair, Cuff Size: Adult Large)  Pulse 67  Temp 97.9  F (36.6  C) (Tympanic)  Wt 281 lb (127.5 kg)  LMP 08/18/2013  SpO2 95%  BMI 41.5 kg/m2  General - The patient is well nourished and well developed, and appears to have good nutritional status.  Alert and oriented to person and place, answers questions and cooperates with examination appropriately.   Head and Face - Normocephalic and atraumatic, with no gross asymmetry noted.  The facial nerve is intact, with strong symmetric movements.  Voice and Breathing - The patient was breathing comfortably without the use of accessory muscles. There was no wheezing, stridor, or stertor.  The patients voice was clear and strong, and had appropriate pitch and quality.  Ears -The external auditory canals are patent, the tympanic membranes are intact without effusion, retraction or mass.  Bony landmarks are intact.  Eyes - Extraocular movements intact, and the pupils were reactive to light.  Sclera were not icteric or injected, conjunctiva were pink and moist.  Mouth - Examination of the oral cavity showed pink, healthy oral mucosa. No lesions or ulcerations noted.  The tongue was mobile and midline, and the dentition were in good condition.    Throat - The walls of the oropharynx were smooth, pink, moist, symmetric, and had no lesions or ulcerations.  The tonsillar pillars and soft palate were symmetric. Tonsils grade 2+ with crypts. The uvula was midline on elevation.  Crowded oropharynx.   Neck - Normal midline excursion of the laryngotracheal complex during swallowing.  Full range of motion on passive movement.  Palpation of the occipital, submental, submandibular, internal jugular chain, and supraclavicular nodes did not demonstrate any abnormal lymph nodes or masses.  Palpation of the thyroid was soft and smooth, with no nodules or goiter appreciated.  The trachea was mobile and  midline.  Nose - External contour is symmetric, no gross deflection or scars.  Nasal mucosa is pink and moist with no abnormal mucus.  The septum and turbinates were evaluated:   No polyps, masses, or purulence noted on examination.      Flexible Endoscopy -    Attempts at mirror laryngoscopy were not possible due to gag reflex.  Therefore I proceeded with a fiberoptic examination.  First I sprayed both sides of the nose with a mixture of lidocaine and neosynephrine.  I then passed the scope through the nasal cavity.    The nasal cavity was unremarkable.  The nasopharynx was mucosally covered and symmetric. There was adenoid tissue present.  The Eustachian tube openings were unobstructed.  Going further down I had a clear view of the base of tongue, which had normal appearing lingual tonsillar tissue.  The base of tongue was free of lesions, and the vallecula was open.  The epiglottis was smooth and mucosally covered.  The supraglottic larynx was then clearly visualized.  The vocal cords moved smoothly and symmetrically, they were slightly edematous but pearly white and no lesions were seen.  I did note a significant amount of edema and redundant mucosa in the interarytenoid soft tissues and posterior surface of the larynx.  The pyriform sinuses were open, and the limited view of the postcricoid region did not show any erosive or mass lesions.        ASSESSMENT:    ICD-10-CM    1. LPRD (laryngopharyngeal reflux disease) K21.9 omeprazole (PRILOSEC) 40 MG capsule   2. Globus sensation F45.8 omeprazole (PRILOSEC) 40 MG capsule   3. Chronic rhinitis, unspecified type J31.0 fluticasone (FLONASE) 50 MCG/ACT spray   4. Snoring R06.83    5. Daytime somnolence R40.0        In regards to her sleeping, snoring; maintain sleep study. She does have several symptoms in relation to GIN.   Discussed risks and complications of untreated GIN.     She agrees with maintaining appointment    Start Flonase 2 sprays to each nostril daily.    Continue with Claritin.   Recheck nose/ NP in 4 weeks. If congestion/ rhinitis remains, consider MQT    She does have LPR changes to larynx. Reviewed findings with her. Start Prilosec 40 mg daily for 2 months.   Follow LPR instruction    Discussed tonsils stones. Salt water gargles.   Water pick-   Discussed indications of tonsillectomy.     Adult lifestyle changes to prevent LPR reviewed      Avoid eating and drinking within two to three hours prior to bedtime    Do not drink alcohol    Eat small meals and slowly    Limit problem foods:    o Caffeine  o Carbonated drinks  o Chocolate  o Peppermint  o Tomato  o Citrus fruits  o Fatty and fried foods      Lose weight    Quit smoking    Wear loose clothing    We spent the remainder of today's visit discussing the etiology of GIN, and the fact that it is usually multifactorial.  I made it clear that a single procedure is unlikely to resolve the sleep apnea, and almost certainly CPAP will still be needed.  I find no evidence of other pathology of the upper aerodigestive tract.  And of course, I recommended weight loss as an important part of reducing the need for CPAP.  Finally I recommended a follow up visit to their sleep physician, for possible repeat sleep study and pressure titration.      Delilah Zuleta PA-C  ENT  [facility], [place]  [phone]      Again, thank you for allowing me to participate in the care of your patient.        Sincerely,        Delilah Zuleta PA-C

## 2018-09-25 ENCOUNTER — OFFICE VISIT (OUTPATIENT)
Dept: SLEEP MEDICINE | Facility: HOSPITAL | Age: 48
End: 2018-09-25
Attending: FAMILY MEDICINE
Payer: COMMERCIAL

## 2018-09-25 VITALS
SYSTOLIC BLOOD PRESSURE: 138 MMHG | RESPIRATION RATE: 12 BRPM | HEIGHT: 69 IN | OXYGEN SATURATION: 96 % | WEIGHT: 280 LBS | BODY MASS INDEX: 41.47 KG/M2 | DIASTOLIC BLOOD PRESSURE: 88 MMHG | HEART RATE: 82 BPM

## 2018-09-25 DIAGNOSIS — G47.33 OSA (OBSTRUCTIVE SLEEP APNEA): ICD-10-CM

## 2018-09-25 DIAGNOSIS — G47.33 OSA (OBSTRUCTIVE SLEEP APNEA): Primary | ICD-10-CM

## 2018-09-25 DIAGNOSIS — R06.81 APNEA: ICD-10-CM

## 2018-09-25 DIAGNOSIS — G47.10 HYPERSOMNOLENCE: ICD-10-CM

## 2018-09-25 DIAGNOSIS — E66.01 MORBID OBESITY (H): ICD-10-CM

## 2018-09-25 DIAGNOSIS — R06.83 SNORING: ICD-10-CM

## 2018-09-25 DIAGNOSIS — I10 ESSENTIAL HYPERTENSION WITH GOAL BLOOD PRESSURE LESS THAN 140/90: Primary | ICD-10-CM

## 2018-09-25 PROCEDURE — G0399 HOME SLEEP TEST/TYPE 3 PORTA: HCPCS | Mod: 26 | Performed by: FAMILY MEDICINE

## 2018-09-25 PROCEDURE — G0399 HOME SLEEP TEST/TYPE 3 PORTA: HCPCS

## 2018-09-25 PROCEDURE — 99243 OFF/OP CNSLTJ NEW/EST LOW 30: CPT | Performed by: FAMILY MEDICINE

## 2018-09-25 PROCEDURE — G0463 HOSPITAL OUTPT CLINIC VISIT: HCPCS

## 2018-09-25 NOTE — PATIENT INSTRUCTIONS
It was a pleasure to see you today, you saw Dr. William Childress and I will be your sleep doctor going forward.

## 2018-09-25 NOTE — MR AVS SNAPSHOT
After Visit Summary   9/25/2018    Paulette Flores    MRN: [id number]           Patient Information     Date Of Birth          1970        Visit Information        Provider Department      9/25/2018 2:00 PM Agapito Childress MD [facility]        Care Instructions    It was a pleasure to see you today, you saw Dr. William Childress and I will be your sleep doctor going forward.          Follow-ups after your visit        Your next 10 appointments already scheduled     Oct 09, 2018  8:15 AM CDT   (Arrive by 8:00 AM)   Return Visit with Delilah Zuleta PA-C   [facility] [place] ([facility] [place] )    [address]  [place] [address]   [phone]              Who to contact     If you have questions or need follow up information about today's clinic visit or your schedule please contact [facility] directly at [phone].  Normal or non-critical lab and imaging results will be communicated to you by MyChart, letter or phone within 4 business days after the clinic has received the results. If you do not hear from us within 7 days, please contact the clinic through [employer] or phone. If you have a critical or abnormal lab result, we will notify you by phone as soon as possible.  Submit refill requests through [employer] or call your pharmacy and they will forward the refill request to us. Please allow 3 business days for your refill to be completed.          Additional Information About Your Visit        MyChart Information     [employer] gives you secure access to your electronic health record. If you see a primary care provider, you can also send messages to your care team and make appointments. If you have questions, please call your primary care clinic.  If you do not have a primary care provider, please call [phone] and they will assist you.        Care EveryWhere ID     This is your Care EveryWhere ID. This could be used by other organizations to access your  "[facility] medical records  [id number]        Your Vitals Were     Pulse Respirations Height Last Period Pulse Oximetry BMI (Body Mass Index)    82 12 5' 9\" (1.753 m) 08/18/2013 96% 41.35 kg/m2       Blood Pressure from Last 3 Encounters:   09/25/18 138/88   09/10/18 132/84   08/06/18 132/84    Weight from Last 3 Encounters:   09/25/18 280 lb (127 kg)   09/10/18 281 lb (127.5 kg)   08/06/18 281 lb (127.5 kg)              Today, you had the following     No orders found for display       Primary Care Provider Office Phone # Fax #    Manohar Villareal [phone]       [facility] [address]        Equal Access to Services     SUMAN TOMPKINS : Alexandrea barton Sodaniel, waaxda luqadaha, qaybta kaalmada carol annyaruiz, belgica resendez . So [facility] [phone].    ATENCIÓN: Si habla español, tiene a booker disposición servicios gratuitos de asistencia lingüística. Llame al [phone].    We comply with applicable federal civil rights laws and [place] laws. We do not discriminate on the basis of race, color, national origin, age, disability, sex, sexual orientation, or gender identity.            Thank you!     Thank you for choosing [facility]  for your care. Our goal is always to provide you with excellent care. Hearing back from our patients is one way we can continue to improve our services. Please take a few minutes to complete the written survey that you may receive in the mail after your visit with us. Thank you!             Your Updated Medication List - Protect others around you: Learn how to safely use, store and throw away your medicines at www.disposemymeds.org.          This list is accurate as of 9/25/18  2:37 PM.  Always use your most recent med list.                   Brand Name Dispense Instructions for use Diagnosis    aspirin 81 MG chewable tablet     90 tablet    Take 1 tablet (81 mg) by mouth daily    Atypical chest pain       * fluticasone 50 " MCG/ACT spray    FLONASE     Spray 1 spray into both nostrils daily        * fluticasone 50 MCG/ACT spray    FLONASE    1 Bottle    Spray 2 sprays into both nostrils daily    Chronic rhinitis, unspecified type       ibuprofen 800 MG tablet    ADVIL/MOTRIN    40 tablet    TAKE 1 TABLET BY MOUTH EVERY 8 HOURS AS NEEDED FOR MODERATE PAIN    Post-op pain       omeprazole 40 MG capsule    priLOSEC    30 capsule    Take 1 capsule (40 mg) by mouth daily Take 30-60 minutes before a meal.    LPRD (laryngopharyngeal reflux disease), Globus sensation       valsartan-hydrochlorothiazide 80-12.5 MG per tablet    DIOVAN-HCT    90 tablet    Take 1 tablet by mouth daily    Essential hypertension with goal blood pressure less than 140/90       * Notice:  This list has 2 medication(s) that are the same as other medications prescribed for you. Read the directions carefully, and ask your doctor or other care provider to review them with you.

## 2018-09-25 NOTE — NURSING NOTE
Patient ID checked with name and date of birth. Reviewed allergies and home medications. Took Vitals and brief medical history.   Scheduled for HST

## 2018-09-25 NOTE — MR AVS SNAPSHOT
After Visit Summary   9/25/2018    Paulette Flores    MRN: [id number]           Patient Information     Date Of Birth          1970        Visit Information        Provider Department      9/25/2018 3:00 PM [facility]        Today's Diagnoses     GIN (obstructive sleep apnea)    -  1       Follow-ups after your visit        Your next 10 appointments already scheduled     Oct 09, 2018  8:15 AM CDT   (Arrive by 8:00 AM)   Return Visit with Delilah Zuleta PA-C   [facility] [place] ([facility] [place] )    [address]  [place] [address]   [phone]              Who to contact     If you have questions or need follow up information about today's clinic visit or your schedule please contact [facility] directly at [phone].  Normal or non-critical lab and imaging results will be communicated to you by MyChart, letter or phone within 4 business days after the clinic has received the results. If you do not hear from us within 7 days, please contact the clinic through [employer] or phone. If you have a critical or abnormal lab result, we will notify you by phone as soon as possible.  Submit refill requests through [employer] or call your pharmacy and they will forward the refill request to us. Please allow 3 business days for your refill to be completed.          Additional Information About Your Visit        MyChart Information     [employer] gives you secure access to your electronic health record. If you see a primary care provider, you can also send messages to your care team and make appointments. If you have questions, please call your primary care clinic.  If you do not have a primary care provider, please call [phone] and they will assist you.        Care EveryWhere ID     This is your Care EveryWhere ID. This could be used by other organizations to access your [place] medical records  [id number]        Your Vitals Were     Last Period                    08/18/2013            Blood Pressure from Last 3 Encounters:   09/25/18 138/88   09/10/18 132/84   08/06/18 132/84    Weight from Last 3 Encounters:   09/25/18 280 lb (127 kg)   09/10/18 281 lb (127.5 kg)   08/06/18 281 lb (127.5 kg)              Today, you had the following     No orders found for display       Primary Care Provider Office Phone # Fax #    Manohar Villareal [phone] [address] [address]        Equal Access to Services     [facility]: Hadii basim Velarde, wamanohar calderón, qaybcheryl kaalmaruiz unger, belgica resendez . So [facility] [phone].    ATENCIÓN: Si habla español, tiene a booker disposición servicios gratuitos de asistencia lingüística. [facility] [phone].    We comply with applicable federal civil rights laws and [place] laws. We do not discriminate on the basis of race, color, national origin, age, disability, sex, sexual orientation, or gender identity.            Thank you!     Thank you for choosing [facility]  for your care. Our goal is always to provide you with excellent care. Hearing back from our patients is one way we can continue to improve our services. Please take a few minutes to complete the written survey that you may receive in the mail after your visit with us. Thank you!             Your Updated Medication List - Protect others around you: Learn how to safely use, store and throw away your medicines at www.disposemymeds.org.          This list is accurate as of 9/25/18  3:39 PM.  Always use your most recent med list.                   Brand Name Dispense Instructions for use Diagnosis    aspirin 81 MG chewable tablet     90 tablet    Take 1 tablet (81 mg) by mouth daily    Atypical chest pain       * fluticasone 50 MCG/ACT spray    FLONASE     Spray 1 spray into both nostrils daily        * fluticasone 50 MCG/ACT spray    FLONASE    1 Bottle    Spray 2 sprays into both nostrils daily     Chronic rhinitis, unspecified type       ibuprofen 800 MG tablet    ADVIL/MOTRIN    40 tablet    TAKE 1 TABLET BY MOUTH EVERY 8 HOURS AS NEEDED FOR MODERATE PAIN    Post-op pain       omeprazole 40 MG capsule    priLOSEC    30 capsule    Take 1 capsule (40 mg) by mouth daily Take 30-60 minutes before a meal.    LPRD (laryngopharyngeal reflux disease), Globus sensation       valsartan-hydrochlorothiazide 80-12.5 MG per tablet    DIOVAN-HCT    90 tablet    Take 1 tablet by mouth daily    Essential hypertension with goal blood pressure less than 140/90       * Notice:  This list has 2 medication(s) that are the same as other medications prescribed for you. Read the directions carefully, and ask your doctor or other care provider to review them with you.

## 2018-09-26 ENCOUNTER — DOCUMENTATION ONLY (OUTPATIENT)
Dept: SLEEP MEDICINE | Facility: HOSPITAL | Age: 48
End: 2018-09-26
Attending: FAMILY MEDICINE
Payer: COMMERCIAL

## 2018-09-26 DIAGNOSIS — G47.33 OSA (OBSTRUCTIVE SLEEP APNEA): Primary | ICD-10-CM

## 2018-09-26 PROCEDURE — G0399 HOME SLEEP TEST/TYPE 3 PORTA: HCPCS

## 2018-09-26 NOTE — Clinical Note
Paulette would like to try AutoPap  Instead of titration if we could get an order put in  Thank you

## 2018-09-26 NOTE — PROGRESS NOTES
This HSAT was performed using a Noxturnal T3 device which recorded snore, sound, movement activity, body position, nasal pressure, oronasal thermal airflow, pulse, oximetry and both chest and abdominal respiratory effort. HSAT data was restricted to the time patient states they were in bed.     HSAT was scored using 1B 4% hypopnea rule.     HST AHI (Non-PAT): 26.2  Snoring was reported as moderate and loud.  Time with SpO2 below 89% was 30 minutes.   Overall signal quality was good     Pt will follow up with sleep provider to determine appropriate therapy. Patient lowest SPO2 was 76%and her sleep was very restless with an awake time of almost an hour between 2:30 and 3:30.

## 2018-09-27 ENCOUNTER — DOCUMENTATION ONLY (OUTPATIENT)
Dept: SLEEP MEDICINE | Facility: CLINIC | Age: 48
End: 2018-09-27

## 2018-09-27 NOTE — PROCEDURES
"HOME SLEEP STUDY INTERPRETATION    Patient: Paulette Flores  MRN: [id number]  YOB: 1970  Study Date: 9/25/2018  Referring Provider: Manohar Villareal  Ordering Provider: Agapito Childress MD     Indications for Home Study: Paulette Flores is a 47 year old female with a history of obesity, HTN who presents with symptoms suggestive of obstructive sleep apnea.    Estimated body mass index is 41.35 kg/(m^2) as calculated from the following:    Height as of 9/25/18: 1.753 m (5' 9\").    Weight as of 9/25/18: 127 kg (280 lb).  No Data Recorded    Data: A full night home sleep study was performed recording the standard physiologic parameters including body position, movement, sound, nasal pressure, thermal oral airflow, chest and abdominal movements with respiratory inductance plethysmography, and oxygen saturation by pulse oximetry. Pulse rate was estimated by oximetry recording. This study was considered adequate based on > 4 hours of quality oximetry and respiratory recording. As specified by the AASM Manual for the Scoring of Sleep and Associated events, version 2.3, Rule VIII.D 1B, 4% oxygen desaturation scoring for hypopneas is used as a standard of care on all home sleep apnea testing.    Analysis Time:  318.2 minutes    Respiration:   Sleep Associated Hypoxemia: sustained hypoxemia was not present. Baseline oxygen saturation was 92.8%.  Time with saturation less than 90% was 20.3 minutes. The lowest oxygen saturation was 76%.   Snoring: Snoring was present.  Respiratory events: The home study revealed a presence of 31 obstructive apneas and 0 mixed and central apneas. There were 108 hypopneas resulting in a combined apnea/hypopnea index [AHI] of 26.2 events per hour.  AHI was - per hour supine, with sleep lateral.   Pattern: Excluding events noted above, respiratory rate and pattern was Normal.    Position: Percent of time spent: supine - 0.3%.    Heart Rate: By pulse oximetry normal rate was noted. "     Assessment:   Moderate to severe obstructive sleep apnea, with suspicion of very severe GIN during REM.  Sleep associated hypoxemia was present.  Potential that severity under-reported given minimal supine sleep observed.    Recommendations:  Consider auto-CPAP at 5-15 cmH2O or polysomnography with full night PAP titration.  Suggest optimizing sleep hygiene and avoiding sleep deprivation.  Weight management.    Diagnosis Code(s): Obstructive Sleep Apnea G47.33, Hypoxemia G47.36    Agapito Childress MD, MD, September 27, 2018   Diplomate, American Board of Family Medicine, Sleep Medicine

## 2018-09-28 NOTE — PROGRESS NOTES
"    Sleep Consultation:    Date on this visit: 9/25/2018    Paulette Flores is sent by Manohar Villareal for a sleep consultation regarding high probability of sleep-disordered breathing.    Primary Physician: Manohar Villareal     Chief complaint: \"I am always exhausted.\"    HPI:  Paulette Flores is a pleasant 48 yo F with history of morbid obesity, HTN who presents for a sleep evaluation for high probability of sleep-disordered breathing.  She is alone for this visit today.     I did review her most recent visit with Dr. Villareal with note of incredibly loud snoring, observed apnea, daytime fatigue, progressive weight gain.    Paulette's primary concern today is always feeling exhausted.  She feels this has largely developed over the past ~1-2 years.  Is linked with progressive weight gain, also has temporarily worsened with some anti-hypertensive medications.  She is also now ~1 year s/p surgical menopause with removal of remaining ovary.    She does report some very loud snoring, observed apnea, increasing awakenings with choking / gasping sensation.  She notes some similar sxs ~9 years ago with her last pregnancy.    Family history is unknown on father's side.  On mom's side, mentions multiple people dying in their sleep and in their 40's to 50's.    She is in bed on week days around 10pm, takes ~60 minutes to fall asleep.  Will awaken ~1x, may take a few minutes to 60+ minutes to return to sleep and if awake longer than 30 minutes, will get out of bed and do non-rewarding activity.  Up by alarm between 5-6am.  On weekends, in bed around 11pm and up around 8am.    Paulette denies any sleep walking and dream enactment.    Patient's [place] Sleepiness score 13/24 consistent with excessive  daytime sleepiness.      Allergies:    Allergies   Allergen Reactions     Ace Inhibitors Cough     Penicillins      Sulfa Drugs      SULFA (SULFONAMIDE ANTIBIOTICS)     Adhesive Tape Itching and Rash     Band aids.       Medications:    Current " Outpatient Prescriptions   Medication Sig Dispense Refill     aspirin 81 MG chewable tablet Take 1 tablet (81 mg) by mouth daily 90 tablet 3     fluticasone (FLONASE) 50 MCG/ACT spray Spray 1 spray into both nostrils daily       fluticasone (FLONASE) 50 MCG/ACT spray Spray 2 sprays into both nostrils daily 1 Bottle 11     ibuprofen (ADVIL/MOTRIN) 800 MG tablet TAKE 1 TABLET BY MOUTH EVERY 8 HOURS AS NEEDED FOR MODERATE PAIN 40 tablet 1     omeprazole (PRILOSEC) 40 MG capsule Take 1 capsule (40 mg) by mouth daily Take 30-60 minutes before a meal. 30 capsule 1     valsartan-hydrochlorothiazide (DIOVAN-HCT) 80-12.5 MG per tablet Take 1 tablet by mouth daily 90 tablet 3       Problem List:  Patient Active Problem List    Diagnosis Date Noted     Morbid obesity (H) 08/06/2018     Priority: Medium     ACP (advance care planning) 07/20/2016     Priority: Medium     Advance Care Planning 7/20/2016: ACP Review of Chart / Resources Provided:  Reviewed chart for advance care plan.  Paulette Mongeon has been provided information and resources to begin or update their advance care plan.  Added by Katia Champagne           Essential hypertension with goal blood pressure less than 140/90 07/20/2016     Priority: Medium     Migraine 02/23/2001     Priority: Medium     Problem list name updated by automated process. 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 12/13/2006     Unspecified hemorrhage in early pregnancy, antepartum 12/04/2006     Past Surgical History:   Procedure Laterality Date     BREAST SURGERY       CHOLECYSTECTOMY       GYN SURGERY      laparoscopic supracervical hysterectomy,left tube and ovary removal,fulguration of endometriosis 8/28/13--[facility] [place]     LAPAROSCOPIC HYSTERECTOMY SUPRACERVICAL, BILATERAL SALPINGO-OOPHORECTOMY, COMBINED  8/28/2013    Procedure: COMBINED LAPAROSCOPIC HYSTERECTOMY SUPRACERVICAL, SALPINGO-OOPHORECTOMY;  LAPAROSCOPIC SUPRACERVICAL HYSTERECTOMY/LAPAROSCOPIC LEFT SALPINGO-OOPHORECTOMY;  Surgeon: Jerrell Thornton MD;  Location: [facility]     LAPAROSCOPIC SALPINGO-OOPHORECTOMY Right 11/22/2017    Procedure: LAPAROSCOPIC SALPINGO-OOPHORECTOMY;  LAPAROSCOPIC RIGHT SALPINGO-OOPHORECTOMY excision of right pelvic side wall mass;  Surgeon: Jerrell Thornton MD;  Location: [facility]     LAPAROSCOPY DIAGNOSTIC (GENERAL)       WISDOM TEETH         Social History:  Social History     Social History     Marital status:      Spouse name: N/A     Number of children: N/A     Years of education: N/A     Occupational History     Not on file.     Social History Main Topics     Smoking status: Never Smoker     Smokeless tobacco: Never Used     Alcohol use No     Drug use: Not on file     Sexual activity: Not on file     Other Topics Concern     Caffeine Concern Yes     2 cups coffee/day     Exercise Yes     occasionally     Parent/Sibling W/ Cabg, Mi Or Angioplasty Before 65f 55m? No     Social History Narrative       Family History:  Family History   Problem Relation Age of Onset     Myocardial Infarction Paternal Grandfather 45     MI, cause of death     Cancer Maternal Aunt      Cervical     Hypertension Maternal Grandmother      Osteoporosis Maternal Grandmother      Myocardial Infarction Father        Review of Systems:  A complete review of systems reviewed by me is negative with the exeption of what has been mentioned in the  "history of present illness.    Physical Examination:  Vitals: /88  Pulse 82  Resp 12  Ht 1.753 m (5' 9\")  Wt 127 kg (280 lb)  LMP 08/18/2013  SpO2 96%  BMI 41.35 kg/m2  BMI= Body mass index is 41.35 kg/(m^2).    Neck Cir (cm): 42 cm    No flowsheet data found.       GENERAL APPEARANCE: healthy, alert, no distress and over weight  RESP: lungs clear to auscultation - no rales, rhonchi or wheezes  CV: regular rates and rhythm, normal S1 S2, no S3 or S4 and no murmur, click or rub  PSYCH: mentation appears normal and affect normal/bright    Impression/Plan:    Paulette Flores is a pleasant 48 yo F with history of morbid obesity, HTN who presents for a sleep evaluation for high probability of sleep-disordered breathing.    1.)  High probability of GIN with STOP-BANG score of 6.  2.)  Low clinical concern for hypoventilation.    Will schedule home sleep study to evaluate for GIN and possible treatment.  Will see patient after the study.    Plan as given to patient as above.    I have spent 60 minutes with this patient today in which greater than 50% of this time was spent in the counseling / coordination of care regarding GIN.    Polysomnography reviewed.  Obstructive sleep apnea reviewed.  Complications of untreated sleep apnea were reviewed.    Agapito Childress MD    CC: Manohar Villareal        "

## 2018-10-09 ENCOUNTER — OFFICE VISIT (OUTPATIENT)
Dept: OTOLARYNGOLOGY | Facility: OTHER | Age: 48
End: 2018-10-09
Attending: PHYSICIAN ASSISTANT
Payer: COMMERCIAL

## 2018-10-09 VITALS
TEMPERATURE: 97.5 F | DIASTOLIC BLOOD PRESSURE: 78 MMHG | OXYGEN SATURATION: 98 % | HEART RATE: 75 BPM | WEIGHT: 280 LBS | HEIGHT: 69 IN | SYSTOLIC BLOOD PRESSURE: 134 MMHG | BODY MASS INDEX: 41.47 KG/M2

## 2018-10-09 DIAGNOSIS — R06.83 SNORING: ICD-10-CM

## 2018-10-09 DIAGNOSIS — G47.33 OSA ON CPAP: ICD-10-CM

## 2018-10-09 DIAGNOSIS — K21.9 LPRD (LARYNGOPHARYNGEAL REFLUX DISEASE): ICD-10-CM

## 2018-10-09 DIAGNOSIS — R09.A2 GLOBUS SENSATION: Primary | ICD-10-CM

## 2018-10-09 PROCEDURE — 31575 DIAGNOSTIC LARYNGOSCOPY: CPT | Performed by: PHYSICIAN ASSISTANT

## 2018-10-09 PROCEDURE — 99213 OFFICE O/P EST LOW 20 MIN: CPT | Mod: 25 | Performed by: PHYSICIAN ASSISTANT

## 2018-10-09 RX ORDER — OMEPRAZOLE 40 MG/1
40 CAPSULE, DELAYED RELEASE ORAL DAILY
Qty: 30 CAPSULE | Refills: 3 | Status: SHIPPED | OUTPATIENT
Start: 2018-10-09 | End: 2019-07-24

## 2018-10-09 ASSESSMENT — PAIN SCALES - GENERAL: PAINLEVEL: MILD PAIN (3)

## 2018-10-09 NOTE — NURSING NOTE
"Chief Complaint   Patient presents with     RECHECK     Follow Up LPRD, Globus Sensation, Snoring       Initial /78 (Cuff Size: Adult Large)  Pulse 75  Temp 97.5  F (36.4  C) (Tympanic)  Ht 5' 9\" (1.753 m)  Wt 280 lb (127 kg)  LMP 08/18/2013  SpO2 98%  BMI 41.35 kg/m2 Estimated body mass index is 41.35 kg/(m^2) as calculated from the following:    Height as of this encounter: 5' 9\" (1.753 m).    Weight as of this encounter: 280 lb (127 kg).  Medication Reconciliation: complete    Patricia Morris LPN    "

## 2018-10-09 NOTE — PATIENT INSTRUCTIONS
Continue with salt water gargles.   Maintain LPR precautions.   Continue with Prilosec for 3-4 months, may then consider tapering off.   Maintain CPAP.       Thank you for allowing MAIRA Lugo and our ENT team to participate in your care.  If your medications are too expensive, please give the nurse a call.  We can possibly change this medication.  If you have a scheduling or an appointment question please contact our Health Unit Coordinator at their direct line [phone].   ALL nursing questions or concerns can be directed to your ENT nurse at: [phone] [facility]       Adult lifestyle changes to prevent LPR reviewed      Avoid eating and drinking within two to three hours prior to bedtime    Do not drink alcohol    Eat small meals and slowly    Limit problem foods:    o Caffeine  o Carbonated drinks  o Chocolate  o Peppermint  o Tomato  o Citrus fruits  o Fatty and fried foods      Lose weight

## 2018-10-09 NOTE — MR AVS SNAPSHOT
After Visit Summary   10/9/2018    Paulette Flores    MRN: [id number]           Patient Information     Date Of Birth          1970        Visit Information        Provider Department      10/9/2018 8:15 AM Delilah Zuleta PA-C [facility]        Today's Diagnoses     Globus sensation    -  1    LPRD (laryngopharyngeal reflux disease)        GIN on CPAP          Care Instructions    Continue with salt water gargles.   Maintain LPR precautions.   Continue with Prilosec for 3-4 months, may then consider tapering off.   Maintain CPAP.       Thank you for allowing MAIRA Lugo and our ENT team to participate in your care.  If your medications are too expensive, please give the nurse a call.  We can possibly change this medication.  If you have a scheduling or an appointment question please contact our Health Unit Coordinator at their direct line [phone].   ALL nursing questions or concerns can be directed to your ENT nurse at: [phone] [facility]       Adult lifestyle changes to prevent LPR reviewed      Avoid eating and drinking within two to three hours prior to bedtime    Do not drink alcohol    Eat small meals and slowly    Limit problem foods:    o Caffeine  o Carbonated drinks  o Chocolate  o Peppermint  o Tomato  o Citrus fruits  o Fatty and fried foods      Lose weight              Follow-ups after your visit        Who to contact     If you have questions or need follow up information about today's clinic visit or your schedule please contact [facility] directly at [phone].  Normal or non-critical lab and imaging results will be communicated to you by MyChart, letter or phone within 4 business days after the clinic has received the results. If you do not hear from us within 7 days, please contact the clinic through MyChart or phone. If you have a critical or abnormal lab result, we will notify you by phone as soon as possible.  Submit refill  "requests through [employer] or call your pharmacy and they will forward the refill request to us. Please allow 3 business days for your refill to be completed.          Additional Information About Your Visit        [employer] Information     [employer] gives you secure access to your electronic health record. If you see a primary care provider, you can also send messages to your care team and make appointments. If you have questions, please call your primary care clinic.  If you do not have a primary care provider, please call [phone] and they will assist you.        Care EveryWhere ID     This is your Care EveryWhere ID. This could be used by other organizations to access your [place] medical records  [id number]        Your Vitals Were     Pulse Temperature Height Last Period Pulse Oximetry BMI (Body Mass Index)    75 97.5  F (36.4  C) (Tympanic) 5' 9\" (1.753 m) 08/18/2013 98% 41.35 kg/m2       Blood Pressure from Last 3 Encounters:   10/09/18 134/78   09/25/18 138/88   09/10/18 132/84    Weight from Last 3 Encounters:   10/09/18 280 lb (127 kg)   09/25/18 280 lb (127 kg)   09/10/18 281 lb (127.5 kg)              Today, you had the following     No orders found for display         Where to get your medicines      These medications were sent to [facility] [address] [address]     Phone:  [phone]     omeprazole 40 MG capsule          Primary Care Provider Office Phone # Fax #    Manohar Villareal [phone]       [address] [place] [address]        Equal Access to Services     [facility]: Hadbianca Velarde, hilario calderón, qabelgica smith. So [facility] [phone].    ATENCIÓN: Si habla español, tiene a booker disposición servicios gratuitos de asistencia lingüística. Jessica al [phone].    We comply with applicable federal civil rights laws and [place] laws. We " do not discriminate on the basis of race, color, national origin, age, disability, sex, sexual orientation, or gender identity.            Thank you!     Thank you for choosing [facility]  for your care. Our goal is always to provide you with excellent care. Hearing back from our patients is one way we can continue to improve our services. Please take a few minutes to complete the written survey that you may receive in the mail after your visit with us. Thank you!             Your Updated Medication List - Protect others around you: Learn how to safely use, store and throw away your medicines at www.disposemymeds.org.          This list is accurate as of 10/9/18  8:26 AM.  Always use your most recent med list.                   Brand Name Dispense Instructions for use Diagnosis    aspirin 81 MG chewable tablet     90 tablet    Take 1 tablet (81 mg) by mouth daily    Atypical chest pain       fluticasone 50 MCG/ACT spray    FLONASE     Spray 1 spray into both nostrils daily        ibuprofen 800 MG tablet    ADVIL/MOTRIN    40 tablet    TAKE 1 TABLET BY MOUTH EVERY 8 HOURS AS NEEDED FOR MODERATE PAIN    Post-op pain       omeprazole 40 MG capsule    priLOSEC    30 capsule    Take 1 capsule (40 mg) by mouth daily Take 30-60 minutes before a meal.    LPRD (laryngopharyngeal reflux disease), Globus sensation       valsartan-hydrochlorothiazide 80-12.5 MG per tablet    DIOVAN-HCT    90 tablet    Take 1 tablet by mouth daily    Essential hypertension with goal blood pressure less than 140/90

## 2018-10-09 NOTE — LETTER
10/9/2018         RE: Paulette Flores  [address] [place] [address]        Dear Colleague,    Thank you for referring your patient, Paulette Flores, to the [facility] [place]. Please see a copy of my visit note below.    Chief Complaint   Patient presents with     RECHECK     Follow Up LPRD, Globus Sensation, Snoring       Patient returns to ENT for f/u exam. She was seen 9/10/18 for globus sensation and tonsil stones. She was noted to have LPR symptoms as well and was started on Prilosec for 2 months.   She does have increase throat clearing, sensation of lump while swallowing. She has felt trouble swallowing after using her toothbrush to remove stone, and developed      Paulette returns for repeat exam and has noticed that her throat has felt better. She has no concerns with swallowing. She has noticed that her throat clearing has been improved.     She has noted that her nasal symptoms are improved. She has been using Claritin and Flonase. No side effects from nasal spray. She has no PND or nasal congestion.     Paulette was scheduled for sleep study, and completed a home sleep study. She will further f/u w/ sleep Medicine MD to determine appropriate medical treatments. She has started a CPAP and used for about 5 days. She has been able to wear it for about 5 hours. She has been working on extending her use.         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     Current Outpatient Prescriptions   Medication     aspirin 81 MG chewable tablet     fluticasone (FLONASE) 50 MCG/ACT spray     ibuprofen (ADVIL/MOTRIN) 800 MG tablet     omeprazole (PRILOSEC) 40 MG capsule     valsartan-hydrochlorothiazide (DIOVAN-HCT) 80-12.5 MG per tablet     No current facility-administered medications for this visit.       ROS: 10 point ROS neg other than the symptoms noted above in the HPI.    /78 (Cuff Size: Adult Large)  Pulse 75  Temp 97.5  F (36.4  C) (Tympanic)  Ht 5' 9\" (1.753 m)  Wt 280 lb (127 kg)  LMP 08/18/2013  SpO2 98%  BMI 41.35 kg/m2    General - The patient is well nourished and well developed, and appears to have good nutritional status.  Alert and oriented to person and place, answers questions and cooperates with examination appropriately.   Head and Face - Normocephalic and atraumatic, with no gross asymmetry noted.  The facial nerve is intact, with strong symmetric movements.  Voice and Breathing - The patient was breathing comfortably without the use of accessory muscles. There was no wheezing, stridor, or stertor.  The patients voice was clear and strong, and had appropriate pitch and quality.  Ears -The external auditory canals are patent, the tympanic membranes are intact without effusion, retraction or mass.  Bony landmarks are intact.  Eyes - Extraocular movements intact, and the pupils were reactive to light.  Sclera were not icteric or injected, conjunctiva were pink and moist.  Mouth - Examination of the oral cavity showed pink, healthy oral mucosa. No lesions or ulcerations noted.  The tongue was mobile and midline, and the dentition were in good condition.    Throat - The walls of the oropharynx were " smooth, pink, moist, symmetric, and had no lesions or ulcerations.  The tonsillar pillars and soft palate were symmetric. Tonsils grade 2+ with crypts. The uvula was midline on elevation.  Crowded oropharynx.   Neck - Normal midline excursion of the laryngotracheal complex during swallowing.  Full range of motion on passive movement.  Palpation of the occipital, submental, submandibular, internal jugular chain, and supraclavicular nodes did not demonstrate any abnormal lymph nodes or masses.  Palpation of the thyroid was soft and smooth, with no nodules or goiter appreciated.  The trachea was mobile and midline.  Nose - External contour is symmetric, no gross deflection or scars.  Nasal mucosa is pink and moist with no abnormal mucus.  The septum and turbinates were evaluated:   No polyps, masses, or purulence noted on examination.      Flexible Endoscopy -     Attempts at mirror laryngoscopy were not possible due to gag reflex.  Therefore I proceeded with a fiberoptic examination.  First I sprayed both sides of the nose with a mixture of lidocaine and neosynephrine.  I then passed the scope through the nasal cavity.   The nasal cavity was unremarkable.  The nasopharynx was mucosally covered and symmetric.    NP- tissue improved, less irritation. The Eustachian tube openings were unobstructed.  Going further down I had a clear view of the base of tongue which had normal appearing lingual tonsillar tissue.  The base of tongue was free of lesions, and the vallecula was open.  The epiglottis was smooth and mucosally covered.  The supraglottic larynx was then clearly visualized.  The vocal cords moved smoothly and symmetrically, they were pearly white and no lesions were seen.  The pyriform sinuses were open, and the limited view of the postcricoid region did not show any lesions.      ASSESSMENT:    ICD-10-CM    1. Globus sensation, Improved F45.8 omeprazole (PRILOSEC) 40 MG capsule   2. LPRD (laryngopharyngeal reflux  disease) K21.9 omeprazole (PRILOSEC) 40 MG capsule   3. GIN on CPAP G47.33     Z99.89    4. Snoring R06.83        Doing well.   She will continue with prilosec for 3-4 months. Then taper off use, and use PRN    Continue w/ LPR precautions.   Flonase 2 sprays to each nostril daily.   Continue with Claritin. If congestion/ rhinitis remains, consider MQT  Continue with CPAP. GIN reviewed with patient.     Adult lifestyle changes to prevent LPR reviewed      Avoid eating and drinking within two to three hours prior to bedtime    Do not drink alcohol    Eat small meals and slowly    Limit problem foods:    o Caffeine  o Carbonated drinks  o Chocolate  o Peppermint  o Tomato  o Citrus fruits  o Fatty and fried foods      Lose weight    Quit smoking    Wear loose clothing        Delilah Zuleta PA-C  [facility], [place]  [phone]       Again, thank you for allowing me to participate in the care of your patient.        Sincerely,        Delilah Zuleta PA-C

## 2018-10-09 NOTE — PROGRESS NOTES
Chief Complaint   Patient presents with     RECHECK     Follow Up LPRD, Globus Sensation, Snoring       Patient returns to ENT for f/u exam. She was seen 9/10/18 for globus sensation and tonsil stones. She was noted to have LPR symptoms as well and was started on Prilosec for 2 months.   She does have increase throat clearing, sensation of lump while swallowing. She has felt trouble swallowing after using her toothbrush to remove stone, and developed      Paulette returns for repeat exam and has noticed that her throat has felt better. She has no concerns with swallowing. She has noticed that her throat clearing has been improved.     She has noted that her nasal symptoms are improved. She has been using Claritin and Flonase. No side effects from nasal spray. She has no PND or nasal congestion.     Paulette was scheduled for sleep study, and completed a home sleep study. She will further f/u w/ sleep Medicine MD to determine appropriate medical treatments. She has started a CPAP and used for about 5 days. She has been able to wear it for about 5 hours. She has been working on extending her use.         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  "    Current Outpatient Prescriptions   Medication     aspirin 81 MG chewable tablet     fluticasone (FLONASE) 50 MCG/ACT spray     ibuprofen (ADVIL/MOTRIN) 800 MG tablet     omeprazole (PRILOSEC) 40 MG capsule     valsartan-hydrochlorothiazide (DIOVAN-HCT) 80-12.5 MG per tablet     No current facility-administered medications for this visit.       ROS: 10 point ROS neg other than the symptoms noted above in the HPI.    /78 (Cuff Size: Adult Large)  Pulse 75  Temp 97.5  F (36.4  C) (Tympanic)  Ht 5' 9\" (1.753 m)  Wt 280 lb (127 kg)  LMP 08/18/2013  SpO2 98%  BMI 41.35 kg/m2    General - The patient is well nourished and well developed, and appears to have good nutritional status.  Alert and oriented to person and place, answers questions and cooperates with examination appropriately.   Head and Face - Normocephalic and atraumatic, with no gross asymmetry noted.  The facial nerve is intact, with strong symmetric movements.  Voice and Breathing - The patient was breathing comfortably without the use of accessory muscles. There was no wheezing, stridor, or stertor.  The patients voice was clear and strong, and had appropriate pitch and quality.  Ears -The external auditory canals are patent, the tympanic membranes are intact without effusion, retraction or mass.  Bony landmarks are intact.  Eyes - Extraocular movements intact, and the pupils were reactive to light.  Sclera were not icteric or injected, conjunctiva were pink and moist.  Mouth - Examination of the oral cavity showed pink, healthy oral mucosa. No lesions or ulcerations noted.  The tongue was mobile and midline, and the dentition were in good condition.    Throat - The walls of the oropharynx were smooth, pink, moist, symmetric, and had no lesions or ulcerations.  The tonsillar pillars and soft palate were symmetric. Tonsils grade 2+ with crypts. The uvula was midline on elevation.  Crowded oropharynx.   Neck - Normal midline excursion of the " laryngotracheal complex during swallowing.  Full range of motion on passive movement.  Palpation of the occipital, submental, submandibular, internal jugular chain, and supraclavicular nodes did not demonstrate any abnormal lymph nodes or masses.  Palpation of the thyroid was soft and smooth, with no nodules or goiter appreciated.  The trachea was mobile and midline.  Nose - External contour is symmetric, no gross deflection or scars.  Nasal mucosa is pink and moist with no abnormal mucus.  The septum and turbinates were evaluated:   No polyps, masses, or purulence noted on examination.      Flexible Endoscopy -     Attempts at mirror laryngoscopy were not possible due to gag reflex.  Therefore I proceeded with a fiberoptic examination.  First I sprayed both sides of the nose with a mixture of lidocaine and neosynephrine.  I then passed the scope through the nasal cavity.   The nasal cavity was unremarkable.  The nasopharynx was mucosally covered and symmetric.    NP- tissue improved, less irritation. The Eustachian tube openings were unobstructed.  Going further down I had a clear view of the base of tongue which had normal appearing lingual tonsillar tissue.  The base of tongue was free of lesions, and the vallecula was open.  The epiglottis was smooth and mucosally covered.  The supraglottic larynx was then clearly visualized.  The vocal cords moved smoothly and symmetrically, they were pearly white and no lesions were seen.  The pyriform sinuses were open, and the limited view of the postcricoid region did not show any lesions.      ASSESSMENT:    ICD-10-CM    1. Globus sensation, Improved F45.8 omeprazole (PRILOSEC) 40 MG capsule   2. LPRD (laryngopharyngeal reflux disease) K21.9 omeprazole (PRILOSEC) 40 MG capsule   3. GIN on CPAP G47.33     Z99.89    4. Snoring R06.83        Doing well.   She will continue with prilosec for 3-4 months. Then taper off use, and use PRN    Continue w/ LPR precautions.   Flonase  2 sprays to each nostril daily.   Continue with Claritin. If congestion/ rhinitis remains, consider MQT  Continue with CPAP. GIN reviewed with patient.     Adult lifestyle changes to prevent LPR reviewed      Avoid eating and drinking within two to three hours prior to bedtime    Do not drink alcohol    Eat small meals and slowly    Limit problem foods:    o Caffeine  o Carbonated drinks  o Chocolate  o Peppermint  o Tomato  o Citrus fruits  o Fatty and fried foods      Lose weight    Quit smoking    Wear loose clothing        Delilah Zuleta PA-C  ENT  [facility], [place]  [phone]

## 2018-10-11 ENCOUNTER — DOCUMENTATION ONLY (OUTPATIENT)
Dept: SLEEP MEDICINE | Facility: HOSPITAL | Age: 48
End: 2018-10-11

## 2018-10-12 NOTE — PROGRESS NOTES
[facility] was offered [facility]  Patient Paulette Flores was set up at [facility] on October 4, 2018. Patient received a Resmed AirSense 10 Auto. Pressures were set at 5 to 15 cm H2O.   Patient s ramp is 5 cm H2O for Auto and FLEX/EPR is EPR 2.  Patient received a Resmed Mask name: [employer] FX Jen  Pillow mask Size Small, heated tubing and heated humidifier.  Patient is not enrolled in the STM Program and does need to meet compliance. Patient has a follow up on To Be Determined with Dr. Childress.    Sushil Solares

## 2018-11-06 ENCOUNTER — TELEPHONE (OUTPATIENT)
Dept: SLEEP MEDICINE | Facility: HOSPITAL | Age: 48
End: 2018-11-06

## 2018-12-06 ENCOUNTER — OFFICE VISIT (OUTPATIENT)
Dept: CHIROPRACTIC MEDICINE | Facility: OTHER | Age: 48
End: 2018-12-06
Attending: CHIROPRACTOR
Payer: COMMERCIAL

## 2018-12-06 DIAGNOSIS — M99.03 SEGMENTAL AND SOMATIC DYSFUNCTION OF LUMBAR REGION: ICD-10-CM

## 2018-12-06 DIAGNOSIS — M99.01 SEGMENTAL AND SOMATIC DYSFUNCTION OF CERVICAL REGION: ICD-10-CM

## 2018-12-06 DIAGNOSIS — M54.50 ACUTE BILATERAL LOW BACK PAIN WITHOUT SCIATICA: ICD-10-CM

## 2018-12-06 DIAGNOSIS — M99.02 SEGMENTAL AND SOMATIC DYSFUNCTION OF THORACIC REGION: Primary | ICD-10-CM

## 2018-12-06 PROCEDURE — 98941 CHIROPRACT MANJ 3-4 REGIONS: CPT | Mod: AT | Performed by: CHIROPRACTOR

## 2018-12-10 NOTE — PROGRESS NOTES
Subjective Finding:    Chief compalint: Patient presents with:  Neck Pain  , Pain Scale: 5/10, Intensity: sharp, Duration: 1 week, Radiating: no.    Date of injury:     Activities that the pain restricts:   Home/household/hobbies/social activities: no.  Work duties: no.  Sleep: yes.  Makes symptoms better: rest.  Makes symptoms worse: activity and lumbar extension.  Have you seen anyone else for the symptoms? No.  Work related: no.  Automobile related injury: no.    Objective and Assessment:    Posture Analysis:   High shoulder: right.  Head tilt: right.  High iliac crest: right.  Head carriage: neutral.  Thoracic Kyphosis: neutral.  Lumbar Lordosis: forward.    Lumbar Range of Motion: flexion decreased and extension decreased.  Cervical Range of Motion: extension decreased.  Thoracic Range of Motion: .  Extremity Range of Motion: .    Palpation:   Quad lumb: bilateral, referred pain: no  Lev scapulae: sharp pain, referred pain: no    Segmental dysfunction pre-treatment and treatment area: C2, T1, T2, T10, T11 and L5.    Assessment post-treatment:  Cervical: ROM increased.  Thoracic: ROM increased.  Lumbar: ROM increased and pain and tenderness decreased.    Comments: .      Complicating Factors: .    Plan / Procedure:    Treatment plan: PRN.  Instructed patient: stretch as instructed at visit and walk 10 minutes.  Short term goals: reduce pain.  Long term goals: restore normal function.  Prognosis: excellent.                    cdiff sample sent. rounded/assisted to bedside commode

## 2018-12-11 ENCOUNTER — OFFICE VISIT (OUTPATIENT)
Dept: SLEEP MEDICINE | Facility: HOSPITAL | Age: 48
End: 2018-12-11
Attending: FAMILY MEDICINE
Payer: COMMERCIAL

## 2018-12-11 VITALS
HEIGHT: 69 IN | SYSTOLIC BLOOD PRESSURE: 130 MMHG | OXYGEN SATURATION: 97 % | BODY MASS INDEX: 39.99 KG/M2 | RESPIRATION RATE: 12 BRPM | WEIGHT: 270 LBS | DIASTOLIC BLOOD PRESSURE: 92 MMHG | HEART RATE: 60 BPM

## 2018-12-11 DIAGNOSIS — G47.33 OSA (OBSTRUCTIVE SLEEP APNEA): Primary | ICD-10-CM

## 2018-12-11 PROCEDURE — 99212 OFFICE O/P EST SF 10 MIN: CPT | Performed by: FAMILY MEDICINE

## 2018-12-11 PROCEDURE — G0463 HOSPITAL OUTPT CLINIC VISIT: HCPCS

## 2018-12-11 ASSESSMENT — MIFFLIN-ST. JEOR: SCORE: 1924.09

## 2018-12-11 NOTE — PROGRESS NOTES
Sleep Follow-Up Visit:    Date on this visit: 2018    Paulette Flores comes in today for CPAP compliance follow-up of moderate to severe GIN with sleep-associated hypoxemia treated with CPAP.    Pertinent PMHx of obesity, HTN.    She has had a mixed experience with starting CPAP.  When she is able to wear it for majority of the night, she does feel much better during the day.  Barriers to use of CPAP have been feeling of sore throat that improves when not using her CPAP.  Denies oral dryness.  Some prior issues with chronic sore throat.    CPAP download from 2018 - 2018 on auto-titrate CPAP 5-15 cm H2O.  Used on  days.  Average daily usage of 4 hours 26 minutes, used >= 4 hours on 60% of nights.  Pressure median 7.2 cm H2O, 95th%ile of 9.8 cm H2O.  AHI 0.2.    Prior Sleep Testin2018 - HST with weight 280 lbs, AHI 26.2, baseline SpO2 92.8%, marisabel SpO2 76%, time of SpO2 <= 89% of 20.3 minutes.  Suspicion of very severe GIN during REM.    Problem List:  Patient Active Problem List    Diagnosis Date Noted     Morbid obesity (H) 2018     Priority: Medium     ACP (advance care planning) 2016     Priority: Medium     Advance Care Planning 2016: ACP Review of Chart / Resources Provided:  Reviewed chart for advance care plan.  Paulette Flores has been provided information and resources to begin or update their advance care plan.  Added by Katia Champagne           Essential hypertension with goal blood pressure less than 140/90 2016     Priority: Medium     Migraine 2001     Priority: Medium     Problem list name updated by automated process. Provider to review          Impression/Plan:    1.)  Moderate to severe GIN with sleep-associated hypoxemia   - Appears controlled with CPAP, but report of chronic sore throat without oral dryness.   - Will try decreasing upper pressure limit to 7.4 cm H2O (auto 5 - 7.4) and see if improvement in sore throat and monitor if AHI <  5.    She will follow up with me in about 1 month(s).     Twenty-five minutes spent with patient, all of which were spent face-to-face counseling, consulting, coordinating plan of care.      Agapito Childress MD    CC: Manohar Villareal

## 2019-02-28 ENCOUNTER — TELEPHONE (OUTPATIENT)
Dept: FAMILY MEDICINE | Facility: OTHER | Age: 49
End: 2019-02-28

## 2019-02-28 ENCOUNTER — OFFICE VISIT (OUTPATIENT)
Dept: FAMILY MEDICINE | Facility: OTHER | Age: 49
End: 2019-02-28
Attending: FAMILY MEDICINE
Payer: COMMERCIAL

## 2019-02-28 VITALS
SYSTOLIC BLOOD PRESSURE: 120 MMHG | HEIGHT: 69 IN | TEMPERATURE: 98.1 F | DIASTOLIC BLOOD PRESSURE: 74 MMHG | HEART RATE: 61 BPM | WEIGHT: 274 LBS | OXYGEN SATURATION: 97 % | BODY MASS INDEX: 40.58 KG/M2

## 2019-02-28 DIAGNOSIS — S86.812A STRAIN OF CALF MUSCLE, LEFT, INITIAL ENCOUNTER: Primary | ICD-10-CM

## 2019-02-28 PROCEDURE — 99213 OFFICE O/P EST LOW 20 MIN: CPT | Performed by: FAMILY MEDICINE

## 2019-02-28 ASSESSMENT — MIFFLIN-ST. JEOR: SCORE: 1942.24

## 2019-02-28 ASSESSMENT — PAIN SCALES - GENERAL: PAINLEVEL: EXTREME PAIN (8)

## 2019-02-28 NOTE — NURSING NOTE
"Chief Complaint   Patient presents with     Knee Pain       Initial /74   Pulse 61   Temp 98.1  F (36.7  C)   Ht 1.753 m (5' 9\")   Wt 124.3 kg (274 lb)   LMP 08/18/2013   SpO2 97%   BMI 40.46 kg/m   Estimated body mass index is 40.46 kg/m  as calculated from the following:    Height as of this encounter: 1.753 m (5' 9\").    Weight as of this encounter: 124.3 kg (274 lb).  Medication Reconciliation: complete    Wilber Resendez LPN  "

## 2019-02-28 NOTE — PROGRESS NOTES
"  SUBJECTIVE:   Paulette Flores is a 48 year old female who presents to clinic today for the following health issues:      Musculoskeletal problem/pain      Duration: injured knee 4 weeks ago and noticed lump in calf yesterday    Description  Location: left upper calf    Intensity:  moderate    Accompanying signs and symptoms: none    History  Previous similar problem: no   Previous evaluation:  none    Precipitating or alleviating factors:  Trauma or overuse: YES- injured left knee 4 weeks ago  Aggravating factors include: sitting    Therapies tried and outcome: nothing      After fell - had large medial knee hematoma- it was large swollen and had some heat-- slowly went away. Now feels small lump in back of calf-- mild tenderness. Does not stop her from doing things. Has some pain at times.     Problem list and histories reviewed & adjusted, as indicated.  Additional history: as documented    Labs reviewed in EPIC    Reviewed and updated as needed this visit by clinical staff  Tobacco  Allergies  Meds  Med Hx  Surg Hx  Fam Hx  Soc Hx      Reviewed and updated as needed this visit by Provider         ROS:  CONSTITUTIONAL: NEGATIVE for fever, chills, change in weight  CV: NEGATIVE for chest pain, palpitations or peripheral edema    OBJECTIVE:                                                    /74   Pulse 61   Temp 98.1  F (36.7  C)   Ht 1.753 m (5' 9\")   Wt 124.3 kg (274 lb)   LMP 08/18/2013   SpO2 97%   BMI 40.46 kg/m    Body mass index is 40.46 kg/m .   GENERAL: healthy, alert, well nourished, well hydrated, no distress  MS: extremities- left knee - normal , left calf no gross deformities noted, no edema, mild calf tenderness. No swelling and negative Homans.  Small lump probable small inclusion cyst         ASSESSMENT/PLAN:                                                    (J08.199U) Strain of calf muscle, left, initial encounter  (primary encounter diagnosis)  Comment: very mild - no s//s of " blood clot or other major issues  Plan: Discussed in length conservative measures of OTC medications for pain, Ice/Heat, elevation and the concept of R.I.C.E.. Continue behavioral changes and proper body mechanics to allow for healing. Follow up as directed.   Symptomatic treatment was discussed along when patient should call and/or come back into the clinic or go to ER/Urgent care. All questions answered.           Manohar Villareal MD  [facility]

## 2019-02-28 NOTE — TELEPHONE ENCOUNTER
8:32 AM    Reason for Call: OVERBOOK    Patient is having the following symptoms: Pt said she had a skiing injury about a month ago- Lt knee (behind the knee / calf) for 1 months.  Pt is nervous / concerned about it maybe being a blood clot.  Pt stated she has a lump behind her knee.     The patient is requesting an appointment for Today or Friday with Dr. Villareal.    Was an appointment offered for this call? No  If yes : Appointment type              Date    Preferred method for responding to this message: Telephone Call  What is your phone number ?[phone]    If we cannot reach you directly, may we leave a detailed response at the number you provided? Yes    Can this message wait until your PCP/provider returns, if unavailable today? Not applicable, PCP is in this morning.    Bell Flores

## 2019-03-09 DIAGNOSIS — I10 ESSENTIAL HYPERTENSION WITH GOAL BLOOD PRESSURE LESS THAN 140/90: ICD-10-CM

## 2019-03-11 RX ORDER — VALSARTAN AND HYDROCHLOROTHIAZIDE 80; 12.5 MG/1; MG/1
TABLET, FILM COATED ORAL
Qty: 90 TABLET | Refills: 0 | Status: SHIPPED | OUTPATIENT
Start: 2019-03-11 | End: 2019-06-11

## 2019-03-19 ENCOUNTER — OFFICE VISIT (OUTPATIENT)
Dept: OBGYN | Facility: OTHER | Age: 49
End: 2019-03-19
Attending: NURSE PRACTITIONER
Payer: COMMERCIAL

## 2019-03-19 VITALS
SYSTOLIC BLOOD PRESSURE: 122 MMHG | HEART RATE: 76 BPM | WEIGHT: 274 LBS | DIASTOLIC BLOOD PRESSURE: 76 MMHG | BODY MASS INDEX: 48.55 KG/M2 | HEIGHT: 63 IN | OXYGEN SATURATION: 97 %

## 2019-03-19 DIAGNOSIS — Z12.4 SCREENING FOR CERVICAL CANCER: ICD-10-CM

## 2019-03-19 DIAGNOSIS — E66.812 CLASS 2 SEVERE OBESITY DUE TO EXCESS CALORIES WITH SERIOUS COMORBIDITY IN ADULT, UNSPECIFIED BMI (H): ICD-10-CM

## 2019-03-19 DIAGNOSIS — Z12.31 ENCOUNTER FOR SCREENING MAMMOGRAM FOR BREAST CANCER: Primary | ICD-10-CM

## 2019-03-19 DIAGNOSIS — E66.01 CLASS 2 SEVERE OBESITY DUE TO EXCESS CALORIES WITH SERIOUS COMORBIDITY IN ADULT, UNSPECIFIED BMI (H): ICD-10-CM

## 2019-03-19 DIAGNOSIS — Z01.419 ENCOUNTER FOR GYNECOLOGICAL EXAMINATION WITHOUT ABNORMAL FINDING: ICD-10-CM

## 2019-03-19 PROCEDURE — 99000 SPECIMEN HANDLING OFFICE-LAB: CPT | Performed by: NURSE PRACTITIONER

## 2019-03-19 PROCEDURE — 87624 HPV HI-RISK TYP POOLED RSLT: CPT | Mod: 90 | Performed by: NURSE PRACTITIONER

## 2019-03-19 PROCEDURE — 99213 OFFICE O/P EST LOW 20 MIN: CPT | Performed by: NURSE PRACTITIONER

## 2019-03-19 PROCEDURE — 88142 CYTOPATH C/V THIN LAYER: CPT | Performed by: NURSE PRACTITIONER

## 2019-03-19 ASSESSMENT — MIFFLIN-ST. JEOR: SCORE: 1846.99

## 2019-03-19 ASSESSMENT — PAIN SCALES - GENERAL: PAINLEVEL: MODERATE PAIN (4)

## 2019-03-19 NOTE — LETTER
[facility] [place]  [address]  [place] [address]  [phone]        May 6, 2019    Paulette Flores  [address] [place] [address]              Dear Paulette Flores    This is to remind you that your fasting lab is due.    You may call our office at [phone] to schedule an appointment.    Please disregard this notice if you have already had your labs drawn or made an appointment.        Sincerely,        Kathryn Mckeon NP

## 2019-03-19 NOTE — NURSING NOTE
"Chief Complaint   Patient presents with     Gyn Exam       Initial /76 (BP Location: Right arm, Patient Position: Sitting, Cuff Size: Adult Large)   Pulse 76   Ht 1.6 m (5' 3\")   Wt 124.3 kg (274 lb)   LMP 08/18/2013   SpO2 97%   BMI 48.54 kg/m   Estimated body mass index is 48.54 kg/m  as calculated from the following:    Height as of this encounter: 1.6 m (5' 3\").    Weight as of this encounter: 124.3 kg (274 lb).  Medication Reconciliation: complete    Chel Fontenot LPN    "

## 2019-03-21 NOTE — PATIENT INSTRUCTIONS
Patient Education     Breast Health: Breast Self-Awareness  What is breast self-awareness?  Breast self-awareness is knowing how your breasts normally look and feel. Your breasts change as you go through different stages of your life. So it s important to learn what is normal for your breasts. Breast self-awareness helps you notice any changes in your breasts right away. Report any changes to your healthcare provider.  Why is breast self-awareness important?  Many experts now say that women should focus on breast self-awareness instead of doing a breast self-examination (BSE). These experts include the American Cancer Society, the U.S. Preventive Services Task Force, and the American Congress of Obstetricians and Gynecologists. Some experts even advise not teaching women to do a BSE. That s because research hasn t shown a clear benefit to doing BSEs.  Breast self-awareness is different than a BSE. Breast self-awareness isn t about following a certain method and schedule. It s about knowing what's normal for your breasts. That way you can notice even small changes right away. If you see any changes, report them to your healthcare provider.  Changes to look for  Call your healthcare provider if you find any changes in your breasts that concern you. These changes may include:    A lump    Nipple discharge other than breastmilk, especially a bloody discharge    Swelling    A change in size or shape    Skin irritation, such as redness, thickening, or dimpling of the skin    Swollen lymph nodes in the armpit    Nipple problems, such as pain or redness  If you find a lump  Contact your provider if you find lumpiness in one breast, feel something different in the tissue, or feel a definite lump. Sometimes lumpiness may be due to menstrual changes. But there may be reason for concern.  Your provider may want to see you right away if you have:    Nipple discharge that is bloody    Skin changes on your breast, such as  dimpling or puckering  It s normal to be upset if you find a lump. But it s important to contact your provider right away. Remember that most breast lumps are benign. This means they are not cancer.  Date Last Reviewed: 8/1/2017 2000-2018 [employer]. [address] [place] [address], [place] [address]. All rights reserved. This information is not intended as a substitute for professional medical care. Always follow your healthcare professional's instructions.

## 2019-03-21 NOTE — PROGRESS NOTES
CC:  Annual gyn exam  HPI:  Paulette Flores is a 48 year old female P2 Patient's last menstrual period was 08/18/2013. s\p LSH. She would like to have her cholesterol checked.  This will be ordered as a future lab. She is up t date with her PCP.  No other c/o.      Past GYN history:  No STD history  STI testing offered?  Declined       Last PAP smear:  ASCUS  Mammograms up to date: no (patient will schedule)      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     Infections of genitourinary tract antepartum 05/27/2009     Metrorrhagia 12/06/2001     Migraine, unspecified, without mention of intractable migraine without mention of status migrainosus 02/23/2001    oral contraceptive related     Pain in limb 08/22/2002     Unspecified disorder of female genital organs 12/13/2006     Unspecified hemorrhage in early pregnancy, antepartum 12/04/2006       Past Surgical History:   Procedure Laterality Date     BREAST SURGERY       CHOLECYSTECTOMY       GYN SURGERY      laparoscopic supracervical hysterectomy,left tube and ovary removal,fulguration of endometriosis 8/28/13--[facility] [place]     LAPAROSCOPIC HYSTERECTOMY SUPRACERVICAL, BILATERAL SALPINGO-OOPHORECTOMY, COMBINED  8/28/2013    Procedure: COMBINED LAPAROSCOPIC HYSTERECTOMY SUPRACERVICAL, SALPINGO-OOPHORECTOMY;  LAPAROSCOPIC SUPRACERVICAL HYSTERECTOMY/LAPAROSCOPIC LEFT SALPINGO-OOPHORECTOMY;  Surgeon: Jerrell Thornton MD;  Location: [facility]     LAPAROSCOPIC SALPINGO-OOPHORECTOMY Right 11/22/2017    Procedure: LAPAROSCOPIC SALPINGO-OOPHORECTOMY;  LAPAROSCOPIC RIGHT SALPINGO-OOPHORECTOMY excision of right pelvic side wall mass;  Surgeon: Jerrell Thornton MD;  Location: [facility]     LAPAROSCOPY DIAGNOSTIC (GENERAL)       WISDOM TEETH         Family History  "  Problem Relation Age of Onset     Myocardial Infarction Paternal Grandfather 45        MI, cause of death     Cancer Maternal Aunt         Cervical     Hypertension Maternal Grandmother      Osteoporosis Maternal Grandmother      Myocardial Infarction Father        Current Outpatient Medications   Medication Sig Dispense Refill     aspirin 81 MG chewable tablet Take 1 tablet (81 mg) by mouth daily 90 tablet 3     fluticasone (FLONASE) 50 MCG/ACT spray Spray 1 spray into both nostrils daily       omeprazole (PRILOSEC) 40 MG capsule Take 1 capsule (40 mg) by mouth daily Take 30-60 minutes before a meal. 30 capsule 3     valsartan-hydrochlorothiazide (DIOVAN HCT) 80-12.5 MG tablet TAKE 1 TABLET BY MOUTH DAILY 90 tablet 0       Allergies: Ace inhibitors; Penicillins; Sulfa drugs; and Adhesive tape    ROS:  CONSTITUTIONAL:NEGATIVE for fever, chills, change in weight  BREAST: NEGATIVE for masses, tenderness or discharge  : negative for, dysparunia, incontinence, vaginal discharge and bleeding    EXAM:  Blood pressure 122/76, pulse 76, height 1.6 m (5' 3\"), weight 124.3 kg (274 lb), last menstrual period 08/18/2013, SpO2 97 %, not currently breastfeeding.   BMI= Body mass index is 48.54 kg/m .  General - pleasant female in no acute distress.  Breast - no nodularity, asymmetry or nipple discharge bilaterally.  Abdomen - soft, nontender, nondistended, no hepatosplenomegaly.  Pelvic - EG: normal adult female, BUS: within normal limits, Vagina: well rugated, no discharge, Cervix: no lesions or CMT, Adnexae: no masses or tenderness.  Rectovaginal - deferred.  Musculoskeletal - no gross deformities.  Neurological - normal strength, sensation, and mental status.      ASSESSMENT/PLAN:  (Z12.31) Encounter for screening mammogram for breast cancer  (primary encounter diagnosis)  Comment:   Plan: MA SCREENING DIGITAL BILATERAL ([place])            (Z12.4) Screening for cervical cancer  Comment:   Plan: A pap thin layer screen " with  HPV - recommended        age 30 - 65 years (select HPV order below), HPV        High Risk Types DNA Cervical            (E66.9) Obesity  Comment:   Plan: Lipid Profile (Chol, Trig, HDL, LDL calc)            (Z01.419) Encounter for gynecological examination without abnormal finding  Comment:   Plan: return annually      Discussed exercise and healthy eating, including calcium intake.  She should return to the clinic in one year, or sooner if problems arise.

## 2019-03-26 LAB
COPATH REPORT: NORMAL
PAP: NORMAL

## 2019-03-27 LAB
FINAL DIAGNOSIS: NORMAL
HPV HR 12 DNA CVX QL NAA+PROBE: NEGATIVE
HPV16 DNA SPEC QL NAA+PROBE: NEGATIVE
HPV18 DNA SPEC QL NAA+PROBE: NEGATIVE
SPECIMEN DESCRIPTION: NORMAL
SPECIMEN SOURCE CVX/VAG CYTO: NORMAL

## 2019-04-16 ENCOUNTER — ANCILLARY PROCEDURE (OUTPATIENT)
Dept: MAMMOGRAPHY | Facility: OTHER | Age: 49
End: 2019-04-16
Attending: NURSE PRACTITIONER
Payer: COMMERCIAL

## 2019-04-16 DIAGNOSIS — Z12.31 ENCOUNTER FOR SCREENING MAMMOGRAM FOR BREAST CANCER: ICD-10-CM

## 2019-04-16 PROCEDURE — 77067 SCR MAMMO BI INCL CAD: CPT | Mod: TC

## 2019-04-16 PROCEDURE — 77063 BREAST TOMOSYNTHESIS BI: CPT | Mod: TC

## 2019-05-06 ENCOUNTER — OFFICE VISIT (OUTPATIENT)
Dept: CHIROPRACTIC MEDICINE | Facility: OTHER | Age: 49
End: 2019-05-06
Attending: CHIROPRACTOR
Payer: COMMERCIAL

## 2019-05-06 DIAGNOSIS — M99.02 SEGMENTAL AND SOMATIC DYSFUNCTION OF THORACIC REGION: ICD-10-CM

## 2019-05-06 DIAGNOSIS — M99.01 SEGMENTAL AND SOMATIC DYSFUNCTION OF CERVICAL REGION: Primary | ICD-10-CM

## 2019-05-06 DIAGNOSIS — M54.2 CERVICALGIA: ICD-10-CM

## 2019-05-06 PROCEDURE — 98940 CHIROPRACT MANJ 1-2 REGIONS: CPT | Mod: AT | Performed by: CHIROPRACTOR

## 2019-05-06 NOTE — PROGRESS NOTES
Subjective Finding:    Chief compalint: Patient presents with:  Neck Pain  , Pain Scale: 5/10, Intensity: sharp, Duration: 1 week, Radiating: no.    Date of injury:     Activities that the pain restricts:   Home/household/hobbies/social activities: no.  Work duties: no.  Sleep: yes.  Makes symptoms better: rest.  Makes symptoms worse: activity and lumbar extension.  Have you seen anyone else for the symptoms? No.  Work related: no.  Automobile related injury: no.    Objective and Assessment:    Posture Analysis:   High shoulder: right.  Head tilt: right.  High iliac crest: right.  Head carriage: neutral.  Thoracic Kyphosis: neutral.  Lumbar Lordosis: forward.    Lumbar Range of Motion: flexion decreased and extension decreased.  Cervical Range of Motion: extension decreased.  Thoracic Range of Motion: .  Extremity Range of Motion: .    Palpation:   CT jxn     Segmental dysfunction pre-treatment and treatment area: C56  T4.    Assessment post-treatment:  Cervical: ROM increased.  Thoracic: ROM increased.  Lumbar: ROM increased and pain and tenderness decreased.    Comments: .      Complicating Factors: .    Plan / Procedure:    Treatment plan: PRN.  Instructed patient: stretch as instructed at visit and walk 10 minutes.  Short term goals: reduce pain.  Long term goals: restore normal function.  Prognosis: excellent.

## 2019-06-11 DIAGNOSIS — I10 ESSENTIAL HYPERTENSION WITH GOAL BLOOD PRESSURE LESS THAN 140/90: ICD-10-CM

## 2019-06-11 NOTE — LETTER
June 12, 2019      Paulette Flores  [address]  [place] [address]        Dear Paulette,     We are concerned about your health care.  We recently provided you with medication refills.  Orders have been placed in our computer and should be accessible at most [facility] labs. You WILL NOT need to be fasting for this lab. Please complete this as soon as possible. If you have any questions please call us at [phone].        Sincerely,        Manohar Villareal MD

## 2019-06-11 NOTE — TELEPHONE ENCOUNTER
valsartan-hydrochlorothiazide (DIOVAN HCT) 80-12.5 MG tablet  Last Written Prescription Date:  3/11/19  Last Fill Quantity: 90,   # refills: 0  Last Office Visit: 2/28/19  Future Office visit:

## 2019-06-12 RX ORDER — VALSARTAN AND HYDROCHLOROTHIAZIDE 80; 12.5 MG/1; MG/1
TABLET, FILM COATED ORAL
Qty: 60 TABLET | Refills: 0 | Status: SHIPPED | OUTPATIENT
Start: 2019-06-12 | End: 2019-06-18

## 2019-06-17 NOTE — TELEPHONE ENCOUNTER
Pt called requesting 90 day supply of med. Nurse previously gave 60 day supply, pt is due for lab work and a yearly f/u. Transferred to scheduling, pt verbalizes understanding.

## 2019-06-18 ENCOUNTER — TELEPHONE (OUTPATIENT)
Dept: FAMILY MEDICINE | Facility: OTHER | Age: 49
End: 2019-06-18

## 2019-06-18 DIAGNOSIS — I10 ESSENTIAL HYPERTENSION WITH GOAL BLOOD PRESSURE LESS THAN 140/90: ICD-10-CM

## 2019-06-18 RX ORDER — VALSARTAN AND HYDROCHLOROTHIAZIDE 80; 12.5 MG/1; MG/1
1 TABLET, FILM COATED ORAL DAILY
Qty: 90 TABLET | Refills: 3 | Status: SHIPPED | OUTPATIENT
Start: 2019-06-18 | End: 2020-02-24

## 2019-06-18 NOTE — TELEPHONE ENCOUNTER
10:59 AM    Reason for Call: Phone Call    Description: patient states she needs refill of valsartan. Patients insurance will cover 90 day supply. Patient states she was told if she mad an appointment it would get sent in. Patient has appointment scheduled to be seen for July 1st. Please advise and call patient back     Was an appointment offered for this call? No  If yes : Appointment type              Date    Preferred method for responding to this message: Telephone Call  What is your phone number ?[phone]  If we cannot reach you directly, may we leave a detailed response at the number you provided? Yes    Can this message wait until your PCP/provider returns, if available today? Not applicable    Neli Fritz LPN

## 2019-06-24 NOTE — PROGRESS NOTES
"Subjective     Paulette Flores is a 48 year old female who presents to clinic today for the following health issues:    HPI   Hypertension Follow-up      Do you check your blood pressure regularly outside of the clinic? Yes     Are you following a low salt diet? Yes    Are your blood pressures ever more than 140 on the top number (systolic) OR more   than 90 on the bottom number (diastolic), for example 140/90? No    Amount of exercise or physical activity: 6-7 days/week for an average of 15-30 minutes    Problems taking medications regularly: No    Medication side effects: fatigue , taking at night    Diet: low salt                Reviewed and updated as needed this visit by Provider         Review of Systems   ROS COMP: Constitutional, HEENT, cardiovascular, pulmonary, gi and gu systems are negative, except as otherwise noted.      Objective    /78   Pulse 72   Temp 98.4  F (36.9  C)   Ht 1.753 m (5' 9\")   Wt 114.3 kg (252 lb)   LMP 08/18/2013   SpO2 96%   BMI 37.21 kg/m    LMP 08/18/2013   Body mass index is 37.21 kg/m .  Physical Exam   GENERAL: healthy, alert and no distress  EYES: Eyes grossly normal to inspection, PERRL and conjunctivae and sclerae normal  HENT: ear canals and TM's normal, nose and mouth without ulcers or lesions  NECK: no adenopathy, no asymmetry, masses, or scars and thyroid normal to palpation  RESP: lungs clear to auscultation - no rales, rhonchi or wheezes  CV: regular rate and rhythm, normal S1 S2, no S3 or S4, no murmur, click or rub, no peripheral edema and peripheral pulses strong  ABDOMEN: soft, nontender, no hepatosplenomegaly, no masses and bowel sounds normal  MS: no gross musculoskeletal defects noted, no edema  PSYCH: mentation appears normal, affect normal/bright    Results for orders placed or performed in visit on 07/01/19   Lipid Profile   Result Value Ref Range    Cholesterol 193 <200 mg/dL    Triglycerides 160 (H) <150 mg/dL    HDL Cholesterol 35 (L) >49 mg/dL "    LDL Cholesterol Calculated 126 (H) <100 mg/dL    Non HDL Cholesterol 158 (H) <130 mg/dL   CBC with platelets differential   Result Value Ref Range    WBC 6.6 4.0 - 11.0 10e9/L    RBC Count 4.61 3.8 - 5.2 10e12/L    Hemoglobin 13.5 11.7 - 15.7 g/dL    Hematocrit 40.8 35.0 - 47.0 %    MCV 89 78 - 100 fl    MCH 29.3 26.5 - 33.0 pg    MCHC 33.1 31.5 - 36.5 g/dL    RDW 13.9 10.0 - 15.0 %    Platelet Count 203 150 - 450 10e9/L    Diff Method Automated Method     % Neutrophils 63.7 %    % Lymphocytes 20.6 %    % Monocytes 9.5 %    % Eosinophils 5.3 %    % Basophils 0.6 %    % Immature Granulocytes 0.3 %    Nucleated RBCs 0 0 /100    Absolute Neutrophil 4.2 1.6 - 8.3 10e9/L    Absolute Lymphocytes 1.4 0.8 - 5.3 10e9/L    Absolute Monocytes 0.6 0.0 - 1.3 10e9/L    Absolute Eosinophils 0.4 0.0 - 0.7 10e9/L    Absolute Basophils 0.0 0.0 - 0.2 10e9/L    Abs Immature Granulocytes 0.0 0 - 0.4 10e9/L    Absolute Nucleated RBC 0.0    Comprehensive metabolic panel   Result Value Ref Range    Sodium 139 133 - 144 mmol/L    Potassium 3.8 3.4 - 5.3 mmol/L    Chloride 107 94 - 109 mmol/L    Carbon Dioxide 27 20 - 32 mmol/L    Anion Gap 5 3 - 14 mmol/L    Glucose 108 (H) 70 - 99 mg/dL    Urea Nitrogen 10 7 - 30 mg/dL    Creatinine 0.82 0.52 - 1.04 mg/dL    GFR Estimate 85 >60 mL/min/[1.73_m2]    GFR Estimate If Black >90 >60 mL/min/[1.73_m2]    Calcium 9.1 8.5 - 10.1 mg/dL    Bilirubin Total 0.4 0.2 - 1.3 mg/dL    Albumin 3.8 3.4 - 5.0 g/dL    Protein Total 7.3 6.8 - 8.8 g/dL    Alkaline Phosphatase 74 40 - 150 U/L    ALT 45 0 - 50 U/L    AST 19 0 - 45 U/L             Assessment & Plan     1. Essential hypertension with goal blood pressure less than 140/90  Stable on meds. Labs ok . See in a year.  RF done earlier   - Comprehensive metabolic panel; Future  - CBC with platelets differential; Future  - Lipid Profile; Future    2. Mixed hyperlipidemia  Very mild BUT has risk.  Low HDL. Discussed in length.  Would recommend low dose  "statin. R/B discussed.  Pt is to think about. It.   High omega 3 diet or supplements discussed.     3. FH: CAD (coronary artery disease)  Discussed. Discussed new ASA study. All questions answered.     4. Morbid obesity (H)  Discussed behavioral changes to improve obesity including increase diet, decreasing carbs .         BMI:   Estimated body mass index is 37.21 kg/m  as calculated from the following:    Height as of this encounter: 1.753 m (5' 9\").    Weight as of this encounter: 114.3 kg (252 lb).               No follow-ups on file.    Manohar Villareal MD  [facility] - [place]      "

## 2019-07-01 ENCOUNTER — OFFICE VISIT (OUTPATIENT)
Dept: FAMILY MEDICINE | Facility: OTHER | Age: 49
End: 2019-07-01
Attending: FAMILY MEDICINE
Payer: COMMERCIAL

## 2019-07-01 VITALS
DIASTOLIC BLOOD PRESSURE: 78 MMHG | BODY MASS INDEX: 37.33 KG/M2 | TEMPERATURE: 98.4 F | HEIGHT: 69 IN | WEIGHT: 252 LBS | SYSTOLIC BLOOD PRESSURE: 122 MMHG | OXYGEN SATURATION: 96 % | HEART RATE: 72 BPM

## 2019-07-01 DIAGNOSIS — E78.2 MIXED HYPERLIPIDEMIA: ICD-10-CM

## 2019-07-01 DIAGNOSIS — I10 ESSENTIAL HYPERTENSION WITH GOAL BLOOD PRESSURE LESS THAN 140/90: ICD-10-CM

## 2019-07-01 DIAGNOSIS — I10 ESSENTIAL HYPERTENSION WITH GOAL BLOOD PRESSURE LESS THAN 140/90: Primary | ICD-10-CM

## 2019-07-01 DIAGNOSIS — Z82.49 FH: CAD (CORONARY ARTERY DISEASE): ICD-10-CM

## 2019-07-01 DIAGNOSIS — E66.01 MORBID OBESITY (H): ICD-10-CM

## 2019-07-01 LAB
ALBUMIN SERPL-MCNC: 3.8 G/DL (ref 3.4–5)
ALP SERPL-CCNC: 74 U/L (ref 40–150)
ALT SERPL W P-5'-P-CCNC: 45 U/L (ref 0–50)
ANION GAP SERPL CALCULATED.3IONS-SCNC: 5 MMOL/L (ref 3–14)
AST SERPL W P-5'-P-CCNC: 19 U/L (ref 0–45)
BASOPHILS # BLD AUTO: 0 10E9/L (ref 0–0.2)
BASOPHILS NFR BLD AUTO: 0.6 %
BILIRUB SERPL-MCNC: 0.4 MG/DL (ref 0.2–1.3)
BUN SERPL-MCNC: 10 MG/DL (ref 7–30)
CALCIUM SERPL-MCNC: 9.1 MG/DL (ref 8.5–10.1)
CHLORIDE SERPL-SCNC: 107 MMOL/L (ref 94–109)
CHOLEST SERPL-MCNC: 193 MG/DL
CO2 SERPL-SCNC: 27 MMOL/L (ref 20–32)
CREAT SERPL-MCNC: 0.82 MG/DL (ref 0.52–1.04)
DIFFERENTIAL METHOD BLD: NORMAL
EOSINOPHIL # BLD AUTO: 0.4 10E9/L (ref 0–0.7)
EOSINOPHIL NFR BLD AUTO: 5.3 %
ERYTHROCYTE [DISTWIDTH] IN BLOOD BY AUTOMATED COUNT: 13.9 % (ref 10–15)
GFR SERPL CREATININE-BSD FRML MDRD: 85 ML/MIN/{1.73_M2}
GLUCOSE SERPL-MCNC: 108 MG/DL (ref 70–99)
HCT VFR BLD AUTO: 40.8 % (ref 35–47)
HDLC SERPL-MCNC: 35 MG/DL
HGB BLD-MCNC: 13.5 G/DL (ref 11.7–15.7)
IMM GRANULOCYTES # BLD: 0 10E9/L (ref 0–0.4)
IMM GRANULOCYTES NFR BLD: 0.3 %
LDLC SERPL CALC-MCNC: 126 MG/DL
LYMPHOCYTES # BLD AUTO: 1.4 10E9/L (ref 0.8–5.3)
LYMPHOCYTES NFR BLD AUTO: 20.6 %
MCH RBC QN AUTO: 29.3 PG (ref 26.5–33)
MCHC RBC AUTO-ENTMCNC: 33.1 G/DL (ref 31.5–36.5)
MCV RBC AUTO: 89 FL (ref 78–100)
MONOCYTES # BLD AUTO: 0.6 10E9/L (ref 0–1.3)
MONOCYTES NFR BLD AUTO: 9.5 %
NEUTROPHILS # BLD AUTO: 4.2 10E9/L (ref 1.6–8.3)
NEUTROPHILS NFR BLD AUTO: 63.7 %
NONHDLC SERPL-MCNC: 158 MG/DL
NRBC # BLD AUTO: 0 10*3/UL
NRBC BLD AUTO-RTO: 0 /100
PLATELET # BLD AUTO: 203 10E9/L (ref 150–450)
POTASSIUM SERPL-SCNC: 3.8 MMOL/L (ref 3.4–5.3)
PROT SERPL-MCNC: 7.3 G/DL (ref 6.8–8.8)
RBC # BLD AUTO: 4.61 10E12/L (ref 3.8–5.2)
SODIUM SERPL-SCNC: 139 MMOL/L (ref 133–144)
TRIGL SERPL-MCNC: 160 MG/DL
WBC # BLD AUTO: 6.6 10E9/L (ref 4–11)

## 2019-07-01 PROCEDURE — 80061 LIPID PANEL: CPT | Performed by: FAMILY MEDICINE

## 2019-07-01 PROCEDURE — 80053 COMPREHEN METABOLIC PANEL: CPT | Performed by: FAMILY MEDICINE

## 2019-07-01 PROCEDURE — 85025 COMPLETE CBC W/AUTO DIFF WBC: CPT | Performed by: FAMILY MEDICINE

## 2019-07-01 PROCEDURE — 99214 OFFICE O/P EST MOD 30 MIN: CPT | Performed by: FAMILY MEDICINE

## 2019-07-01 PROCEDURE — 36415 COLL VENOUS BLD VENIPUNCTURE: CPT | Performed by: FAMILY MEDICINE

## 2019-07-01 ASSESSMENT — PAIN SCALES - GENERAL: PAINLEVEL: MODERATE PAIN (4)

## 2019-07-01 ASSESSMENT — MIFFLIN-ST. JEOR: SCORE: 1842.44

## 2019-07-01 NOTE — PATIENT INSTRUCTIONS
Results for orders placed or performed in visit on 07/01/19   Lipid Profile   Result Value Ref Range    Cholesterol 193 <200 mg/dL    Triglycerides 160 (H) <150 mg/dL    HDL Cholesterol 35 (L) >49 mg/dL    LDL Cholesterol Calculated 126 (H) <100 mg/dL    Non HDL Cholesterol 158 (H) <130 mg/dL   CBC with platelets differential   Result Value Ref Range    WBC 6.6 4.0 - 11.0 10e9/L    RBC Count 4.61 3.8 - 5.2 10e12/L    Hemoglobin 13.5 11.7 - 15.7 g/dL    Hematocrit 40.8 35.0 - 47.0 %    MCV 89 78 - 100 fl    MCH 29.3 26.5 - 33.0 pg    MCHC 33.1 31.5 - 36.5 g/dL    RDW 13.9 10.0 - 15.0 %    Platelet Count 203 150 - 450 10e9/L    Diff Method Automated Method     % Neutrophils 63.7 %    % Lymphocytes 20.6 %    % Monocytes 9.5 %    % Eosinophils 5.3 %    % Basophils 0.6 %    % Immature Granulocytes 0.3 %    Nucleated RBCs 0 0 /100    Absolute Neutrophil 4.2 1.6 - 8.3 10e9/L    Absolute Lymphocytes 1.4 0.8 - 5.3 10e9/L    Absolute Monocytes 0.6 0.0 - 1.3 10e9/L    Absolute Eosinophils 0.4 0.0 - 0.7 10e9/L    Absolute Basophils 0.0 0.0 - 0.2 10e9/L    Abs Immature Granulocytes 0.0 0 - 0.4 10e9/L    Absolute Nucleated RBC 0.0    Comprehensive metabolic panel   Result Value Ref Range    Sodium 139 133 - 144 mmol/L    Potassium 3.8 3.4 - 5.3 mmol/L    Chloride 107 94 - 109 mmol/L    Carbon Dioxide 27 20 - 32 mmol/L    Anion Gap 5 3 - 14 mmol/L    Glucose 108 (H) 70 - 99 mg/dL    Urea Nitrogen 10 7 - 30 mg/dL    Creatinine 0.82 0.52 - 1.04 mg/dL    GFR Estimate 85 >60 mL/min/[1.73_m2]    GFR Estimate If Black >90 >60 mL/min/[1.73_m2]    Calcium 9.1 8.5 - 10.1 mg/dL    Bilirubin Total 0.4 0.2 - 1.3 mg/dL    Albumin 3.8 3.4 - 5.0 g/dL    Protein Total 7.3 6.8 - 8.8 g/dL    Alkaline Phosphatase 74 40 - 150 U/L    ALT 45 0 - 50 U/L    AST 19 0 - 45 U/L       Patient Education     Identifying Your Heart Risks  What are your risk factors?    A risk factor increases your chance of having heart disease. Some risk factors can t be  controlled, such as age or family history of heart disease. But most others can be managed by making lifestyle changes and taking medicine. For each risk factor you reduce, your chance of heart attack and stroke goes down. And, the length and quality of life may go up.  You can make changes to manage the following risk factors.  Abnormal cholesterol levels  Abnormal levels of cholesterol can increase your risk of developing atherosclerotic cardiovascular disease (ASCVD), which can lead to a heart, stroke, or other problems. If your cholesterol levels are of concern, your healthcare provider will work with you to improve your cholesterol level. Lifestyle changes such as diet, exercise, and weight management can help improve your cholesterol level, but you may also need medicine.  High blood pressure  High blood pressure (hypertension) occurs when blood pushes too hard against artery walls as it flows through them. This damages the artery lining. In general, you re at risk if you have:    Blood pressure of 120/80 or higher. Your doctor may prescribe a personal goal.    Blood pressure of 130/80 is high blood pressure.  Smoking  This is the most important risk factor you can change. Smoking damages arteries and makes it easier for plaque to build up. Smokers are also at higher risk for blood clots (which can block arteries) and stroke. You re at risk if you use any kind of tobacco or nicotine. This means:    Cigarettes    E-cigarettes    Chew tobacco    Cigars    Pipe  Diabetes  This health problem leads to a high level of sugar in your blood. It can damage the arteries if not kept under control. Diabetes makes you more likely to have a silent heart attack (one without symptoms). You re at risk if:    Your A1C is between 5.7 and 6.4. Once it reaches 6.5, you have diabetes.  Your healthcare provider will help you figure out what your A1C should be. Your target number will depend on your age, general health, and other  factors. Your treatment plan may need changes if your current number is too high.  Excess weight  Being overweight makes other risk factors, such as high blood pressure and diabetes, more likely. Excess weight around the waist or stomach increases your heart disease risk the most. You re at risk if your:    Waist circumference is more than 35 inches (women) or 40 inches (men).    Body mass index (BMI) is greater than 25.  Lack of physical activity  If you re not active, problems with diabetes, blood pressure, cholesterol, and weight are more likely. You re at risk if:    You exercise less than 40 minutes per day, on fewer than 3 to 4 days a week.  Stress and strong emotions  Stressful events and feelings can raise heart rate and blood pressure. Stress can also bring on feelings of depression, anxiety, and anger. These feelings do not directly lead to heart disease, but they do affect overall health and make quality of life worse.  Date Last Reviewed: 7/1/2016 2000-2018 [employer]. [address] [place] [address]. All rights reserved. This information is not intended as a substitute for professional medical care. Always follow your healthcare professional's instructions.           Patient Education     Your Heart Risk Action Plan    To reduce your risk of a future heart attack, make lifestyle changes and get treatment for these risk factors:  Smoking    Set a quit date within the next month.    Ask your healthcare provider to help quitting smoking.    Stay away from secondhand smoke.  High cholesterol    Take cholesterol medicines as directed.    Choose heart healthy foods that are low in saturated fat and cholesterol. Eat plenty of fruits and vegetables.    Depending on your case, your healthcare provider may recommend that you include moderate to vigorous intensity physical activity for at least 40 minutes each day for at least 3 to 4 days per week.    If you are overweight or obese,  your healthcare provider will work with you to lose weight and lower your body mass index (BMI) to a normal or near-normal level.     Quit smoking.  High blood pressure    Take blood pressure medicines as directed.    Reduce your sodium intake to 2,400 milligrams (mg) per day, or less as directed.    Depending on your case, your healthcare provider may recommend that you include moderate to vigorous intensity physical activity for at least 40 minutes each day for at least 3 to 4 days per week.    If you are overweight or obese, your healthcare provider will work with you to lose weight and lower your body mass index (BMI) to a normal or near-normal level.    Quit smoking.  Being overweight    Your healthcare provider will give you information on dietary changes that you may need to make, based on your case. Your provider may recommend that you see a registered dietitian for help with diet changes.    Depending on your case, your healthcare provider may recommend that you include moderate to vigorous intensity physical activity for at least 40 minutes each day for at least 3 to 4 days per week.    Use a log to track your eating and activity habits.  Diabetes    Take diabetes medicines or insulin as instructed.    Test blood sugar as directed.    Depending on your case, your healthcare provider may recommend that you include moderate to vigorous intensity physical activity for at least 40 minutes each day for at least 3 to 4 days per week.    If you are overweight or obese, your healthcare provider will work with you to lose weight and lower your body mass index (BMI) to a normal or near-normal level.    Take a diabetes education class.  Stress, anger, and depression    Stay in touch with family and friends.    Consider starting counseling or joining a support group.    Depending on your case, your healthcare provider may recommend that you include moderate to vigorous intensity physical activity for at least 40  "minutes each day for at least 3 to 4 days per week.  Lack of exercise    Work with a healthcare provider to create an exercise program.    Join a cardiac rehab program.    Depending on your case, your healthcare provider may recommend that you include moderate to vigorous intensity physical activity for at least 40 minutes each day for at least 3 to 4 days per week.  How to make changes    Encourage family members and friends to make changes with you. This supports you and benefits them, as well.    Don t try too much too soon. Get comfortable with one change before tackling another.    If you re discouraged, focus on how good you ll feel once you ve made a change.    Reward yourself when you ve successfully made a change. Treat yourself to a meal at your favorite restaurant or buy that new book or pair of shoes you wanted.  Date Last Reviewed: 6/1/2016 2000-2018 [employer]. [address] [place] [address]. All rights reserved. This information is not intended as a substitute for professional medical care. Always follow your healthcare professional's instructions.           Patient Education     Controlling Your Cholesterol  Cholesterol is a waxy substance. It travels in your blood through the blood vessels. When you have high cholesterol, it can build up along the walls of the blood vessels. This makes the vessels narrower and decreases blood flow. You are then at greater risk of having a heart attack or a stroke.  Good and bad cholesterol  Lipids are fats, and blood is mostly water. Fat and water don't mix. So our bodies need lipoproteins (lipids inside a protein shell) to carry the lipids. The protein shell carries its lipids through the bloodstream. There are two main kinds of lipoproteins:    LDL (low-density lipoprotein) is known as \"bad cholesterol.\" It mainly carries cholesterol. It delivers this cholesterol to body cells. Excess LDL cholesterol will build up in artery walls. This " "increases your risk for heart disease and stroke.    HDL (high-density lipoprotein) is known as \"good cholesterol.\" This protein shell collects excess cholesterol that LDLs have left behind on blood vessel walls. That's why high levels of HDL cholesterol can decrease your risk of heart disease and stroke.  Controlling cholesterol levels  Total cholesterol includes LDL and HDL cholesterol, as well as other fats in the bloodstream. If your total cholesterol is high, follow the steps below to help lower your total cholesterol level:  Eat less unhealthy fat    Cut back on saturated fats and trans fats (also called hydrogenated) by selecting lean cuts of meat, low-fat dairy, and using oils instead of solid fats. Limit baked goods, processed meats, and fried foods. A diet that s high in these fats increases your bad cholesterol. It's not enough to just cut back on foods containing cholesterol.    Eat about 2 servings of fish per week. Most fish contain omega-3 fatty acids. These help lower blood cholesterol.    Eat more whole grains and soluble fiber (such as oat bran). These lower overall cholesterol.  Be active    Choose an activity you enjoy. Walking, swimming, and riding a bike are some good ways to be active.    Start at a level where you feel comfortable. Increase your time and pace a little each week.    Work up to 30 to 40 minutes of moderate to high intensity physical activity at least 3 to 4 days per week.    Remember, some activity is better than none.    If you haven't been exercising regularly, start slowly. Check with your healthcare provider to make sure the exercise plan is right for you.  Quit smoking  Quitting smoking can improve your lipid levels. It also lowers your risk for heart disease and stroke.  Manage your weight  If you are overweight or obese, your healthcare provider will work with you to lose weight and lower your BMI (body mass index) to a normal or near-normal level. Making diet changes " and increasing physical activity can help.  Take medicine as directed  Many people need medicine to get their LDL levels to a safe level. Medicine to lower cholesterol levels is effective and safe. Taking medicine is not a substitute for exercise or watching your diet! Your healthcare provider can tell you whether you might benefit from a cholesterol-lowering medicine.  Date Last Reviewed: 6/1/2017 2000-2018 [employer]. [address] [place] [address], [place] [id number]. All rights reserved. This information is not intended as a substitute for professional medical care. Always follow your healthcare professional's instructions.

## 2019-07-01 NOTE — NURSING NOTE
"Chief Complaint   Patient presents with     Hypertension       Initial Blood Pressure 122/78   Pulse 72   Temperature 98.4  F (36.9  C)   Height 1.753 m (5' 9\")   Weight 114.3 kg (252 lb)   Last Menstrual Period 08/18/2013   Oxygen Saturation 96%   Body Mass Index 37.21 kg/m   Estimated body mass index is 37.21 kg/m  as calculated from the following:    Height as of this encounter: 1.753 m (5' 9\").    Weight as of this encounter: 114.3 kg (252 lb).  Medication Reconciliation: complete  "

## 2019-07-24 DIAGNOSIS — R09.A2 GLOBUS SENSATION: ICD-10-CM

## 2019-07-24 DIAGNOSIS — K21.9 LPRD (LARYNGOPHARYNGEAL REFLUX DISEASE): ICD-10-CM

## 2019-07-24 RX ORDER — OMEPRAZOLE 40 MG/1
CAPSULE, DELAYED RELEASE ORAL
Qty: 30 CAPSULE | Refills: 2 | Status: SHIPPED | OUTPATIENT
Start: 2019-07-24 | End: 2022-03-01

## 2019-07-24 NOTE — TELEPHONE ENCOUNTER
Prilosec      Last Written Prescription Date:  10/09/2018  Last Fill Quantity: 30,   # refills: 3  Last Office Visit: 7/01/2019  Future Office visit:

## 2019-08-21 ENCOUNTER — OFFICE VISIT (OUTPATIENT)
Dept: CHIROPRACTIC MEDICINE | Facility: OTHER | Age: 49
End: 2019-08-21
Attending: CHIROPRACTOR
Payer: COMMERCIAL

## 2019-08-21 DIAGNOSIS — M54.2 CERVICALGIA: ICD-10-CM

## 2019-08-21 DIAGNOSIS — M99.02 SEGMENTAL AND SOMATIC DYSFUNCTION OF THORACIC REGION: Primary | ICD-10-CM

## 2019-08-21 DIAGNOSIS — M99.03 SEGMENTAL AND SOMATIC DYSFUNCTION OF LUMBAR REGION: ICD-10-CM

## 2019-08-21 PROCEDURE — 98940 CHIROPRACT MANJ 1-2 REGIONS: CPT | Mod: AT | Performed by: CHIROPRACTOR

## 2019-08-21 NOTE — PROGRESS NOTES
Subjective Finding:    Chief compalint: Patient presents with:  Back Pain  Neck Pain  , Pain Scale: 5/10, Intensity: sharp, Duration: 1 week, Radiating: no.    Date of injury:     Activities that the pain restricts:   Home/household/hobbies/social activities: no.  Work duties: no.  Sleep: yes.  Makes symptoms better: rest.  Makes symptoms worse: activity and lumbar extension.  Have you seen anyone else for the symptoms? No.  Work related: no.  Automobile related injury: no.    Objective and Assessment:    Posture Analysis:   High shoulder: right.  Head tilt: right.  High iliac crest: right.  Head carriage: neutral.  Thoracic Kyphosis: neutral.  Lumbar Lordosis: forward.    Lumbar Range of Motion: flexion decreased and extension decreased.  Cervical Range of Motion: extension decreased.  Thoracic Range of Motion: .  Extremity Range of Motion: .    Palpation:   CT jxn     Segmental dysfunction pre-treatment and treatment area: C56  T4.    Assessment post-treatment:  Cervical: ROM increased.  Thoracic: ROM increased.  Lumbar: ROM increased and pain and tenderness decreased.    Comments: .      Complicating Factors: .    Plan / Procedure:    Treatment plan: PRN.  Instructed patient: stretch as instructed at visit and walk 10 minutes.  Short term goals: reduce pain.  Long term goals: restore normal function.  Prognosis: excellent.

## 2019-09-09 ENCOUNTER — TELEPHONE (OUTPATIENT)
Dept: FAMILY MEDICINE | Facility: OTHER | Age: 49
End: 2019-09-09

## 2019-09-09 ENCOUNTER — OFFICE VISIT (OUTPATIENT)
Dept: FAMILY MEDICINE | Facility: OTHER | Age: 49
End: 2019-09-09
Attending: FAMILY MEDICINE
Payer: COMMERCIAL

## 2019-09-09 ENCOUNTER — ANCILLARY PROCEDURE (OUTPATIENT)
Dept: GENERAL RADIOLOGY | Facility: OTHER | Age: 49
End: 2019-09-09
Attending: FAMILY MEDICINE
Payer: COMMERCIAL

## 2019-09-09 VITALS
BODY MASS INDEX: 37.21 KG/M2 | HEIGHT: 69 IN | SYSTOLIC BLOOD PRESSURE: 120 MMHG | HEART RATE: 61 BPM | TEMPERATURE: 98.4 F | DIASTOLIC BLOOD PRESSURE: 70 MMHG | OXYGEN SATURATION: 97 %

## 2019-09-09 PROCEDURE — 73610 X-RAY EXAM OF ANKLE: CPT | Mod: TC

## 2019-09-09 PROCEDURE — 99213 OFFICE O/P EST LOW 20 MIN: CPT | Performed by: FAMILY MEDICINE

## 2019-09-09 ASSESSMENT — PAIN SCALES - GENERAL: PAINLEVEL: EXTREME PAIN (8)

## 2019-09-09 NOTE — PROGRESS NOTES
"Subjective     Paulette Flores is a 48 year old female who presents to clinic today for the following health issues:    HPI   Musculoskeletal problem/pain      Duration: saturday    Description  Location: right ankle    Intensity:  moderate    Accompanying signs and symptoms: swelling and discoloration of top of foot    History  Previous similar problem: no   Previous evaluation:  none    Precipitating or alleviating factors:  Trauma or overuse: fell in hole in yard  Aggravating factors include: standing and walking    Therapies tried and outcome: ice, acetaminophen and Ibuprofen    Did fall - stepped in hole    Did ok for awhile then increase swelling and pain later    Some less swelling     Pain and swelling over lateral ankle - after inversion injury               Reviewed and updated as needed this visit by Provider         Review of Systems   ROS COMP: CONSTITUTIONAL: NEGATIVE for fever, chills, change in weight  RESP: NEGATIVE for significant cough or SOB  CV: NEGATIVE for chest pain, palpitations or peripheral edema      Objective    /70   Pulse 61   Temp 98.4  F (36.9  C)   Ht 1.753 m (5' 9\")   LMP 08/18/2013   SpO2 97%   BMI 37.21 kg/m    Body mass index is 37.21 kg/m .  Physical Exam   GENERAL: healthy, alert and no distress  MS:  Right  ankle -  gross musculoskeletal defects noted of lateral swelling - no burising , no other  Edema, tender over lateral tendons.  No tib / fib pain. Some foot pain over lateral mid foot. CMS intact     XR- No Fracture        Assessment & Plan     1. Grade 2 ankle sprain, right, initial encounter  Discussed. Discussed in length conservative measures of OTC medications for pain, Ice/Heat, elevation and the concept of R.I.C.E.. Continue behavioral changes and proper body mechanics to allow for healing. Follow up as directed.   Discussed treatment of sprains and strains. R.I.C.E discussed and splint given if deemed necessary. OTC NSAIDS for pain and inflammation " "discussed.   Splint and exercises given.  Call if not improving- send to PT  - XR Ankle Right G/E 3 Views; Future  - Elastic Bandages & Supports (ANKLE SPLINT/CANVAS LARGE) MISC; 1 each daily  Dispense: 1 each; Refill: 0     BMI:   Estimated body mass index is 37.21 kg/m  as calculated from the following:    Height as of this encounter: 1.753 m (5' 9\").    Weight as of 7/1/19: 114.3 kg (252 lb).           Work on weight loss  Regular exercise    No follow-ups on file.    Manohar Villareal MD  [facility] - [place]      "

## 2019-09-09 NOTE — NURSING NOTE
"Chief Complaint   Patient presents with     Musculoskeletal Problem       Initial /70   Pulse 61   Temp 98.4  F (36.9  C)   Ht 1.753 m (5' 9\")   LMP 08/18/2013   SpO2 97%   BMI 37.21 kg/m   Estimated body mass index is 37.21 kg/m  as calculated from the following:    Height as of this encounter: 1.753 m (5' 9\").    Weight as of 7/1/19: 114.3 kg (252 lb).  Medication Reconciliation: complete  "

## 2019-09-09 NOTE — PATIENT INSTRUCTIONS
Patient Education     Treating Ankle Sprains  Treatment will depend on how bad your sprain is. For a severe sprain, healing may take 3 months or more.  Right after your injury: Use R.I.C.E.    BIG: Rest: At first, keep weight off the ankle as much as you can. You may be given crutches to help you walk without putting weight on the ankle.    BIG: Ice: Put an ice pack on the ankle for 20 minutes. Remove the pack and wait at least 30 minutes. Repeat for up to 3 days. This helps reduce swelling.    BIG: Compression: To reduce swelling and keep the joint stable, you may need to wrap the ankle with an elastic bandage. For more severe sprains, you may need an ankle brace, a boot, or a cast.    BIG: Elevation: To reduce swelling, keep your ankle raised above your heart when you sit or lie down.  Medicine  Your healthcare provider may suggest oral nonsteroidal anti-inflammatory medicine (NSAIDs), such as ibuprofen. This relieves the pain and helps reduce swelling. Be sure to take your medicine as directed.  Exercises    After about 2 to 3 weeks, you may be given exercises to strengthen the ligaments and muscles in the ankle. Doing these exercises will help prevent another ankle sprain. Exercises may include standing on your toes and then on your heels and doing ankle curls.    Sit on the edge of a sturdy table or lie on your back.    Pull your toes toward you. Then point them away from you. Repeat for 2 to 3 minutes.  Date Last Reviewed: 1/1/2018 2000-2018 [employer]. [address] [place] [address], [place] [id number]. All rights reserved. This information is not intended as a substitute for professional medical care. Always follow your healthcare professional's instructions.           Patient Education     Understanding Ankle Sprain    The ankle is the joint where the leg and foot meet. Bones are held in place by connective tissue called ligaments. When ankle ligaments are stretched to the point of pain and  injury, it is called an ankle sprain. A sprain can tear the ligaments. These tears can be very small but still cause pain. Ankle sprains can be mild or severe.  What causes an ankle sprain?  A sprain may occur when you twist your ankle or bend it too far. This can happen when you stumble or fall. Things that can make an ankle sprain more likely include:    Having had an ankle sprain before    Playing sports that involve running and jumping. Or playing contact sports such as football or hockey.    Wearing shoes that don t support your feet and ankles well    Having ankles with poor strength and flexibility  Symptoms of an ankle sprain  Symptoms may include:    Pain or soreness in the ankle    Swelling    Redness or bruising    Not being able to walk or put weight on the affected foot    Reduced range of motion in the ankle    A popping or tearing feeling at the time the sprain occurs    An abnormal or dislocated look to the ankle    Instability or too much range of motion in the ankle  Treatment for an ankle sprain  Treatment focuses on reducing pain and swelling, and avoiding further injury. Treatments may include:    Resting the ankle. Avoid putting weight on it. This may mean using crutches until the sprain heals.    Prescription or over-the-counter pain medicines. These help reduce swelling and pain.    Cold packs. These help reduce pain and swelling.    Raising your ankle above your heart. This helps reduce swelling.    Wrapping the ankle with an elastic bandage or ankle brace. This helps reduce swelling and gives some support to the ankle. In rare cases, you may need a cast or boot.    Stretching and other exercises. These improve flexibility and strength.    Heat packs. These may be recommended before doing ankle exercises.  Possible complications of an ankle sprain  An ankle that has been weakened by a sprain can be more likely to have repeated sprains afterward. Doing exercises to strengthen your ankle and  improve balance can reduce your risk for repeated sprains. Other possible complications are long-term (chronic) pain or an ankle that remains unstable.  When to call your healthcare provider  Call your healthcare provider right away if you have any of these:    Fever of 100.4 F (38 C) or higher, or as directed    Pain, numbness, discoloration, or coldness in the foot or toes    Pain that gets worse    Symptoms that don t get better, or get worse    New symptoms   Date Last Reviewed: 3/10/2016    0689-6859 [employer]. [address] [place] [address]. All rights reserved. This information is not intended as a substitute for professional medical care. Always follow your healthcare professional's instructions.           Patient Education     Ankle Inversion (Strength)     This exercise is for your right ankle. Switch sides for your left ankle.   1. Tie an elastic exercise band or tubing to the leg of a table. Tie the other end to your right foot.  2. Stand far enough away from the table so that the elastic band or tubing is pulled tight.  3. Point your right foot firmly to the left, pulling on the elastic band or tubing. Hold for 5 seconds.  4. Relax your foot back to a straight position. Repeat this exercise 10 times.  5. Do this exercise 3 times a day, or as instructed.  Date Last Reviewed: 5/1/2016 2000-2018 [employer]. [address] [place] [address]. All rights reserved. This information is not intended as a substitute for professional medical care. Always follow your healthcare professional's instructions.           Patient Education     Self-Care for Strains and Sprains  Most minor strains and sprains can be treated with self-care. Recovering from a strain or sprain may take 6 to 8 weeks. Your self-care goal is to reduce pain and immobilize the injury to speed healing.     A sprain injures ligaments (tissue that connects bones to bones).      A strain injures  muscles or tendons (tissue that connects muscles to bones).   Support the injured area  Wrapping the injured area provides support for short, necessary activities. Be careful not to wrap the area too tightly. This could cut off the blood supply.    Support a wrist, elbow, or shoulder with a sling.    Wrap an ankle or knee with an elastic bandage.    Tape a finger or toe to the one next to it.  Use cold and heat  Cold reduces swelling. Both cold and heat reduce pain. Heat should not be used in the initial treatment of the injury. When using cold or heat, always place a thin towel between the pack and your skin.    Apply ice or a cold pack 10 to 15 minutes every hour you re awake for the first 2 days.    After the swelling goes down, use cold or heat to control pain. Don t use heat late in the day, since it can cause swelling when you re not active.  Rest and elevate  Rest and elevation help your injury heal faster.    Raise the injured area above your heart level.    Keep the injured area from moving.    Limit the use of the joint or limb.  Use medicine    Aspirin reduces pain and swelling. (Note: Don t give aspirin to a child 18 or younger unless prescribed by the doctor.)    Non-steroidal anti-inflammatory medicines, such as ibuprofen, may reduce pain and swelling, as well. Ask your healthcare provider for advice.  When to call your healthcare provider  Call your healthcare provider if:    The injured joint won t move, or bones make a grating sound when they move    You can t put weight on the injured area, even after 24 hours    The injured body part is cold, blue, tingling, or numb    The joint or limb appears bent or crooked.    Pain increases or doesn t improve in 4 days    When pressing along the injured area, you notice a spot that is especially painful  Date Last Reviewed: 5/1/2018 2000-2018 [employer]. [address] [place] [address], [place] [address]. All rights reserved. This information is not  intended as a substitute for professional medical care. Always follow your healthcare professional's instructions.

## 2019-09-09 NOTE — TELEPHONE ENCOUNTER
8:19 AM    Reason for Call: OVERBOOK    Patient is having the following symptoms: Patient has an appointment for today at 1:15 but would like to get in earlier if possible.    The patient is requesting an appointment for Today AM with Dr Villareal    Was an appointment offered for this call? Has one today @ 1:15    Preferred method for responding to this message: [phone]    If we cannot reach you directly, may we leave a detailed response at the number you provided? Yes      Sofía Zarate

## 2019-10-14 ENCOUNTER — MEDICAL CORRESPONDENCE (OUTPATIENT)
Dept: HEALTH INFORMATION MANAGEMENT | Facility: CLINIC | Age: 49
End: 2019-10-14

## 2019-11-25 NOTE — PROGRESS NOTES
Subjective     Paulette Flores is a 49 year old female who presents to clinic today for the following health issues:    HPI   Hyperlipidemia Follow-Up      Are you regularly taking any medication or supplement to lower your cholesterol?   No    Are you having muscle aches or other side effects that you think could be caused by your cholesterol lowering medication?  No      How many servings of fruits and vegetables do you eat daily?  2-3    On average, how many sweetened beverages do you drink each day (Examples: soda, juice, sweet tea, etc.  Do NOT count diet or artificially sweetened beverages)?   0    How many days per week do you miss taking your medication? 0    Pt is here to discussed her lipids again         Current Outpatient Medications   Medication Sig Dispense Refill     aspirin 81 MG chewable tablet Take 1 tablet (81 mg) by mouth daily (Patient taking differently: Take 81 mg by mouth every other day ) 90 tablet 3     Elastic Bandages & Supports (ANKLE SPLINT/CANVAS LARGE) MISC 1 each daily 1 each 0     fluticasone (FLONASE) 50 MCG/ACT spray Spray 1 spray into both nostrils daily       omeprazole (PRILOSEC) 40 MG DR capsule TAKE 1 CAPSULE BY MOUTH DAILY, TAKE 30 TO 60 MINUTES BEFORE A MEAL 30 capsule 2     simvastatin (ZOCOR) 20 MG tablet Take 1 tablet (20 mg) by mouth At Bedtime 60 tablet 0     valsartan-hydrochlorothiazide (DIOVAN HCT) 80-12.5 MG tablet Take 1 tablet by mouth daily 90 tablet 3     Allergies   Allergen Reactions     Ace Inhibitors Cough     Penicillins      Sulfa Drugs      SULFA (SULFONAMIDE ANTIBIOTICS)     Adhesive Tape Itching and Rash     Band aids.       Reviewed and updated as needed this visit by Provider         Review of Systems   ROS COMP: CONSTITUTIONAL: NEGATIVE for fever, chills, change in weight  RESP: NEGATIVE for significant cough or SOB  CV: NEGATIVE for chest pain, palpitations or peripheral edema      Objective    /72   Pulse 65   Temp 98.2  F (36.8  C)   Ht  "1.753 m (5' 9\")   Wt 117.5 kg (259 lb)   LMP 08/18/2013   SpO2 97%   BMI 38.25 kg/m    There is no height or weight on file to calculate BMI.  Physical Exam   GENERAL: healthy, alert and no distress            Assessment & Plan       ICD-10-CM    1. Mixed hyperlipidemia E78.2 simvastatin (ZOCOR) 20 MG tablet     Lipid Profile     Hepatic panel   Discussed again risk and benefits and her risk for CVA/CAD- she would like to try low dose statin. Zocor ordered and labs for 6-8 weeks       BMI:   Estimated body mass index is 38.25 kg/m  as calculated from the following:    Height as of this encounter: 1.753 m (5' 9\").    Weight as of this encounter: 117.5 kg (259 lb).               No follow-ups on file.    Manohar Villareal MD  [facility] [place]    "

## 2019-12-03 ENCOUNTER — OFFICE VISIT (OUTPATIENT)
Dept: FAMILY MEDICINE | Facility: OTHER | Age: 49
End: 2019-12-03
Attending: FAMILY MEDICINE
Payer: COMMERCIAL

## 2019-12-03 VITALS
HEIGHT: 69 IN | DIASTOLIC BLOOD PRESSURE: 72 MMHG | BODY MASS INDEX: 38.36 KG/M2 | SYSTOLIC BLOOD PRESSURE: 122 MMHG | OXYGEN SATURATION: 97 % | WEIGHT: 259 LBS | HEART RATE: 65 BPM | TEMPERATURE: 98.2 F

## 2019-12-03 DIAGNOSIS — E78.2 MIXED HYPERLIPIDEMIA: Primary | ICD-10-CM

## 2019-12-03 PROCEDURE — 99213 OFFICE O/P EST LOW 20 MIN: CPT | Performed by: FAMILY MEDICINE

## 2019-12-03 RX ORDER — SIMVASTATIN 20 MG
20 TABLET ORAL AT BEDTIME
Qty: 60 TABLET | Refills: 0 | Status: SHIPPED | OUTPATIENT
Start: 2019-12-03 | End: 2020-02-05

## 2019-12-03 ASSESSMENT — PAIN SCALES - GENERAL: PAINLEVEL: NO PAIN (0)

## 2019-12-03 ASSESSMENT — MIFFLIN-ST. JEOR: SCORE: 1869.2

## 2019-12-03 NOTE — NURSING NOTE
"Chief Complaint   Patient presents with     Lipids       Initial /72   Pulse 65   Temp 98.2  F (36.8  C)   Ht 1.753 m (5' 9\")   Wt 117.5 kg (259 lb)   LMP 08/18/2013   SpO2 97%   BMI 38.25 kg/m   Estimated body mass index is 38.25 kg/m  as calculated from the following:    Height as of this encounter: 1.753 m (5' 9\").    Weight as of this encounter: 117.5 kg (259 lb).  Medication Reconciliation: complete  Wilber Resendez LPN  "

## 2020-02-05 DIAGNOSIS — E78.2 MIXED HYPERLIPIDEMIA: ICD-10-CM

## 2020-02-05 LAB
ALBUMIN SERPL-MCNC: 3.9 G/DL (ref 3.4–5)
ALP SERPL-CCNC: 66 U/L (ref 40–150)
ALT SERPL W P-5'-P-CCNC: 49 U/L (ref 0–50)
AST SERPL W P-5'-P-CCNC: 20 U/L (ref 0–45)
BILIRUB DIRECT SERPL-MCNC: 0.1 MG/DL (ref 0–0.2)
BILIRUB SERPL-MCNC: 0.5 MG/DL (ref 0.2–1.3)
CHOLEST SERPL-MCNC: 143 MG/DL
HDLC SERPL-MCNC: 45 MG/DL
LDLC SERPL CALC-MCNC: 73 MG/DL
NONHDLC SERPL-MCNC: 98 MG/DL
PROT SERPL-MCNC: 7.3 G/DL (ref 6.8–8.8)
TRIGL SERPL-MCNC: 127 MG/DL

## 2020-02-05 PROCEDURE — 80061 LIPID PANEL: CPT | Performed by: FAMILY MEDICINE

## 2020-02-05 PROCEDURE — 80076 HEPATIC FUNCTION PANEL: CPT | Performed by: FAMILY MEDICINE

## 2020-02-05 PROCEDURE — 36415 COLL VENOUS BLD VENIPUNCTURE: CPT | Performed by: FAMILY MEDICINE

## 2020-02-05 RX ORDER — SIMVASTATIN 20 MG
20 TABLET ORAL AT BEDTIME
Qty: 90 TABLET | Refills: 3 | Status: SHIPPED | OUTPATIENT
Start: 2020-02-05 | End: 2020-08-26

## 2020-02-22 DIAGNOSIS — I10 ESSENTIAL HYPERTENSION WITH GOAL BLOOD PRESSURE LESS THAN 140/90: ICD-10-CM

## 2020-02-24 RX ORDER — VALSARTAN AND HYDROCHLOROTHIAZIDE 80; 12.5 MG/1; MG/1
1 TABLET, FILM COATED ORAL DAILY
Qty: 30 TABLET | Refills: 1 | Status: SHIPPED | OUTPATIENT
Start: 2020-02-24 | End: 2020-06-09

## 2020-03-02 ENCOUNTER — HEALTH MAINTENANCE LETTER (OUTPATIENT)
Age: 50
End: 2020-03-02

## 2020-03-23 ENCOUNTER — TELEPHONE (OUTPATIENT)
Dept: FAMILY MEDICINE | Facility: OTHER | Age: 50
End: 2020-03-23

## 2020-03-23 NOTE — TELEPHONE ENCOUNTER
2:54 PM    Reason for Call: Phone Call    Description: pt  Rescheduled physical but would like to know how to handle Rx refills and if she would need labs    Was an appointment offered for this call? No  If yes : Appointment type              Date    Preferred method for responding to this message: Telephone Call  What is your phone number ? [phone]    If we cannot reach you directly, may we leave a detailed response at the number you provided? Yes    Can this message wait until your PCP/provider returns, if available today? YES, No, provider is in    Tiffani Rudolph

## 2020-03-23 NOTE — TELEPHONE ENCOUNTER
Patient notified to still call pharmacy for refills and will contact her if lab work needed prior to refill.

## 2020-03-24 ENCOUNTER — TELEPHONE (OUTPATIENT)
Dept: FAMILY MEDICINE | Facility: OTHER | Age: 50
End: 2020-03-24

## 2020-03-24 DIAGNOSIS — R30.0 DYSURIA: Primary | ICD-10-CM

## 2020-03-24 NOTE — TELEPHONE ENCOUNTER
Can she come and do a UA? Having low back pain with urine odor? Feels very nervous about coming into clinic.

## 2020-03-25 DIAGNOSIS — I10 ESSENTIAL HYPERTENSION WITH GOAL BLOOD PRESSURE LESS THAN 140/90: Primary | ICD-10-CM

## 2020-03-25 DIAGNOSIS — E78.2 MIXED HYPERLIPIDEMIA: ICD-10-CM

## 2020-03-25 DIAGNOSIS — R30.0 DYSURIA: ICD-10-CM

## 2020-03-25 LAB
ALBUMIN SERPL-MCNC: 4.1 G/DL (ref 3.4–5)
ALBUMIN UR-MCNC: NEGATIVE MG/DL
ALP SERPL-CCNC: 63 U/L (ref 40–150)
ALT SERPL W P-5'-P-CCNC: 48 U/L (ref 0–50)
ANION GAP SERPL CALCULATED.3IONS-SCNC: 4 MMOL/L (ref 3–14)
APPEARANCE UR: CLEAR
AST SERPL W P-5'-P-CCNC: 23 U/L (ref 0–45)
BACTERIA #/AREA URNS HPF: ABNORMAL /HPF
BASOPHILS # BLD AUTO: 0.1 10E9/L (ref 0–0.2)
BASOPHILS NFR BLD AUTO: 0.9 %
BILIRUB SERPL-MCNC: 0.4 MG/DL (ref 0.2–1.3)
BILIRUB UR QL STRIP: NEGATIVE
BUN SERPL-MCNC: 12 MG/DL (ref 7–30)
CALCIUM SERPL-MCNC: 9.2 MG/DL (ref 8.5–10.1)
CHLORIDE SERPL-SCNC: 106 MMOL/L (ref 94–109)
CHOLEST SERPL-MCNC: 160 MG/DL
CO2 SERPL-SCNC: 30 MMOL/L (ref 20–32)
COLOR UR AUTO: ABNORMAL
CREAT SERPL-MCNC: 0.71 MG/DL (ref 0.52–1.04)
DIFFERENTIAL METHOD BLD: NORMAL
EOSINOPHIL # BLD AUTO: 0.3 10E9/L (ref 0–0.7)
EOSINOPHIL NFR BLD AUTO: 4 %
ERYTHROCYTE [DISTWIDTH] IN BLOOD BY AUTOMATED COUNT: 13.7 % (ref 10–15)
GFR SERPL CREATININE-BSD FRML MDRD: >90 ML/MIN/{1.73_M2}
GLUCOSE SERPL-MCNC: 94 MG/DL (ref 70–99)
GLUCOSE UR STRIP-MCNC: NEGATIVE MG/DL
HCT VFR BLD AUTO: 42.6 % (ref 35–47)
HDLC SERPL-MCNC: 46 MG/DL
HGB BLD-MCNC: 13.9 G/DL (ref 11.7–15.7)
HGB UR QL STRIP: NEGATIVE
IMM GRANULOCYTES # BLD: 0 10E9/L (ref 0–0.4)
IMM GRANULOCYTES NFR BLD: 0.4 %
KETONES UR STRIP-MCNC: NEGATIVE MG/DL
LDLC SERPL CALC-MCNC: 68 MG/DL
LEUKOCYTE ESTERASE UR QL STRIP: NEGATIVE
LYMPHOCYTES # BLD AUTO: 2.1 10E9/L (ref 0.8–5.3)
LYMPHOCYTES NFR BLD AUTO: 25.2 %
MCH RBC QN AUTO: 29.3 PG (ref 26.5–33)
MCHC RBC AUTO-ENTMCNC: 32.6 G/DL (ref 31.5–36.5)
MCV RBC AUTO: 90 FL (ref 78–100)
MONOCYTES # BLD AUTO: 0.9 10E9/L (ref 0–1.3)
MONOCYTES NFR BLD AUTO: 10.7 %
NEUTROPHILS # BLD AUTO: 4.8 10E9/L (ref 1.6–8.3)
NEUTROPHILS NFR BLD AUTO: 58.8 %
NITRATE UR QL: NEGATIVE
NONHDLC SERPL-MCNC: 114 MG/DL
NRBC # BLD AUTO: 0 10*3/UL
NRBC BLD AUTO-RTO: 0 /100
PH UR STRIP: 5.5 PH (ref 4.7–8)
PLATELET # BLD AUTO: 221 10E9/L (ref 150–450)
POTASSIUM SERPL-SCNC: 3.8 MMOL/L (ref 3.4–5.3)
PROT SERPL-MCNC: 7.7 G/DL (ref 6.8–8.8)
RBC # BLD AUTO: 4.75 10E12/L (ref 3.8–5.2)
RBC #/AREA URNS AUTO: 0 /HPF (ref 0–2)
SODIUM SERPL-SCNC: 140 MMOL/L (ref 133–144)
SOURCE: ABNORMAL
SP GR UR STRIP: 1.01 (ref 1–1.03)
TRIGL SERPL-MCNC: 229 MG/DL
UROBILINOGEN UR STRIP-MCNC: NORMAL MG/DL (ref 0–2)
WBC # BLD AUTO: 8.2 10E9/L (ref 4–11)
WBC #/AREA URNS AUTO: 0 /HPF (ref 0–5)

## 2020-03-25 PROCEDURE — 36415 COLL VENOUS BLD VENIPUNCTURE: CPT | Performed by: FAMILY MEDICINE

## 2020-03-25 PROCEDURE — 80053 COMPREHEN METABOLIC PANEL: CPT | Performed by: FAMILY MEDICINE

## 2020-03-25 PROCEDURE — 80061 LIPID PANEL: CPT | Performed by: FAMILY MEDICINE

## 2020-03-25 PROCEDURE — 81001 URINALYSIS AUTO W/SCOPE: CPT | Performed by: FAMILY MEDICINE

## 2020-03-25 PROCEDURE — 85025 COMPLETE CBC W/AUTO DIFF WBC: CPT | Performed by: FAMILY MEDICINE

## 2020-08-17 NOTE — PROGRESS NOTES
SUBJECTIVE:   CC: Paulette Flores is an 49 year old woman who presents for preventive health visit.     Healthy Habits:    Do you get at least three servings of calcium containing foods daily (dairy, green leafy vegetables, etc.)? yes    Amount of exercise or daily activities, outside of work: everyday day(s) per week    Problems taking medications regularly No    Medication side effects: No    Have you had an eye exam in the past two years? yes    Do you see a dentist twice per year? yes    Do you have sleep apnea, excessive snoring or daytime drowsiness?yes, sleep apnea- uses cpap machine      Hyperlipidemia Follow-Up      Are you regularly taking any medication or supplement to lower your cholesterol?   Yes- smivastatin    Are you having muscle aches or other side effects that you think could be caused by your cholesterol lowering medication?  No    Hypertension Follow-up      Do you check your blood pressure regularly outside of the clinic? Yes     Are you following a low salt diet? Yes    Are your blood pressures ever more than 140 on the top number (systolic) OR more   than 90 on the bottom number (diastolic), for example 140/90? Yes  Musculoskeletal problem/pain      Duration: couple months    Description  Location: right arm/ elbow    Intensity:  moderate    Accompanying signs and symptoms: radiation of pain to lower and upper arm, numbness, tingling and swelling    History  Previous similar problem: no   Previous evaluation:  none    Precipitating or alleviating factors:  Trauma or overuse: no   Aggravating factors include: exercise and overuse    Therapies tried and outcome: nothing    Right handed     Did lots of work and kayaking     Lots of 4 wheeling     Pain over medial epicondyl region          Today's PHQ-2 Score:   PHQ-2 ( 1999 [employer]) 2/28/2019   Q1: Little interest or pleasure in doing things 0   Q2: Feeling down, depressed or hopeless 0   PHQ-2 Score 0       Abuse: Current or Past(Physical, Sexual  or Emotional)- No  Do you feel safe in your environment? Yes        Social History     Tobacco Use     Smoking status: Never Smoker     Smokeless tobacco: Never Used   Substance Use Topics     Alcohol use: No     Alcohol/week: 0.0 standard drinks     If you drink alcohol do you typically have >3 drinks per day or >7 drinks per week? No                     Reviewed orders with patient.  Reviewed health maintenance and updated orders accordingly - Yes  Labs reviewed in EPIC    Mammogram Screening: Patient over age 50, mutual decision to screen reflected in health maintenance.    Pertinent mammograms are reviewed under the imaging tab.  History of abnormal Pap smear: NO - age 30- 65 PAP every 3 years recommended  PAP / HPV Latest Ref Rng & Units 3/19/2019 10/13/2017 8/8/2013   PAP - NIL NIL NIL   HPV 16 DNA NEG:Negative Negative Negative -   HPV 18 DNA NEG:Negative Negative Negative -   OTHER HR HPV NEG:Negative Negative Negative -     Reviewed and updated as needed this visit by clinical staff         Reviewed and updated as needed this visit by Provider        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     Infections of genitourinary tract antepartum 05/27/2009     Metrorrhagia 12/06/2001     Migraine, unspecified, without mention of intractable migraine without mention of status migrainosus 02/23/2001    oral contraceptive related     Pain in limb 08/22/2002     Unspecified disorder of female genital organs 12/13/2006     Unspecified hemorrhage in early pregnancy, antepartum 12/04/2006      Past Surgical History:   Procedure Laterality Date     BREAST SURGERY       CHOLECYSTECTOMY       GYN SURGERY      laparoscopic supracervical hysterectomy,left tube and ovary removal,fulguration of endometriosis  "8/28/13--[facility] [place]     LAPAROSCOPIC HYSTERECTOMY SUPRACERVICAL, BILATERAL SALPINGO-OOPHORECTOMY, COMBINED  8/28/2013    Procedure: COMBINED LAPAROSCOPIC HYSTERECTOMY SUPRACERVICAL, SALPINGO-OOPHORECTOMY;  LAPAROSCOPIC SUPRACERVICAL HYSTERECTOMY/LAPAROSCOPIC LEFT SALPINGO-OOPHORECTOMY;  Surgeon: Jerrell Thornton MD;  Location: [facility]     LAPAROSCOPIC SALPINGO-OOPHORECTOMY Right 11/22/2017    Procedure: LAPAROSCOPIC SALPINGO-OOPHORECTOMY;  LAPAROSCOPIC RIGHT SALPINGO-OOPHORECTOMY excision of right pelvic side wall mass;  Surgeon: Jerrell Thornton MD;  Location: [facility]     LAPAROSCOPY DIAGNOSTIC (GENERAL)       WISDOM TEETH         ROS:  CONSTITUTIONAL: NEGATIVE for fever, chills, change in weight  INTEGUMENTARU/SKIN: NEGATIVE for worrisome rashes, moles or lesions  EYES: NEGATIVE for vision changes or irritation  ENT: NEGATIVE for ear, mouth and throat problems  RESP: NEGATIVE for significant cough or SOB  BREAST: NEGATIVE for masses, tenderness or discharge  CV: NEGATIVE for chest pain, palpitations or peripheral edema  GI: NEGATIVE for nausea, abdominal pain, heartburn, or change in bowel habits  : NEGATIVE for unusual urinary or vaginal symptoms. Periods are regular.  MUSCULOSKELETAL: NEGATIVE for significant arthralgias or myalgia  NEURO: NEGATIVE for weakness, dizziness or paresthesias  PSYCHIATRIC: NEGATIVE for changes in mood or affect    OBJECTIVE:   /80   Pulse 52   Temp 97.5  F (36.4  C)   Ht 1.74 m (5' 8.5\")   Wt 122.5 kg (270 lb)   LMP 08/18/2013   SpO2 96%   BMI 40.46 kg/m    EXAM:  GENERAL: healthy, alert and no distress  EYES: Eyes grossly normal to inspection, PERRL and conjunctivae and sclerae normal  HENT: ear canals and TM's normal, nose and mouth without ulcers or lesions  NECK: no adenopathy, no asymmetry, masses, or scars and thyroid normal to palpation  RESP: lungs clear to auscultation - no rales, rhonchi or wheezes  BREAST: normal without masses, tenderness or nipple " discharge and no palpable axillary masses or adenopathy  CV: regular rate and rhythm, normal S1 S2, no S3 or S4, no murmur, click or rub, no peripheral edema and peripheral pulses strong  ABDOMEN: soft, nontender, no hepatosplenomegaly, no masses and bowel sounds normal  MS: no gross musculoskeletal defects noted, no edema  SKIN: no suspicious lesions or rashes  NEURO: Normal strength and tone, mentation intact and speech normal  PSYCH: mentation appears normal, affect normal/bright    Results for orders placed or performed in visit on 08/26/20   Lipid Profile     Status: Abnormal   Result Value Ref Range    Cholesterol 170 <200 mg/dL    Triglycerides 233 (H) <150 mg/dL    HDL Cholesterol 40 (L) >49 mg/dL    LDL Cholesterol Calculated 83 <100 mg/dL    Non HDL Cholesterol 130 (H) <130 mg/dL   Comprehensive metabolic panel     Status: Abnormal   Result Value Ref Range    Sodium 136 133 - 144 mmol/L    Potassium 4.0 3.4 - 5.3 mmol/L    Chloride 105 94 - 109 mmol/L    Carbon Dioxide 28 20 - 32 mmol/L    Anion Gap 3 3 - 14 mmol/L    Glucose 111 (H) 70 - 99 mg/dL    Urea Nitrogen 16 7 - 30 mg/dL    Creatinine 0.77 0.52 - 1.04 mg/dL    GFR Estimate >90 >60 mL/min/[1.73_m2]    GFR Estimate If Black >90 >60 mL/min/[1.73_m2]    Calcium 9.0 8.5 - 10.1 mg/dL    Bilirubin Total 0.4 0.2 - 1.3 mg/dL    Albumin 4.1 3.4 - 5.0 g/dL    Protein Total 7.5 6.8 - 8.8 g/dL    Alkaline Phosphatase 60 40 - 150 U/L    ALT 50 0 - 50 U/L    AST 18 0 - 45 U/L   CBC with platelets differential     Status: None   Result Value Ref Range    WBC 7.4 4.0 - 11.0 10e9/L    RBC Count 4.50 3.8 - 5.2 10e12/L    Hemoglobin 13.3 11.7 - 15.7 g/dL    Hematocrit 40.2 35.0 - 47.0 %    MCV 89 78 - 100 fl    MCH 29.6 26.5 - 33.0 pg    MCHC 33.1 31.5 - 36.5 g/dL    RDW 13.3 10.0 - 15.0 %    Platelet Count 210 150 - 450 10e9/L    Diff Method Automated Method     % Neutrophils 62.2 %    % Lymphocytes 21.4 %    % Monocytes 9.9 %    % Eosinophils 5.4 %    % Basophils  0.7 %    % Immature Granulocytes 0.4 %    Nucleated RBCs 0 0 /100    Absolute Neutrophil 4.6 1.6 - 8.3 10e9/L    Absolute Lymphocytes 1.6 0.8 - 5.3 10e9/L    Absolute Monocytes 0.7 0.0 - 1.3 10e9/L    Absolute Eosinophils 0.4 0.0 - 0.7 10e9/L    Absolute Basophils 0.1 0.0 - 0.2 10e9/L    Abs Immature Granulocytes 0.0 0 - 0.4 10e9/L    Absolute Nucleated RBC 0.0          ASSESSMENT/PLAN:   1. Essential hypertension with goal blood pressure less than 140/90  Great control now. Continue current medications and behavioral changes.   - CBC with platelets differential; Future  - Comprehensive metabolic panel; Future  - Lipid Profile; Future  - valsartan-hydrochlorothiazide (DIOVAN HCT) 80-12.5 MG tablet; Take 1 tablet by mouth daily  Dispense: 90 tablet; Refill: 3    2. Mixed hyperlipidemia  Mild / better with statin. Continue current medications and behavioral changes.   - Comprehensive metabolic panel; Future  - Lipid Profile; Future  - simvastatin (ZOCOR) 20 MG tablet; Take 1 tablet (20 mg) by mouth At Bedtime  Dispense: 90 tablet; Refill: 3    3. FH: CAD (coronary artery disease)  No s/s of angina.   - Comprehensive metabolic panel; Future  - Lipid Profile; Future    4. Routine general medical examination at a health care facility  See back in a year. Mammogram planned for Sept.   - CBC with platelets differential; Future  - Comprehensive metabolic panel; Future  - Lipid Profile; Future    5. Medial epicondylitis of elbow, right  Discussed - will treat splint and PT. Handout given. Discussed in length conservative measures of OTC medications for pain, Ice/Heat, elevation and the concept of R.I.C.E.. Continue behavioral changes and proper body mechanics to allow for healing. Follow up as directed.   - TENNIS ELBOW SPLINT    COUNSELING:   Reviewed preventive health counseling, as reflected in patient instructions       Regular exercise       Healthy diet/nutrition    Estimated body mass index is 38.25 kg/m  as  "calculated from the following:    Height as of 12/3/19: 1.753 m (5' 9\").    Weight as of 12/3/19: 117.5 kg (259 lb).    Weight management plan: Discussed healthy diet and exercise guidelines     reports that she has never smoked. She has never used smokeless tobacco.      Counseling Resources:  ATP IV Guidelines  Pooled Cohorts Equation Calculator  Breast Cancer Risk Calculator  FRAX Risk Assessment  ICSI Preventive Guidelines  Dietary Guidelines for Americans, 2010  USDA's MyPlate  ASA Prophylaxis  Lung CA Screening    Manohar Villareal MD  [facility] [place]  "

## 2020-08-17 NOTE — PATIENT INSTRUCTIONS
Preventive Health Recommendations  Female Ages 40 to 49    Yearly exam:     See your health care provider every year in order to  1. Review health changes.   2. Discuss preventive care.    3. Review your medicines if your doctor prescribed any.      Get a Pap test every three years (unless you have an abnormal result and your provider advises testing more often).      If you get Pap tests with HPV test, you only need to test every 5 years, unless you have an abnormal result. You do not need a Pap test if your uterus was removed (hysterectomy) and you have not had cancer.      You should be tested each year for STDs (sexually transmitted diseases), if you're at risk.     Ask your doctor if you should have a mammogram.      Have a colonoscopy (test for colon cancer) if someone in your family has had colon cancer or polyps before age 50.       Have a cholesterol test every 5 years.       Have a diabetes test (fasting glucose) after age 45. If you are at risk for diabetes, you should have this test every 3 years.    Shots: Get a flu shot each year. Get a tetanus shot every 10 years.     Nutrition:     Eat at least 5 servings of fruits and vegetables each day.    Eat whole-grain bread, whole-wheat pasta and brown rice instead of white grains and rice.    Get adequate Calcium and Vitamin D.      Lifestyle    Exercise at least 150 minutes a week (an average of 30 minutes a day, 5 days a week). This will help you control your weight and prevent disease.    Limit alcohol to one drink per day.    No smoking.     Wear sunscreen to prevent skin cancer.    See your dentist every six months for an exam and cleaning.  Patient Education     Understanding Medial Epicondylitis    Several muscles attach to the arm at the elbow joint. The tough bands of tissue that attach muscle to bones are called tendons. The bone in the upper arm has knobs on the farthest end called epicondyles. Tendons attach some arm muscles to these knobs. The  tissues in this area can become irritated.  Epicondylitis is the medical term for a painful elbow over the epicondyle. Medial refers to the inner side of the elbow. Medial epicondylitis is sometimes called  golfer s elbow.      How to say it  BO-nabil-roseline [id number]   Causes of medial epicondylitis  A painful inner elbow may be caused by:  Using an elbow or hand the same way over and over  Using poor form or too much force in a sport such as golf, tennis, or baseball  Lifting too heavy a weight  Other injuries to the arm or elbow  Symptoms of medial epicondylitis  Pain or tenderness on the inside of the elbow that may travel down the forearm  Pain when moving the wrist  Pain or weakness when gripping something  A crackling sound or grating feeling when moving the elbow  Treatment for medial epicondylitis  Treatments may include:  Avoiding or changing the action that caused the problem. This helps prevent irritating the tissues more.  Prescription or over-the-counter pain medicines. These help reduce inflammation, swelling, and pain.  Cold or heat packs. These help reduce pain and swelling.  Stretching and other exercises. These improve flexibility and strength.  Physical therapy. This may include exercises or other treatments.  Injections of medicine. This may relieve symptoms.  If other treatments do not relieve symptoms, you may need surgery.  Possible complications  If you don t give your elbow time to heal, symptoms may return or get worse. Follow your healthcare provider s instructions on resting and treating your elbow.     When to call your healthcare provider  Call your healthcare provider right away if you have any of these:  Fever of 100.4 F (38 C) or higher, or as directed  Redness, swelling, or warmth that gets worse  Symptoms that don t get better with prescribed medicines, or get worse  New symptoms   Date Last Reviewed: 3/10/2016    7171-2240 [employer]. [address], [place] [address]. All rights reserved. This information is not intended as a substitute for professional medical care. Always follow your healthcare professional's instructions.           Patient Education     Tennis Elbow  Muscles connect to bones by thick, fibrous cords (tendons). When the muscles are overused by repeated motion, the tendons may become inflamed and painful. This condition is called tendonitis.  Tennis elbow (lateral epicondylitis) is a form of tendonitis. It occurs when the forearm muscles are used again and again in a twisting motion. Pain from tennis elbow occurs mainly on the outside of the elbow. But the pain can spread into the forearm and wrist. Your elbow may also be swollen and tender to the touch.  The pain may get worse when you move your arm or do simple activities. Bending your wrist back, shaking hands, or turning a doorknob may cause pain. The pain often gets worse after several weeks or months. Sometimes you may feel pain when your arm is still.  Tennis players who use a backhand stroke with poor technique are more likely to get tennis elbow. But playing tennis is only one cause of tennis elbow. Other common activities that can cause it include:  Hammering  Painting  Raking  Besides tennis players, people at risk include , gardeners, musicians, and dentists. Sometimes people get tennis elbow without doing anything that would cause the injury.  Treatment includes resting the arm and taking anti-inflammatory medicines. Special splints can help ease symptoms. Symptoms should get better after 4 to 6 weeks of rest. You may need steroid injections if resting and using a splint don t help. After the pain is relieved, you should change your activities so the symptoms don t return. You may need physical therapy. It may include stretching, range-of-motion, and strengthening exercises. These treatments help most cases. You may need surgery if your symptoms continue for 6  months despite treatment.  Home care  Follow these guidelines when caring for yourself at home:  Rest your elbow as needed. Protect it from movement that causes pain. You may be told to use a forearm splint at night to ease symptoms in the morning. Your healthcare provider may recommend a special wrap or splint to compress the muscles of the forearm. This can ease pain during daytime activities. As your symptoms get better, start to move your elbow more.  Put an ice pack on the injured area. Do this for 20 minutes every 1 to 2 hours the first day for pain relief. You can make an ice pack by wrapping a plastic bag of ice cubes in a thin towel. Continue using the ice pack 3 to 4 times a day for the next several days. Then use the ice pack as needed to ease pain and swelling.  You may use acetaminophen or ibuprofen to control pain, unless another pain medicine was prescribed. If you have chronic liver or kidney disease, talk with your healthcare provider before using these medicines. Also talk with your provider if you ve had a stomach ulcer or gastrointestinal bleeding.  After your elbow heals, avoid the motion that caused your pain. Or learn to move in a way that causes less stress on the tendon. Using a forearm wrap may keep tennis elbow from happening again.  A tennis elbow strap may ease pain and keep you from further injury when you start playing tennis again. You can also lower your risk for injury by warming up before you play and cooling down afterward. You should also use the right equipment. For instance, make sure your racquet has the right  and is the right size for you.  Follow-up care  Follow up with your healthcare provider, or as advised, if your symptoms don t get better after 2 to 3 weeks of treatment.  When to seek medical advice  Call your healthcare provider right away if any of these occur:  Redness over the painful area  Pain, stiffness,  or swelling at the elbow gets worse  Any numbness or  tingling in your arm, hands, or fingers  Unexplained fever over 100.4 F (38 C)   Date Last Reviewed: 5/1/2017 2000-2019 [employer]. [address] [place] [id number]. All rights reserved. This information is not intended as a substitute for professional medical care. Always follow your healthcare professional's instructions.           Patient Education     Treating Tennis Elbow    Your treatment will depend on how inflamed your tendon is. The goal is to relieve your symptoms and help you regain full use of your elbow.  Rest and medicine  Wearing a tennis elbow splint allows the inflamed tendon to rest. It must be worn properly. It should be placed down the arm past the painful area of the elbow. If it is directly over the inflamed tendon, it can worsen the symptoms. This brace can help the tendon heal. Using your other hand or changing your  also takes stress off the tendon. Oral nonsteroidal anti-inflammatory medicines (NSAIDs) and ice can relieve pain and reduce swelling.  Exercises and therapy  Your healthcare provider may give you an exercise program. He or she may refer you to a physical therapist. The physical therapist will teach you how to gently stretch and strengthen the muscles around your elbow.  Anti-inflammatory injections  Your healthcare provider may give you injections of an anti-inflammatory medicine, such as cortisone. This helps reduce swelling. You may have more pain at first. But in a few days, your elbow should feel better.  If surgery is needed  If your symptoms persist for a long time, or other treatments don t work, your healthcare provider may recommend surgery. Surgery repairs the inflamed tendon.  Date Last Reviewed: 1/1/2018 2000-2019 [employer]. [address] [place] [id number]. All rights reserved. This information is not intended as a substitute for professional medical care. Always follow your healthcare professional's  instructions.           Results for orders placed or performed in visit on 08/26/20   Lipid Profile     Status: Abnormal   Result Value Ref Range    Cholesterol 170 <200 mg/dL    Triglycerides 233 (H) <150 mg/dL    HDL Cholesterol 40 (L) >49 mg/dL    LDL Cholesterol Calculated 83 <100 mg/dL    Non HDL Cholesterol 130 (H) <130 mg/dL   Comprehensive metabolic panel     Status: Abnormal   Result Value Ref Range    Sodium 136 133 - 144 mmol/L    Potassium 4.0 3.4 - 5.3 mmol/L    Chloride 105 94 - 109 mmol/L    Carbon Dioxide 28 20 - 32 mmol/L    Anion Gap 3 3 - 14 mmol/L    Glucose 111 (H) 70 - 99 mg/dL    Urea Nitrogen 16 7 - 30 mg/dL    Creatinine 0.77 0.52 - 1.04 mg/dL    GFR Estimate >90 >60 mL/min/[1.73_m2]    GFR Estimate If Black >90 >60 mL/min/[1.73_m2]    Calcium 9.0 8.5 - 10.1 mg/dL    Bilirubin Total 0.4 0.2 - 1.3 mg/dL    Albumin 4.1 3.4 - 5.0 g/dL    Protein Total 7.5 6.8 - 8.8 g/dL    Alkaline Phosphatase 60 40 - 150 U/L    ALT 50 0 - 50 U/L    AST 18 0 - 45 U/L   CBC with platelets differential     Status: None   Result Value Ref Range    WBC 7.4 4.0 - 11.0 10e9/L    RBC Count 4.50 3.8 - 5.2 10e12/L    Hemoglobin 13.3 11.7 - 15.7 g/dL    Hematocrit 40.2 35.0 - 47.0 %    MCV 89 78 - 100 fl    MCH 29.6 26.5 - 33.0 pg    MCHC 33.1 31.5 - 36.5 g/dL    RDW 13.3 10.0 - 15.0 %    Platelet Count 210 150 - 450 10e9/L    Diff Method Automated Method     % Neutrophils 62.2 %    % Lymphocytes 21.4 %    % Monocytes 9.9 %    % Eosinophils 5.4 %    % Basophils 0.7 %    % Immature Granulocytes 0.4 %    Nucleated RBCs 0 0 /100    Absolute Neutrophil 4.6 1.6 - 8.3 10e9/L    Absolute Lymphocytes 1.6 0.8 - 5.3 10e9/L    Absolute Monocytes 0.7 0.0 - 1.3 10e9/L    Absolute Eosinophils 0.4 0.0 - 0.7 10e9/L    Absolute Basophils 0.1 0.0 - 0.2 10e9/L    Abs Immature Granulocytes 0.0 0 - 0.4 10e9/L    Absolute Nucleated RBC 0.0

## 2020-08-26 ENCOUNTER — OFFICE VISIT (OUTPATIENT)
Dept: FAMILY MEDICINE | Facility: OTHER | Age: 50
End: 2020-08-26
Attending: FAMILY MEDICINE
Payer: COMMERCIAL

## 2020-08-26 VITALS
HEART RATE: 52 BPM | DIASTOLIC BLOOD PRESSURE: 80 MMHG | OXYGEN SATURATION: 96 % | TEMPERATURE: 97.5 F | WEIGHT: 270 LBS | BODY MASS INDEX: 39.99 KG/M2 | HEIGHT: 69 IN | SYSTOLIC BLOOD PRESSURE: 118 MMHG

## 2020-08-26 DIAGNOSIS — I10 ESSENTIAL HYPERTENSION WITH GOAL BLOOD PRESSURE LESS THAN 140/90: ICD-10-CM

## 2020-08-26 DIAGNOSIS — I10 ESSENTIAL HYPERTENSION WITH GOAL BLOOD PRESSURE LESS THAN 140/90: Primary | ICD-10-CM

## 2020-08-26 DIAGNOSIS — Z00.00 ROUTINE GENERAL MEDICAL EXAMINATION AT A HEALTH CARE FACILITY: ICD-10-CM

## 2020-08-26 DIAGNOSIS — E78.2 MIXED HYPERLIPIDEMIA: ICD-10-CM

## 2020-08-26 DIAGNOSIS — Z82.49 FH: CAD (CORONARY ARTERY DISEASE): ICD-10-CM

## 2020-08-26 DIAGNOSIS — M77.01 MEDIAL EPICONDYLITIS OF ELBOW, RIGHT: ICD-10-CM

## 2020-08-26 LAB
ALBUMIN SERPL-MCNC: 4.1 G/DL (ref 3.4–5)
ALP SERPL-CCNC: 60 U/L (ref 40–150)
ALT SERPL W P-5'-P-CCNC: 50 U/L (ref 0–50)
ANION GAP SERPL CALCULATED.3IONS-SCNC: 3 MMOL/L (ref 3–14)
AST SERPL W P-5'-P-CCNC: 18 U/L (ref 0–45)
BASOPHILS # BLD AUTO: 0.1 10E9/L (ref 0–0.2)
BASOPHILS NFR BLD AUTO: 0.7 %
BILIRUB SERPL-MCNC: 0.4 MG/DL (ref 0.2–1.3)
BUN SERPL-MCNC: 16 MG/DL (ref 7–30)
CALCIUM SERPL-MCNC: 9 MG/DL (ref 8.5–10.1)
CHLORIDE SERPL-SCNC: 105 MMOL/L (ref 94–109)
CHOLEST SERPL-MCNC: 170 MG/DL
CO2 SERPL-SCNC: 28 MMOL/L (ref 20–32)
CREAT SERPL-MCNC: 0.77 MG/DL (ref 0.52–1.04)
DIFFERENTIAL METHOD BLD: NORMAL
EOSINOPHIL # BLD AUTO: 0.4 10E9/L (ref 0–0.7)
EOSINOPHIL NFR BLD AUTO: 5.4 %
ERYTHROCYTE [DISTWIDTH] IN BLOOD BY AUTOMATED COUNT: 13.3 % (ref 10–15)
GFR SERPL CREATININE-BSD FRML MDRD: >90 ML/MIN/{1.73_M2}
GLUCOSE SERPL-MCNC: 111 MG/DL (ref 70–99)
HCT VFR BLD AUTO: 40.2 % (ref 35–47)
HDLC SERPL-MCNC: 40 MG/DL
HGB BLD-MCNC: 13.3 G/DL (ref 11.7–15.7)
IMM GRANULOCYTES # BLD: 0 10E9/L (ref 0–0.4)
IMM GRANULOCYTES NFR BLD: 0.4 %
LDLC SERPL CALC-MCNC: 83 MG/DL
LYMPHOCYTES # BLD AUTO: 1.6 10E9/L (ref 0.8–5.3)
LYMPHOCYTES NFR BLD AUTO: 21.4 %
MCH RBC QN AUTO: 29.6 PG (ref 26.5–33)
MCHC RBC AUTO-ENTMCNC: 33.1 G/DL (ref 31.5–36.5)
MCV RBC AUTO: 89 FL (ref 78–100)
MONOCYTES # BLD AUTO: 0.7 10E9/L (ref 0–1.3)
MONOCYTES NFR BLD AUTO: 9.9 %
NEUTROPHILS # BLD AUTO: 4.6 10E9/L (ref 1.6–8.3)
NEUTROPHILS NFR BLD AUTO: 62.2 %
NONHDLC SERPL-MCNC: 130 MG/DL
NRBC # BLD AUTO: 0 10*3/UL
NRBC BLD AUTO-RTO: 0 /100
PLATELET # BLD AUTO: 210 10E9/L (ref 150–450)
POTASSIUM SERPL-SCNC: 4 MMOL/L (ref 3.4–5.3)
PROT SERPL-MCNC: 7.5 G/DL (ref 6.8–8.8)
RBC # BLD AUTO: 4.5 10E12/L (ref 3.8–5.2)
SODIUM SERPL-SCNC: 136 MMOL/L (ref 133–144)
TRIGL SERPL-MCNC: 233 MG/DL
WBC # BLD AUTO: 7.4 10E9/L (ref 4–11)

## 2020-08-26 PROCEDURE — 36415 COLL VENOUS BLD VENIPUNCTURE: CPT | Performed by: FAMILY MEDICINE

## 2020-08-26 PROCEDURE — 80061 LIPID PANEL: CPT | Performed by: FAMILY MEDICINE

## 2020-08-26 PROCEDURE — 99396 PREV VISIT EST AGE 40-64: CPT | Performed by: FAMILY MEDICINE

## 2020-08-26 PROCEDURE — 85025 COMPLETE CBC W/AUTO DIFF WBC: CPT | Performed by: FAMILY MEDICINE

## 2020-08-26 PROCEDURE — 80053 COMPREHEN METABOLIC PANEL: CPT | Performed by: FAMILY MEDICINE

## 2020-08-26 RX ORDER — SIMVASTATIN 20 MG
20 TABLET ORAL AT BEDTIME
Qty: 90 TABLET | Refills: 3 | Status: SHIPPED | OUTPATIENT
Start: 2020-08-26 | End: 2021-05-05

## 2020-08-26 RX ORDER — VALSARTAN AND HYDROCHLOROTHIAZIDE 80; 12.5 MG/1; MG/1
1 TABLET, FILM COATED ORAL DAILY
Qty: 90 TABLET | Refills: 3 | Status: SHIPPED | OUTPATIENT
Start: 2020-08-26 | End: 2021-09-07

## 2020-08-26 ASSESSMENT — MIFFLIN-ST. JEOR: SCORE: 1911.15

## 2020-08-26 ASSESSMENT — ANXIETY QUESTIONNAIRES
GAD7 TOTAL SCORE: 0
2. NOT BEING ABLE TO STOP OR CONTROL WORRYING: NOT AT ALL
1. FEELING NERVOUS, ANXIOUS, OR ON EDGE: NOT AT ALL
7. FEELING AFRAID AS IF SOMETHING AWFUL MIGHT HAPPEN: NOT AT ALL
3. WORRYING TOO MUCH ABOUT DIFFERENT THINGS: NOT AT ALL
6. BECOMING EASILY ANNOYED OR IRRITABLE: NOT AT ALL
5. BEING SO RESTLESS THAT IT IS HARD TO SIT STILL: NOT AT ALL
4. TROUBLE RELAXING: NOT AT ALL

## 2020-08-26 ASSESSMENT — PATIENT HEALTH QUESTIONNAIRE - PHQ9: SUM OF ALL RESPONSES TO PHQ QUESTIONS 1-9: 2

## 2020-08-26 ASSESSMENT — PAIN SCALES - GENERAL: PAINLEVEL: EXTREME PAIN (8)

## 2020-08-26 NOTE — NURSING NOTE
"Chief Complaint   Patient presents with     Physical       Initial /80   Pulse 52   Temp 97.5  F (36.4  C)   Ht 1.74 m (5' 8.5\")   Wt 122.5 kg (270 lb)   LMP 08/18/2013   SpO2 96%   BMI 40.46 kg/m   Estimated body mass index is 40.46 kg/m  as calculated from the following:    Height as of this encounter: 1.74 m (5' 8.5\").    Weight as of this encounter: 122.5 kg (270 lb).  Medication Reconciliation: complete  Wilber Resendez LPN  "

## 2020-08-27 ASSESSMENT — ANXIETY QUESTIONNAIRES: GAD7 TOTAL SCORE: 0

## 2020-08-31 ENCOUNTER — HOSPITAL ENCOUNTER (OUTPATIENT)
Dept: PHYSICAL THERAPY | Facility: HOSPITAL | Age: 50
Setting detail: THERAPIES SERIES
End: 2020-08-31
Attending: FAMILY MEDICINE
Payer: COMMERCIAL

## 2020-08-31 DIAGNOSIS — M77.01 MEDIAL EPICONDYLITIS OF ELBOW, RIGHT: ICD-10-CM

## 2020-08-31 PROCEDURE — 97033 APP MDLTY 1+IONTPHRSIS EA 15: CPT | Mod: GP

## 2020-08-31 PROCEDURE — 97161 PT EVAL LOW COMPLEX 20 MIN: CPT | Mod: GP

## 2020-08-31 PROCEDURE — 97110 THERAPEUTIC EXERCISES: CPT | Mod: GP
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
## 2020-09-02 ENCOUNTER — HOSPITAL ENCOUNTER (OUTPATIENT)
Dept: PHYSICAL THERAPY | Facility: HOSPITAL | Age: 50
Setting detail: THERAPIES SERIES
End: 2020-09-02
Attending: FAMILY MEDICINE
Payer: COMMERCIAL

## 2020-09-02 PROCEDURE — 97110 THERAPEUTIC EXERCISES: CPT | Mod: GP

## 2020-09-02 PROCEDURE — 97033 APP MDLTY 1+IONTPHRSIS EA 15: CPT | Mod: GP

## 2020-09-03 ENCOUNTER — TRANSFERRED RECORDS (OUTPATIENT)
Dept: HEALTH INFORMATION MANAGEMENT | Facility: CLINIC | Age: 50
End: 2020-09-03

## 2020-09-03 LAB — RETINOPATHY: NORMAL

## 2020-09-09 ENCOUNTER — ANCILLARY PROCEDURE (OUTPATIENT)
Dept: MAMMOGRAPHY | Facility: OTHER | Age: 50
End: 2020-09-09
Attending: FAMILY MEDICINE
Payer: COMMERCIAL

## 2020-09-09 ENCOUNTER — HOSPITAL ENCOUNTER (OUTPATIENT)
Dept: PHYSICAL THERAPY | Facility: HOSPITAL | Age: 50
Setting detail: THERAPIES SERIES
End: 2020-09-09
Attending: FAMILY MEDICINE
Payer: COMMERCIAL

## 2020-09-09 DIAGNOSIS — Z12.31 VISIT FOR SCREENING MAMMOGRAM: ICD-10-CM

## 2020-09-09 PROCEDURE — 97140 MANUAL THERAPY 1/> REGIONS: CPT | Mod: GP

## 2020-09-09 PROCEDURE — 77063 BREAST TOMOSYNTHESIS BI: CPT | Mod: TC

## 2020-09-09 PROCEDURE — 97033 APP MDLTY 1+IONTPHRSIS EA 15: CPT | Mod: GP

## 2020-09-09 PROCEDURE — 77067 SCR MAMMO BI INCL CAD: CPT | Mod: TC

## 2020-09-11 ENCOUNTER — HOSPITAL ENCOUNTER (OUTPATIENT)
Dept: PHYSICAL THERAPY | Facility: HOSPITAL | Age: 50
Setting detail: THERAPIES SERIES
End: 2020-09-11
Attending: FAMILY MEDICINE
Payer: COMMERCIAL

## 2020-09-11 PROCEDURE — 97140 MANUAL THERAPY 1/> REGIONS: CPT | Mod: GP

## 2020-09-11 PROCEDURE — 97033 APP MDLTY 1+IONTPHRSIS EA 15: CPT | Mod: GP

## 2020-09-16 ENCOUNTER — HOSPITAL ENCOUNTER (OUTPATIENT)
Dept: PHYSICAL THERAPY | Facility: HOSPITAL | Age: 50
Setting detail: THERAPIES SERIES
End: 2020-09-16
Attending: FAMILY MEDICINE
Payer: COMMERCIAL

## 2020-09-16 PROCEDURE — 97140 MANUAL THERAPY 1/> REGIONS: CPT | Mod: GP

## 2020-09-16 PROCEDURE — 97033 APP MDLTY 1+IONTPHRSIS EA 15: CPT | Mod: GP

## 2020-09-18 ENCOUNTER — HOSPITAL ENCOUNTER (OUTPATIENT)
Dept: PHYSICAL THERAPY | Facility: HOSPITAL | Age: 50
Setting detail: THERAPIES SERIES
End: 2020-09-18
Attending: FAMILY MEDICINE
Payer: COMMERCIAL

## 2020-09-18 PROCEDURE — 97033 APP MDLTY 1+IONTPHRSIS EA 15: CPT | Mod: GP

## 2020-09-18 PROCEDURE — 97140 MANUAL THERAPY 1/> REGIONS: CPT | Mod: GP

## 2020-09-29 ENCOUNTER — OFFICE VISIT (OUTPATIENT)
Dept: OBGYN | Facility: OTHER | Age: 50
End: 2020-09-29
Attending: NURSE PRACTITIONER
Payer: COMMERCIAL

## 2020-09-29 VITALS
HEART RATE: 69 BPM | HEIGHT: 69 IN | WEIGHT: 270 LBS | SYSTOLIC BLOOD PRESSURE: 116 MMHG | BODY MASS INDEX: 39.99 KG/M2 | OXYGEN SATURATION: 97 % | DIASTOLIC BLOOD PRESSURE: 73 MMHG

## 2020-09-29 DIAGNOSIS — N94.9 VAGINAL LUMP: Primary | ICD-10-CM

## 2020-09-29 PROCEDURE — 99213 OFFICE O/P EST LOW 20 MIN: CPT | Performed by: NURSE PRACTITIONER

## 2020-09-29 ASSESSMENT — MIFFLIN-ST. JEOR: SCORE: 1911.15

## 2020-09-29 ASSESSMENT — PAIN SCALES - GENERAL: PAINLEVEL: NO PAIN (0)

## 2020-09-29 NOTE — PROGRESS NOTES
SUBJECTIVE:     HPI: MISSY is a 49 year old  female who presents today for evaluation of a vaginal lump. Reports noticing the lump to left labia yesterday with mild discomfort. Denies any drainage. Denies fevers. Has not tried any OTC medications or home remedies.     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     Infections of genitourinary tract antepartum 05/27/2009     Metrorrhagia 12/06/2001     Migraine, unspecified, without mention of intractable migraine without mention of status migrainosus 02/23/2001    oral contraceptive related     Pain in limb 08/22/2002     Unspecified disorder of female genital organs 12/13/2006     Unspecified hemorrhage in early pregnancy, antepartum 12/04/2006     Past Surgical History:   Procedure Laterality Date     BREAST SURGERY       CHOLECYSTECTOMY       GYN SURGERY      laparoscopic supracervical hysterectomy,left tube and ovary removal,fulguration of endometriosis 8/28/13--[facility] [place]     LAPAROSCOPIC HYSTERECTOMY SUPRACERVICAL, BILATERAL SALPINGO-OOPHORECTOMY, COMBINED  8/28/2013    Procedure: COMBINED LAPAROSCOPIC HYSTERECTOMY SUPRACERVICAL, SALPINGO-OOPHORECTOMY;  LAPAROSCOPIC SUPRACERVICAL HYSTERECTOMY/LAPAROSCOPIC LEFT SALPINGO-OOPHORECTOMY;  Surgeon: Jerrell Thornton MD;  Location: [facility]     LAPAROSCOPIC SALPINGO-OOPHORECTOMY Right 11/22/2017    Procedure: LAPAROSCOPIC SALPINGO-OOPHORECTOMY;  LAPAROSCOPIC RIGHT SALPINGO-OOPHORECTOMY excision of right pelvic side wall mass;  Surgeon: Jerrell Thornton MD;  Location: [facility]     LAPAROSCOPY DIAGNOSTIC (GENERAL)       WISDOM TEETH         Review Of Systems    General: Negative for fevers.   Genitourinary: Positive for vaginal lump with mild discomfort. Negative for abnormal vaginal discharge.  "      OBJECTIVE:    /73 (BP Location: Left arm, Patient Position: Sitting, Cuff Size: Adult Large)   Pulse 69   Ht 1.74 m (5' 8.5\")   Wt 122.5 kg (270 lb)   LMP 08/18/2013   SpO2 97%   BMI 40.46 kg/m       Genitourinary: 3mm lump noted to left labia without any erythema or swelling to area. Negative for any lesions.     ASSESSMENT:    3mm vaginal lump without any erythema, swelling or drainage.       PLAN:    Continue to monitor at this time. Take warm baths twice daily, and may also use Epsom salt. Avoid bubble bath products.  "

## 2020-09-29 NOTE — NURSING NOTE
"Chief Complaint   Patient presents with     Vaginal Problem     Lump       Initial /73 (BP Location: Left arm, Patient Position: Sitting, Cuff Size: Adult Large)   Pulse 69   Ht 1.74 m (5' 8.5\")   Wt 122.5 kg (270 lb)   LMP 08/18/2013   SpO2 97%   BMI 40.46 kg/m   Estimated body mass index is 40.46 kg/m  as calculated from the following:    Height as of this encounter: 1.74 m (5' 8.5\").    Weight as of this encounter: 122.5 kg (270 lb).  Medication Reconciliation: complete       Chel Fontenot, JESSEE    "

## 2020-09-30 ENCOUNTER — HOSPITAL ENCOUNTER (OUTPATIENT)
Dept: PHYSICAL THERAPY | Facility: HOSPITAL | Age: 50
Setting detail: THERAPIES SERIES
End: 2020-09-30
Attending: FAMILY MEDICINE
Payer: COMMERCIAL

## 2020-09-30 PROCEDURE — 40000185 ZZHC STATISTIC PT OUTPT VISIT

## 2020-09-30 NOTE — PROGRESS NOTES
Outpatient Physical Therapy Discharge Note     Patient: Paulette Flores  : 1970    Beginning/End Dates of Reporting Period:  20 to 2020    Referring Provider: Dr. Villareal    Therapy Diagnosis: Tennis Elbow     Client Self Report: Patient doing very well, now 1 full week w/o elbow pain.     Objective Measurements:  Wrist AROM full w/o pain    Outcome Measures (most recent score):  NT    Goals:  All Goals Met     Plan:  Discharge from therapy.    Discharge:  Discharge from therapy home to continue working on HEP 2x per week over period of 2 months w/ education on preventing future flare ups.

## 2020-10-30 ENCOUNTER — ALLIED HEALTH/NURSE VISIT (OUTPATIENT)
Dept: FAMILY MEDICINE | Facility: OTHER | Age: 50
End: 2020-10-30
Attending: FAMILY MEDICINE
Payer: COMMERCIAL

## 2020-10-30 DIAGNOSIS — Z23 NEED FOR PROPHYLACTIC VACCINATION AND INOCULATION AGAINST INFLUENZA: Primary | ICD-10-CM

## 2020-10-30 PROCEDURE — 90471 IMMUNIZATION ADMIN: CPT

## 2020-10-30 PROCEDURE — 90686 IIV4 VACC NO PRSV 0.5 ML IM: CPT

## 2021-01-25 DIAGNOSIS — G47.33 OSA (OBSTRUCTIVE SLEEP APNEA): Primary | ICD-10-CM

## 2021-02-03 ENCOUNTER — OFFICE VISIT (OUTPATIENT)
Dept: CHIROPRACTIC MEDICINE | Facility: OTHER | Age: 51
End: 2021-02-03
Attending: CHIROPRACTOR
Payer: COMMERCIAL

## 2021-02-03 DIAGNOSIS — M99.02 SEGMENTAL AND SOMATIC DYSFUNCTION OF THORACIC REGION: ICD-10-CM

## 2021-02-03 DIAGNOSIS — M99.03 SEGMENTAL AND SOMATIC DYSFUNCTION OF LUMBAR REGION: Primary | ICD-10-CM

## 2021-02-03 DIAGNOSIS — M54.50 ACUTE BILATERAL LOW BACK PAIN WITHOUT SCIATICA: ICD-10-CM

## 2021-02-03 DIAGNOSIS — M99.01 SEGMENTAL AND SOMATIC DYSFUNCTION OF CERVICAL REGION: ICD-10-CM

## 2021-02-03 PROCEDURE — 98941 CHIROPRACT MANJ 3-4 REGIONS: CPT | Mod: AT | Performed by: CHIROPRACTOR

## 2021-02-03 PROCEDURE — 99212 OFFICE O/P EST SF 10 MIN: CPT | Mod: 25 | Performed by: CHIROPRACTOR

## 2021-02-11 NOTE — DISCHARGE INSTRUCTIONS
Post-Anesthesia Patient Instructions    IMMEDIATELY FOLLOWING SURGERY:  Do not drive or operate machinery for the first twenty four hours after surgery.  Do not make any important decisions for twenty four hours after surgery or while taking narcotic pain medications or sedatives.  If you develop intractable nausea and vomiting or a severe headache please notify your doctor immediately.    FOLLOW-UP:  Please make an appointment with your surgeon as instructed. You do not need to follow up with anesthesia unless specifically instructed to do so.    WOUND CARE INSTRUCTIONS (if applicable):  Keep a dry clean dressing on the anesthesia/puncture wound site if there is drainage.  Once the wound has quit draining you may leave it open to air.  Generally you should leave the bandage intact for twenty four hours unless there is drainage.  If the epidural site drains for more than 36-48 hours please call the anesthesia department.    QUESTIONS?:  Please feel free to call your physician or the hospital  if you have any questions, and they will be happy to assist you.   Call MD prior to DC.  No driving today or while on pain meds  Pelvic rest for 2 weeks  Call Dr. Thornton [phone] as necessary if problems in interim, no post op appt needed.  No heavy lifting, vigorous activity, swim, bath, exercise for 2 weeks   Detail Level: Detailed Quality 130: Documentation Of Current Medications In The Medical Record: Current Medications Documented Quality 111:Pneumonia Vaccination Status For Older Adults: Pneumococcal Vaccination not Administered or Previously Received, Reason not Otherwise Specified Quality 226: Preventive Care And Screening: Tobacco Use: Screening And Cessation Intervention: Patient screened for tobacco use and is an ex/non-smoker

## 2021-04-18 ENCOUNTER — HEALTH MAINTENANCE LETTER (OUTPATIENT)
Age: 51
End: 2021-04-18

## 2021-07-16 ENCOUNTER — TELEPHONE (OUTPATIENT)
Dept: FAMILY MEDICINE | Facility: OTHER | Age: 51
End: 2021-07-16

## 2021-07-16 NOTE — TELEPHONE ENCOUNTER
2:24 PM    Reason for Call: OVERBOOK    Patient is having the following symptoms: I want 4 skin tags removed.  1 on my chest has turned color from skin color to black./mychart request    The patient is requesting an appointment for 07-26-07-30 with Dr Villareal.    Was an appointment offered for this call? No  If yes : Appointment type              Date    Preferred method for responding to this message: Telephone Call  What is your phone number ?    If we cannot reach you directly, may we leave a detailed response at the number you provided? No    Can this message wait until your PCP/provider returns, if unavailable today? Not applicable  Maria Esther Hernandez

## 2021-07-19 NOTE — PROGRESS NOTES
"    Assessment & Plan     Skin tag  29 removed. Many still left but pt happy with ones we removed. Several -2 of them needed local anesthesia. Wound care instructions given. Follow-up prn.   - REMOVAL OF SKIN TAGS, FIRST 15  - REMOVAL OF SKIN TAGS, EA ADDTL 10             BMI:   Estimated body mass index is 42.55 kg/m  as calculated from the following:    Height as of this encounter: 1.74 m (5' 8.5\").    Weight as of this encounter: 128.8 kg (284 lb).         No follow-ups on file.    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 is a 50 year old who presents for the following health issues     HPI     Skin Lesion  Onset/Duration: years  Description  Location: chest, neck  Color: skin colored and black  Border description: classic small skin tags  Character:   Itching: no  Bleeding:  no  Intensity:  moderate  Progression of Symptoms:  same  Accompanying signs and symptoms:   Bleeding: no  Scaling: no  Excessive sun exposure/tanning: no  Sunscreen used: no  History:           Any previous history of skin cancer: no  Any family history of melanoma: no  Previous episodes of similar lesion: no  Precipitating or alleviating factors: clothing pulls on them  Therapies tried and outcome: none  Has lots of skin tags  Mostly several on chest and neck 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  Physical Exam   GENERAL: healthy, alert and no distress  SKIN: many skin tags noted on neck and chest and under breasts. All benign looking               "

## 2021-07-22 ENCOUNTER — OFFICE VISIT (OUTPATIENT)
Dept: CHIROPRACTIC MEDICINE | Facility: OTHER | Age: 51
End: 2021-07-22
Attending: CHIROPRACTOR
Payer: COMMERCIAL

## 2021-07-22 DIAGNOSIS — M54.50 ACUTE BILATERAL LOW BACK PAIN WITHOUT SCIATICA: ICD-10-CM

## 2021-07-22 DIAGNOSIS — M99.02 SEGMENTAL AND SOMATIC DYSFUNCTION OF THORACIC REGION: ICD-10-CM

## 2021-07-22 DIAGNOSIS — M99.03 SEGMENTAL AND SOMATIC DYSFUNCTION OF LUMBAR REGION: Primary | ICD-10-CM

## 2021-07-22 DIAGNOSIS — M99.01 SEGMENTAL AND SOMATIC DYSFUNCTION OF CERVICAL REGION: ICD-10-CM

## 2021-07-22 PROCEDURE — 98941 CHIROPRACT MANJ 3-4 REGIONS: CPT | Mod: AT | Performed by: CHIROPRACTOR

## 2021-07-26 ENCOUNTER — OFFICE VISIT (OUTPATIENT)
Dept: FAMILY MEDICINE | Facility: OTHER | Age: 51
End: 2021-07-26
Attending: FAMILY MEDICINE
Payer: COMMERCIAL

## 2021-07-26 VITALS
HEART RATE: 65 BPM | HEIGHT: 69 IN | BODY MASS INDEX: 42.06 KG/M2 | OXYGEN SATURATION: 97 % | WEIGHT: 284 LBS | DIASTOLIC BLOOD PRESSURE: 78 MMHG | SYSTOLIC BLOOD PRESSURE: 130 MMHG | TEMPERATURE: 98.3 F

## 2021-07-26 DIAGNOSIS — L91.8 SKIN TAG: Primary | ICD-10-CM

## 2021-07-26 PROCEDURE — 11200 RMVL SKIN TAGS UP TO&INC 15: CPT | Performed by: FAMILY MEDICINE

## 2021-07-26 PROCEDURE — 11201 RMVL SKIN TAGS EA ADDL 10: CPT | Performed by: FAMILY MEDICINE

## 2021-07-26 ASSESSMENT — PAIN SCALES - GENERAL: PAINLEVEL: NO PAIN (0)

## 2021-07-26 ASSESSMENT — MIFFLIN-ST. JEOR: SCORE: 1969.66

## 2021-07-26 NOTE — NURSING NOTE
"Chief Complaint   Patient presents with     Skin Tags       Initial /78   Pulse 65   Temp 98.3  F (36.8  C)   Ht 1.74 m (5' 8.5\")   Wt 128.8 kg (284 lb)   LMP 08/18/2013   SpO2 97%   BMI 42.55 kg/m   Estimated body mass index is 42.55 kg/m  as calculated from the following:    Height as of this encounter: 1.74 m (5' 8.5\").    Weight as of this encounter: 128.8 kg (284 lb).  Medication Reconciliation: complete  Wilber Resendez LPN  "

## 2021-08-02 NOTE — PROGRESS NOTES
Subjective Finding:    Chief compalint: Patient presents with:  Back Pain  Neck Pain  , Pain Scale: 5/10, Intensity: sharp, Duration: 1 week, Radiating: no.    Date of injury:     Activities that the pain restricts:   Home/household/hobbies/social activities: no.  Work duties: no.  Sleep: yes.  Makes symptoms better: rest.  Makes symptoms worse: activity and lumbar extension.  Have you seen anyone else for the symptoms? No.  Work related: no.  Automobile related injury: no.    Objective and Assessment:    Posture Analysis:   High shoulder: right.  Head tilt: right.  High iliac crest: right.  Head carriage: neutral.  Thoracic Kyphosis: neutral.  Lumbar Lordosis: forward.    Lumbar Range of Motion: flexion decreased and extension decreased.  Cervical Range of Motion: extension decreased.  Thoracic Range of Motion: .  Extremity Range of Motion: .    Palpation:   CT jxn     Segmental dysfunction pre-treatment and treatment area: C56  T4.  L45    Assessment post-treatment:  Cervical: ROM increased.  Thoracic: ROM increased.  Lumbar: ROM increased and pain and tenderness decreased.    Comments: .      Complicating Factors: .    Plan / Procedure:    Treatment plan: PRN.  Instructed patient: stretch as instructed at visit and walk 10 minutes.  Short term goals: reduce pain.  Long term goals: restore normal function.  Prognosis: excellent.

## 2021-09-05 DIAGNOSIS — I10 ESSENTIAL HYPERTENSION WITH GOAL BLOOD PRESSURE LESS THAN 140/90: ICD-10-CM

## 2021-09-07 RX ORDER — VALSARTAN AND HYDROCHLOROTHIAZIDE 80; 12.5 MG/1; MG/1
TABLET, FILM COATED ORAL
Qty: 90 TABLET | Refills: 3 | Status: SHIPPED | OUTPATIENT
Start: 2021-09-07 | End: 2022-09-19

## 2021-09-07 NOTE — TELEPHONE ENCOUNTER
eugenio      Last Written Prescription Date:  8/26/20  Last Fill Quantity: 90,   # refills: 3  Last Office Visit: 7/26/21  Future Office visit:

## 2021-09-28 ENCOUNTER — ANCILLARY PROCEDURE (OUTPATIENT)
Dept: MAMMOGRAPHY | Facility: OTHER | Age: 51
End: 2021-09-28
Attending: FAMILY MEDICINE
Payer: COMMERCIAL

## 2021-09-28 DIAGNOSIS — Z12.31 VISIT FOR SCREENING MAMMOGRAM: ICD-10-CM

## 2021-09-28 PROCEDURE — 77063 BREAST TOMOSYNTHESIS BI: CPT | Mod: TC | Performed by: RADIOLOGY

## 2021-09-28 PROCEDURE — 77067 SCR MAMMO BI INCL CAD: CPT | Mod: TC | Performed by: RADIOLOGY

## 2021-10-03 ENCOUNTER — HEALTH MAINTENANCE LETTER (OUTPATIENT)
Age: 51
End: 2021-10-03

## 2022-02-01 DIAGNOSIS — G47.33 OBSTRUCTIVE SLEEP APNEA (ADULT) (PEDIATRIC): Primary | ICD-10-CM

## 2022-02-28 ENCOUNTER — E-VISIT (OUTPATIENT)
Dept: URGENT CARE | Facility: CLINIC | Age: 52
End: 2022-02-28

## 2022-02-28 ENCOUNTER — TELEPHONE (OUTPATIENT)
Dept: FAMILY MEDICINE | Facility: OTHER | Age: 52
End: 2022-02-28
Payer: COMMERCIAL

## 2022-02-28 DIAGNOSIS — R21 RASH AND NONSPECIFIC SKIN ERUPTION: Primary | ICD-10-CM

## 2022-02-28 PROCEDURE — 99207 PR NON-BILLABLE SERV PER CHARTING: CPT | Performed by: FAMILY MEDICINE

## 2022-02-28 NOTE — PATIENT INSTRUCTIONS
Dear Paulette Flores,    We are sorry you are not feeling well. Based on the responses you provided, it is recommended that you be seen in-person in urgent care so we can better evaluate your symptoms. Please click here to find the nearest urgent care location to you.   You will not be charged for this Visit. Thank you for trusting us with your care.    Laly Pool MD

## 2022-02-28 NOTE — TELEPHONE ENCOUNTER
11:41 AM    Reason for Call: OVERBOOK    Patient is having the following symptoms: pt would like apt this week for red itchy bumps on legs    The patient is requesting an appointment for this week with Dr Villareal.    Was an appointment offered for this call? No  If yes : Appointment type              Date    Preferred method for responding to this message: Telephone Call  What is your phone number ?    If we cannot reach you directly, may we leave a detailed response at the number you provided? No    Can this message wait until your PCP/provider returns, if unavailable today? Not applicable,     Maria Esther Hernandez

## 2022-03-01 ENCOUNTER — OFFICE VISIT (OUTPATIENT)
Dept: FAMILY MEDICINE | Facility: OTHER | Age: 52
End: 2022-03-01
Attending: FAMILY MEDICINE
Payer: COMMERCIAL

## 2022-03-01 VITALS
HEART RATE: 90 BPM | OXYGEN SATURATION: 96 % | TEMPERATURE: 98.1 F | DIASTOLIC BLOOD PRESSURE: 79 MMHG | WEIGHT: 281 LBS | BODY MASS INDEX: 42.1 KG/M2 | SYSTOLIC BLOOD PRESSURE: 110 MMHG | RESPIRATION RATE: 18 BRPM

## 2022-03-01 DIAGNOSIS — B65.3 SWIMMER'S ITCH: Primary | ICD-10-CM

## 2022-03-01 PROCEDURE — 99213 OFFICE O/P EST LOW 20 MIN: CPT | Performed by: FAMILY MEDICINE

## 2022-03-01 NOTE — PROGRESS NOTES
"  Assessment & Plan     Swimmer's itch  Discussed you can get this from ocean.  I feel that is what is going on.  Doubt scabbies or other parasite at this time .  Will watch. Symptomatic treatment was discussed along when patient should call and/or come back into the clinic or go to ER/Urgent care. All questions answered.   So far no one else got it.                BMI:   Estimated body mass index is 42.1 kg/m  as calculated from the following:    Height as of 7/26/21: 1.74 m (5' 8.5\").    Weight as of this encounter: 127.5 kg (281 lb).           No follow-ups on file.    Manohar Villareal MD  [facility] - JAGDEEP Caldwell is a 51 year old who presents for the following health issues     HPI     Rash  Onset/Duration: 4 days   Description  Location: scattered throughout body  Character: round, raised, red, blanches to palpitation  Itching: severe  Intensity:  severe  Progression of Symptoms:  worsening  Accompanying signs and symptoms:   Fever: no  Body aches or joint pain: no  Sore throat symptoms: no  Recent cold symptoms: no  History:           Previous episodes of similar rash: possibly during an allergic reaction to meds in the past  New exposures:  clothes detergant while on vacation, TIDE  Recent travel: YES- [place]  Exposure to similar rash: no  Precipitating or alleviating factors: unknown  Therapies tried and outcome: hydrocortisone cream -  usually effective, Benadryl/diphenhydramine -  usually effective and salt bath  Itchy rash   Was swimming in ocean   Legs and arms         Review of Systems   CONSTITUTIONAL: NEGATIVE for fever, chills, change in weight  RESP: NEGATIVE for significant cough or SOB  CV: NEGATIVE for chest pain, palpitations or peripheral edema      Objective    /79 (BP Location: Left arm, Patient Position: Sitting, Cuff Size: Adult Regular)   Pulse 90   Temp 98.1  F (36.7  C) (Tympanic)   Resp 18   Wt 127.5 kg (281 lb)   LMP 08/18/2013   SpO2 96%   " BMI 42.10 kg/m    Body mass index is 42.1 kg/m .  Physical Exam   GENERAL: healthy, alert and no distress  SKIN: small papules /individuals maily and rare groups.  Some clear fluid in one of them - hands/arms/legs/ lateral left buttocks.  Mostly single lesions

## 2022-03-01 NOTE — PATIENT INSTRUCTIONS
Patient Education     Understanding Cercarial Dermatitis     A cool, moist compress can help reduce itching.   Cercarial dermatitis is a type of skin rash. The rash is also known as swimmer s itch. It s caused by an allergic reaction to tiny parasites. The parasites are sometimes found in water such as lakes, rivers, and the ocean. It can appear about a day after swimming in water with the parasites. It usually happens during the summer months. Cercarial dermatitis is not spread from person to person.    How to say it  Sir-KAYR-ee-roseline aap-dma-PN-tis  What causes cercarial dermatitis?  The condition is caused by tiny parasites that attach and go into the skin, and cause an allergic reaction. A cercaria is a larval stage of a parasite that can swim. The parasites are shed into water from the feces of animals such as ducks, seagulls, geese, and snails. The parasites can live in both fresh water and salt water. They can't live or reproduce in your skin. But they can cause an uncomfortable allergic reaction.   Swimmer s itch is not the same as seabather s eruption, which is caused by tiny jellyfish larvae. Seabather s eruption occurs on skin covered by swim clothing that traps jellyfish larvae. Cercarial dermatitis occurs on bare skin not covered by clothing.   Because the cercarial dermatitis rash is an allergic reaction, your body may react more strongly each time you are exposed to the parasites. You may have symptoms quicker, and they may be worse.   Symptoms of cercarial dermatitis  The skin symptoms include:    Burning    Itching    Red bumps that may look like pimples, hives, or blisters  Treatment for cercarial dermatitis  The rash will go away in a few weeks to a month. Make sure to prevent infection by not scratching it. You can treat itching with any of these:     Cool, moist compress. Put a clean damp cloth on the area for 20 to 30 minutes, 5 to 6 times a day as needed.    Corticosteroid cream or ointment.  You can apply this medicine several times a day on clean skin.    Antihistamine. This medicine can help ease itching. You can put it on your skin as a cream, or take it by mouth as a pill.    Colloidal oatmeal bath. Soaking in water with colloidal oatmeal can help soothe itchy skin.    Baking soda paste. This can help relieve itching. Mix baking soda with water into a paste. Put it on your rash.    Other anti-itch lotion or cream. Ask your healthcare provider about other anti-itch lotion or cream that can help relieve itching. He or she may prescribe a stronger medicine if drugstore medicine isn t helping you.  The best way to avoid getting the rash is to stay out of lakes or bodies of water known to be infested.   Possible complications of cercarial dermatitis  Scratching can cause a bacterial skin infection. An infection may need to be treated with antibiotic medicine. This can be put on the skin as a cream or ointment, or taken by mouth as a pill.   When to call your healthcare provider  Call your healthcare provider right away if you have any of these:    Fever of 100.4 F (38 C) or higher, or as directed by your healthcare provider    Symptoms that don t get better, or get worse    New symptoms  [employer] last reviewed this educational content on 6/1/2019 2000-2021 [employer]. All rights reserved. This information is not intended as a substitute for professional medical care. Always follow your healthcare professional's instructions.

## 2022-03-01 NOTE — NURSING NOTE
"Chief Complaint   Patient presents with     Derm Problem       Initial /79 (BP Location: Left arm, Patient Position: Sitting, Cuff Size: Adult Regular)   Pulse 90   Temp 98.1  F (36.7  C) (Tympanic)   Resp 18   Wt 127.5 kg (281 lb)   LMP 08/18/2013   SpO2 96%   BMI 42.10 kg/m   Estimated body mass index is 42.1 kg/m  as calculated from the following:    Height as of 7/26/21: 1.74 m (5' 8.5\").    Weight as of this encounter: 127.5 kg (281 lb).  Medication Reconciliation: complete  Keren Osullivan LPN  "

## 2022-04-21 ENCOUNTER — NURSE TRIAGE (OUTPATIENT)
Dept: FAMILY MEDICINE | Facility: OTHER | Age: 52
End: 2022-04-21
Payer: COMMERCIAL

## 2022-04-21 ENCOUNTER — ALLIED HEALTH/NURSE VISIT (OUTPATIENT)
Dept: FAMILY MEDICINE | Facility: OTHER | Age: 52
End: 2022-04-21
Attending: FAMILY MEDICINE
Payer: COMMERCIAL

## 2022-04-21 VITALS
RESPIRATION RATE: 18 BRPM | OXYGEN SATURATION: 96 % | DIASTOLIC BLOOD PRESSURE: 96 MMHG | SYSTOLIC BLOOD PRESSURE: 147 MMHG | HEART RATE: 64 BPM | TEMPERATURE: 97.8 F

## 2022-04-21 DIAGNOSIS — I10 ESSENTIAL HYPERTENSION WITH GOAL BLOOD PRESSURE LESS THAN 140/90: Primary | ICD-10-CM

## 2022-04-21 PROCEDURE — 99207 PR NO CHARGE NURSE ONLY: CPT

## 2022-04-21 NOTE — TELEPHONE ENCOUNTER
Call from patient requesting appt with Dr. Villareal for BP check.     /100 using at home BP monitor. Right shoulder, collar bone and neck pain. Pain is 8/10. Patient reports taking a couple ASA. Pain increases with raising arm. Pain starting on 4/20/22.    No recent injury or strenuous activity.     Spoke with kenan Ramírez schedule appointment for 4/22/22. Patient may take 800 mg ibuprofen for pain per Dr. Villareal. Patient to go to  for any worsening symptoms per Dr. Villareal     Patient notified and verbalized understanding. Appointment scheduled.     Next 5 appointments (look out 90 days)    Apr 22, 2022  1:45 PM  (Arrive by 1:30 PM)  SHORT with Manohar Villareal MD  [facility] ([facility] ) [address] [place] [address]  [phone]   Apr 28, 2022 10:30 AM  (Arrive by 10:15 AM)  PHYSICAL with Kathryn Mckeon NP  [facility] ([facility] ) [address] [place] [address]  [phone]              Reason for Disposition    Patient wants to be seen    Additional Information    Negative: Sounds like a life-threatening emergency to the triager    Negative: Pregnant > 20 weeks or postpartum (< 6 weeks after delivery) and new hand or face swelling    Negative: Pregnant > 20 weeks and BP > 140/90    Negative: Systolic BP >= 160 OR Diastolic >= 100, and any cardiac or neurologic symptoms (e.g., chest pain, difficulty breathing, unsteady gait, blurred vision)    Negative: Patient sounds very sick or weak to the triager    Negative: BP Systolic BP >= 140 OR Diastolic >= 90 and postpartum (from 0 to 6 weeks after delivery)    Negative: Systolic BP >= 180 OR Diastolic >= 110, and missed most recent dose of blood pressure medication    Negative: Systolic BP >= 180 OR Diastolic >= 110    Negative: Ran out of BP medications    Negative: Taking BP medications and feels is having side effects (e.g., impotence, cough,  "dizziness)    Answer Assessment - Initial Assessment Questions  1. BLOOD PRESSURE: \"What is the blood pressure?\" \"Did you take at least two measurements 5 minutes apart?\"      150/100 630 am       152/100 700 am     2. ONSET: \"When did you take your blood pressure?\"      630 am and 700 am     3. HOW: \"How did you obtain the blood pressure?\" (e.g., visiting nurse, automatic home BP monitor)      Home BP monitor     4. HISTORY: \"Do you have a history of high blood pressure?\"      History HTN on BP medications     5. MEDICATIONS: \"Are you taking any medications for blood pressure?\" \"Have you missed any doses recently?\"      Yes on BP medications, patient has not missed any doses of BP medication     6. OTHER SYMPTOMS: \"Do you have any symptoms?\" (e.g., headache, chest pain, blurred vision, difficulty breathing, weakness)      No     7. PREGNANCY: \"Is there any chance you are pregnant?\" \"When was your last menstrual period?\"      No    Protocols used: HIGH BLOOD PRESSURE-A-OH      "

## 2022-04-21 NOTE — NURSING NOTE
"Chief Complaint   Patient presents with     Blood Pressure Check       Initial BP (!) 154/86 (BP Location: Right arm, Patient Position: Sitting, Cuff Size: Adult Large)   Pulse 64   Temp 97.8  F (36.6  C) (Tympanic)   Resp 18   LMP 08/18/2013   SpO2 96%  Estimated body mass index is 42.1 kg/m  as calculated from the following:    Height as of 7/26/21: 1.74 m (5' 8.5\").    Weight as of 3/1/22: 127.5 kg (281 lb).  Medication Reconciliation: complete  Keren Osullivan LPN  "

## 2022-04-21 NOTE — PROGRESS NOTES
Patient here for BP check:    BP at 10:28 = 154/86    BP at 10:34 with Cardiology BP machine: 147/96

## 2022-04-21 NOTE — NURSING NOTE
"Chief Complaint   Patient presents with     Blood Pressure Check       Initial BP (!) 147/96 (BP Location: Right arm, Patient Position: Sitting, Cuff Size: Adult Large)   Pulse 64   Temp 97.8  F (36.6  C) (Tympanic)   Resp 18   LMP 08/18/2013   SpO2 96%  Estimated body mass index is 42.1 kg/m  as calculated from the following:    Height as of 7/26/21: 1.74 m (5' 8.5\").    Weight as of 3/1/22: 127.5 kg (281 lb).  Medication Reconciliation: complete  Keren Osullivan LPN  "

## 2022-04-22 ENCOUNTER — OFFICE VISIT (OUTPATIENT)
Dept: FAMILY MEDICINE | Facility: OTHER | Age: 52
End: 2022-04-22
Attending: NURSE PRACTITIONER
Payer: COMMERCIAL

## 2022-04-22 VITALS
TEMPERATURE: 97.8 F | HEART RATE: 60 BPM | WEIGHT: 288 LBS | HEIGHT: 69 IN | OXYGEN SATURATION: 98 % | SYSTOLIC BLOOD PRESSURE: 134 MMHG | DIASTOLIC BLOOD PRESSURE: 86 MMHG | BODY MASS INDEX: 42.65 KG/M2 | RESPIRATION RATE: 20 BRPM

## 2022-04-22 DIAGNOSIS — I10 ESSENTIAL HYPERTENSION WITH GOAL BLOOD PRESSURE LESS THAN 140/90: ICD-10-CM

## 2022-04-22 DIAGNOSIS — E78.2 MIXED HYPERLIPIDEMIA: ICD-10-CM

## 2022-04-22 DIAGNOSIS — S46.911A RIGHT SHOULDER STRAIN, INITIAL ENCOUNTER: Primary | ICD-10-CM

## 2022-04-22 PROCEDURE — 93000 ELECTROCARDIOGRAM COMPLETE: CPT | Mod: 77 | Performed by: INTERNAL MEDICINE

## 2022-04-22 PROCEDURE — 99214 OFFICE O/P EST MOD 30 MIN: CPT | Mod: 25 | Performed by: NURSE PRACTITIONER

## 2022-04-22 RX ORDER — CYCLOBENZAPRINE HCL 5 MG
5 TABLET ORAL 3 TIMES DAILY PRN
Qty: 30 TABLET | Refills: 0 | Status: SHIPPED | OUTPATIENT
Start: 2022-04-22 | End: 2022-06-15

## 2022-04-22 RX ORDER — IBUPROFEN 800 MG/1
800 TABLET, FILM COATED ORAL EVERY 8 HOURS PRN
Qty: 60 TABLET | Refills: 0 | Status: SHIPPED | OUTPATIENT
Start: 2022-04-22

## 2022-04-22 ASSESSMENT — PAIN SCALES - GENERAL: PAINLEVEL: NO PAIN (0)

## 2022-04-22 NOTE — NURSING NOTE
"Chief Complaint   Patient presents with     Hypertension       Initial BP (!) 148/86 (BP Location: Left arm)   Pulse 60   Temp 97.8  F (36.6  C) (Tympanic)   Resp 20   Ht 1.74 m (5' 8.5\")   Wt 130.6 kg (288 lb)   LMP 08/18/2013   SpO2 98%   BMI 43.15 kg/m   Estimated body mass index is 43.15 kg/m  as calculated from the following:    Height as of this encounter: 1.74 m (5' 8.5\").    Weight as of this encounter: 130.6 kg (288 lb).  Medication Reconciliation: complete  Meghna Latham LPN    "

## 2022-04-22 NOTE — PROGRESS NOTES
"  Assessment & Plan   1. Right shoulder strain, initial encounter  Return if not improving in next 2-4 weeks or certainly if symptoms worsen. RICE reviewed.   - ibuprofen (ADVIL/MOTRIN) 800 MG tablet; Take 1 tablet (800 mg) by mouth every 8 hours as needed for moderate pain  Dispense: 60 tablet; Refill: 0  - cyclobenzaprine (FLEXERIL) 5 MG tablet; Take 1 tablet (5 mg) by mouth 3 times daily as needed for muscle spasms  Dispense: 30 tablet; Refill: 0    2. Essential hypertension with goal blood pressure less than 140/90  BP is at goal today. Recommend follow up for chronic disease management with primary provider in next week or two. Educated on s/s of MI. Currently she is not having alarm features. She will go into the ER if she notices any.   - EKG 12-lead complete w/read - (Clinic Performed)  - Comprehensive metabolic panel (BMP + Alb, Alk Phos, ALT, AST, Total. Bili, TP); Future  - TSH with free T4 reflex; Future  - TSH with free T4 reflex  - Comprehensive metabolic panel (BMP + Alb, Alk Phos, ALT, AST, Total. Bili, TP)    3. Mixed hyperlipidemia  - Lipid Profile (Chol, Trig, HDL, LDL calc); Future      Ordering of each unique test  Prescription drug management     BMI:   Estimated body mass index is 43.15 kg/m  as calculated from the following:    Height as of this encounter: 1.74 m (5' 8.5\").    Weight as of this encounter: 130.6 kg (288 lb).       No follow-ups on file.    Myranda Park, CNP  [facility] - [place]    Subjective    HPI  Paulette is a 51 year old who presents for elevated blood pressures with concurrent treatment of hypertension.      Per nurse triage note on 4/21/22:  Call from patient requesting appt with Dr. Villareal for BP check.    /100 using at home BP monitor. Right shoulder, collar bone and neck pain. Pain is 8/10. Patient reports taking a couple ASA. Pain increases with raising arm. Pain starting on 4/20/22.   No recent injury or strenuous activity.    Spoke with Dr." Mono, may schedule appointment for 4/22/22. Patient may take 800 mg ibuprofen for pain per Dr. Villareal. Patient to go to  for any worsening symptoms per Dr. Villareal    Patient notified and verbalized understanding. Appointment scheduled.     Visit was rescheduled with me today as PCP is out of office today.   Last EKG was 11/14/17 and NSRAnnetta Caldwell reports that two days ago she was shoveling a lot. She felt fine while doing this. No SOB, chest pain, pressure, fatigue. Yesterday, she started developing pain in her right shoulder. She reports that it hurt when moving her right arm up and down and was painful to the touch just below the clavicle (midline). She has not had any SOB, or activity intolerance. Yesterday, she did have indigestion and some diarrhea and stomach pain. Did have a normal BP and resolution of stomach pain last night. She did not go to work today due to being the appointment, being tired, and the right shoulder pain. She is having difficulty laying down due to pain with the shoulder.     Denies     Hypertension Follow-up  Currently taking valsartan-hydrochlorothiazide. She is due for follow up with PCP. She requests labs be ordered for review with Dr. Villareal.     Do you check your blood pressure regularly outside of the clinic? Yes     Are you following a low salt diet? Yes    Are your blood pressures ever more than 140 on the top number (systolic) OR more   than 90 on the bottom number (diastolic), for example 140/90? Yes      How many servings of fruits and vegetables do you eat daily?  0-1    On average, how many sweetened beverages do you drink each day (Examples: soda, juice, sweet tea, etc.  Do NOT count diet or artificially sweetened beverages)?   1    How many days per week do you exercise enough to make your heart beat faster? 3 or less    How many minutes a day do you exercise enough to make your heart beat faster? 9 or less    How many days per week do you miss taking your  medication? 0          Review of Systems   Constitutional, HEENT, cardiovascular, pulmonary, gi and gu systems are negative, except as otherwise noted.      Objective    LMP 08/18/2013   There is no height or weight on file to calculate BMI.     Physical Exam   GENERAL: healthy, alert and no distress  EYES: Eyes grossly normal to inspection, PERRL and conjunctivae and sclerae normal  NECK: no adenopathy, no asymmetry, masses, or scars, thyroid normal to palpation and no carotid bruits  RESP: lungs clear to auscultation - no rales, rhonchi or wheezes  CV: regular rate and rhythm, normal S1 S2, no S3 or S4, no murmur, click or rub, no peripheral edema and peripheral pulses strong  ABDOMEN: soft, nontender, no hepatosplenomegaly, no masses and bowel sounds normal  MS: Reproducible tenderness midline just below clavicle as well tenderness to shoulder anteriorly. No bursa tenderness. No posterior tenderness. No color changes, edema, or effusion. Pt reports pain with internal rotation with full ROM remains preserved.   SKIN: no suspicious lesions or rashes      No results found for any visits on 04/22/22.    EKG - Reviewed and interpreted by me appears normal, NSR, normal axis, normal intervals, no acute ST/T changes c/w ischemia, no LVH by voltage criteria, unchanged from previous tracings

## 2022-04-27 ENCOUNTER — LAB (OUTPATIENT)
Dept: LAB | Facility: OTHER | Age: 52
End: 2022-04-27
Attending: FAMILY MEDICINE
Payer: COMMERCIAL

## 2022-04-27 ENCOUNTER — OFFICE VISIT (OUTPATIENT)
Dept: FAMILY MEDICINE | Facility: OTHER | Age: 52
End: 2022-04-27
Attending: FAMILY MEDICINE
Payer: COMMERCIAL

## 2022-04-27 VITALS
HEART RATE: 71 BPM | TEMPERATURE: 97.2 F | DIASTOLIC BLOOD PRESSURE: 82 MMHG | SYSTOLIC BLOOD PRESSURE: 132 MMHG | RESPIRATION RATE: 18 BRPM | WEIGHT: 288.8 LBS | OXYGEN SATURATION: 97 % | HEIGHT: 69 IN | BODY MASS INDEX: 42.78 KG/M2

## 2022-04-27 DIAGNOSIS — E78.2 MIXED HYPERLIPIDEMIA: ICD-10-CM

## 2022-04-27 DIAGNOSIS — Z12.11 SPECIAL SCREENING FOR MALIGNANT NEOPLASMS, COLON: ICD-10-CM

## 2022-04-27 DIAGNOSIS — R73.9 ELEVATED BLOOD SUGAR: Primary | ICD-10-CM

## 2022-04-27 DIAGNOSIS — E11.9 TYPE 2 DIABETES MELLITUS WITHOUT COMPLICATION, WITHOUT LONG-TERM CURRENT USE OF INSULIN (H): ICD-10-CM

## 2022-04-27 DIAGNOSIS — E66.01 MORBID OBESITY (H): ICD-10-CM

## 2022-04-27 DIAGNOSIS — I10 ESSENTIAL HYPERTENSION WITH GOAL BLOOD PRESSURE LESS THAN 140/90: ICD-10-CM

## 2022-04-27 LAB
ALBUMIN SERPL-MCNC: 3.9 G/DL (ref 3.4–5)
ALP SERPL-CCNC: 56 U/L (ref 40–150)
ALT SERPL W P-5'-P-CCNC: 93 U/L (ref 0–50)
ANION GAP SERPL CALCULATED.3IONS-SCNC: 7 MMOL/L (ref 3–14)
AST SERPL W P-5'-P-CCNC: 44 U/L (ref 0–45)
BASOPHILS # BLD AUTO: 0 10E3/UL (ref 0–0.2)
BASOPHILS NFR BLD AUTO: 1 %
BILIRUB SERPL-MCNC: 0.4 MG/DL (ref 0.2–1.3)
BUN SERPL-MCNC: 12 MG/DL (ref 7–30)
CALCIUM SERPL-MCNC: 8.8 MG/DL (ref 8.5–10.1)
CHLORIDE BLD-SCNC: 107 MMOL/L (ref 94–109)
CHOLEST SERPL-MCNC: 150 MG/DL
CO2 SERPL-SCNC: 24 MMOL/L (ref 20–32)
CREAT SERPL-MCNC: 0.7 MG/DL (ref 0.52–1.04)
EOSINOPHIL # BLD AUTO: 0.3 10E3/UL (ref 0–0.7)
EOSINOPHIL NFR BLD AUTO: 5 %
ERYTHROCYTE [DISTWIDTH] IN BLOOD BY AUTOMATED COUNT: 13.7 % (ref 10–15)
EST. AVERAGE GLUCOSE BLD GHB EST-MCNC: 143 MG/DL
FASTING STATUS PATIENT QL REPORTED: YES
GFR SERPL CREATININE-BSD FRML MDRD: >90 ML/MIN/1.73M2
GLUCOSE BLD-MCNC: 125 MG/DL (ref 70–99)
HBA1C MFR BLD: 6.6 % (ref 0–5.6)
HCT VFR BLD AUTO: 43 % (ref 35–47)
HDLC SERPL-MCNC: 38 MG/DL
HGB BLD-MCNC: 14.2 G/DL (ref 11.7–15.7)
HOLD SPECIMEN: NORMAL
IMM GRANULOCYTES # BLD: 0 10E3/UL
IMM GRANULOCYTES NFR BLD: 1 %
LDLC SERPL CALC-MCNC: 84 MG/DL
LYMPHOCYTES # BLD AUTO: 1.4 10E3/UL (ref 0.8–5.3)
LYMPHOCYTES NFR BLD AUTO: 24 %
MCH RBC QN AUTO: 29.5 PG (ref 26.5–33)
MCHC RBC AUTO-ENTMCNC: 33 G/DL (ref 31.5–36.5)
MCV RBC AUTO: 89 FL (ref 78–100)
MONOCYTES # BLD AUTO: 0.6 10E3/UL (ref 0–1.3)
MONOCYTES NFR BLD AUTO: 10 %
NEUTROPHILS # BLD AUTO: 3.5 10E3/UL (ref 1.6–8.3)
NEUTROPHILS NFR BLD AUTO: 59 %
NONHDLC SERPL-MCNC: 112 MG/DL
NRBC # BLD AUTO: 0 10E3/UL
NRBC BLD AUTO-RTO: 0 /100
PLATELET # BLD AUTO: 219 10E3/UL (ref 150–450)
POTASSIUM BLD-SCNC: 3.9 MMOL/L (ref 3.4–5.3)
PROT SERPL-MCNC: 7.5 G/DL (ref 6.8–8.8)
RBC # BLD AUTO: 4.81 10E6/UL (ref 3.8–5.2)
SODIUM SERPL-SCNC: 138 MMOL/L (ref 133–144)
TRIGL SERPL-MCNC: 138 MG/DL
TSH SERPL DL<=0.005 MIU/L-ACNC: 2.9 MU/L (ref 0.4–4)
WBC # BLD AUTO: 5.9 10E3/UL (ref 4–11)

## 2022-04-27 PROCEDURE — 90471 IMMUNIZATION ADMIN: CPT | Performed by: FAMILY MEDICINE

## 2022-04-27 PROCEDURE — 80050 GENERAL HEALTH PANEL: CPT | Performed by: NURSE PRACTITIONER

## 2022-04-27 PROCEDURE — 99214 OFFICE O/P EST MOD 30 MIN: CPT | Mod: 25 | Performed by: FAMILY MEDICINE

## 2022-04-27 PROCEDURE — 83036 HEMOGLOBIN GLYCOSYLATED A1C: CPT | Performed by: FAMILY MEDICINE

## 2022-04-27 PROCEDURE — 90750 HZV VACC RECOMBINANT IM: CPT | Performed by: FAMILY MEDICINE

## 2022-04-27 PROCEDURE — 80061 LIPID PANEL: CPT

## 2022-04-27 PROCEDURE — 36415 COLL VENOUS BLD VENIPUNCTURE: CPT | Performed by: NURSE PRACTITIONER

## 2022-04-27 ASSESSMENT — PAIN SCALES - GENERAL: PAINLEVEL: MODERATE PAIN (5)

## 2022-04-27 NOTE — PROGRESS NOTES
"  Assessment & Plan     Elevated blood sugar  See below.   - Hemoglobin A1c  - CBC with Platelets & Differential    Essential hypertension with goal blood pressure less than 140/90  Overall not too concerned.  Check our cuff with our cuff. Continue current medications and behavioral changes.   - CBC with Platelets & Differential    Mixed hyperlipidemia  Stable on statin   - CBC with Platelets & Differential    Special screening for malignant neoplasms, colon  Due or overdue. Will refer.   - Adult General Surg Referral  - CBC with Platelets & Differential    Type 2 diabetes mellitus without complication, without long-term current use of insulin (H)  NEW dx-- discussed.  Will get into see DRC. Will hold on meds for now and have work on diet and exercise along with wt loss.  Follow-up in 3 months with me.     Morbid Obesity  -- as above. Work with DRC.  Highly consider [place] or one of those for start to help with this and the above.             BMI:   Estimated body mass index is 43.27 kg/m  as calculated from the following:    Height as of this encounter: 1.74 m (5' 8.5\").    Weight as of this encounter: 131 kg (288 lb 12.8 oz).   Weight management plan: Discussed healthy diet and exercise guidelines        No follow-ups on file.    Manohar Villareal MD  [facility] - JAGDEEP Caldwell is a 51 year old who presents for the following health issues     HPI     Hypertension Follow-up      Do you check your blood pressure regularly outside of the clinic? Yes     Are you following a low salt diet? Yes    Are your blood pressures ever more than 140 on the top number (systolic) OR more   than 90 on the bottom number (diastolic), for example 140/90? Yes    Review EKG results      How many servings of fruits and vegetables do you eat daily?  2-3    On average, how many sweetened beverages do you drink each day (Examples: soda, juice, sweet tea, etc.  Do NOT count diet or artificially sweetened " "beverages)?   1    How many days per week do you exercise enough to make your heart beat faster? 3 or less    How many minutes a day do you exercise enough to make your heart beat faster? 10 - 19    How many days per week do you miss taking your medication? 0    Pt is anxious - seen [facility] and had abnormal EKG  BP at home high but normal here  Has not checked her cuff with ours  Told needs labs - done today     Shoulder does seem more MSK in nature    Also overdue for colonoscopy     Review of Systems   Constitutional, HEENT, cardiovascular, pulmonary, gi and gu systems are negative, except as otherwise noted.      Objective    /82 (BP Location: Right arm, Patient Position: Sitting, Cuff Size: Adult Large)   Pulse 71   Temp 97.2  F (36.2  C) (Tympanic)   Resp 18   Ht 1.74 m (5' 8.5\")   Wt 131 kg (288 lb 12.8 oz)   LMP 08/18/2013   SpO2 97%   BMI 43.27 kg/m    Body mass index is 43.27 kg/m .  Physical Exam   GENERAL: healthy, alert and no distress  HENT: ear canals and TM's normal, nose and mouth without ulcers or lesions  NECK: no adenopathy, no asymmetry, masses, or scars and thyroid normal to palpation  RESP: lungs clear to auscultation - no rales, rhonchi or wheezes  CV: regular rate and rhythm, normal S1 S2, no S3 or S4, no murmur, click or rub, no peripheral edema and peripheral pulses strong  ABDOMEN: soft, nontender, no hepatosplenomegaly, no masses and bowel sounds normal    Results for orders placed or performed in visit on 04/27/22   Hemoglobin A1c     Status: Abnormal   Result Value Ref Range    Estimated Average Glucose 143 mg/dL    Hemoglobin A1C 6.6 (H) 0.0 - 5.6 %   CBC with platelets and differential     Status: None   Result Value Ref [place]    WBC Count 5.9 4.0 - 11.0 10e3/uL    RBC Count 4.81 3.80 - 5.20 10e6/uL    Hemoglobin 14.2 11.7 - 15.7 g/dL    Hematocrit 43.0 35.0 - 47.0 %    MCV 89 78 - 100 fL    MCH 29.5 26.5 - 33.0 pg    MCHC 33.0 31.5 - 36.5 g/dL    RDW 13.7 10.0 - 15.0 %    " Platelet Count 219 150 - 450 10e3/uL    % Neutrophils 59 %    % Lymphocytes 24 %    % Monocytes 10 %    % Eosinophils 5 %    % Basophils 1 %    % Immature Granulocytes 1 %    NRBCs per 100 WBC 0 <1 /100    Absolute Neutrophils 3.5 1.6 - 8.3 10e3/uL    Absolute Lymphocytes 1.4 0.8 - 5.3 10e3/uL    Absolute Monocytes 0.6 0.0 - 1.3 10e3/uL    Absolute Eosinophils 0.3 0.0 - 0.7 10e3/uL    Absolute Basophils 0.0 0.0 - 0.2 10e3/uL    Absolute Immature Granulocytes 0.0 <=0.4 10e3/uL    Absolute NRBCs 0.0 10e3/uL   CBC with Platelets & Differential     Status: None    Narrative    The following orders were created for panel order CBC with Platelets & Differential.  Procedure                               Abnormality         Status                     ---------                               -----------         ------                     CBC with platelets and d...[[id number]]                      Final result                 Please view results for these tests on the individual orders.   Results for orders placed or performed in visit on 04/27/22   Lipid Profile (Chol, Trig, HDL, LDL calc)     Status: Abnormal   Result Value Ref [place]    Cholesterol 150 <200 mg/dL    Triglycerides 138 <150 mg/dL    Direct Measure HDL 38 (L) >=50 mg/dL    LDL Cholesterol Calculated 84 <=100 mg/dL    Non HDL Cholesterol 112 <130 mg/dL    Patient Fasting > 8hrs? Yes     Narrative    Cholesterol  Desirable:  <200 mg/dL    Triglycerides  Normal:  Less than 150 mg/dL  Borderline High:  150-199 mg/dL  High:  200-499 mg/dL  Very High:  Greater than or equal to 500 mg/dL    Direct Measure HDL  Female:  Greater than or equal to 50 mg/dL   Male:  Greater than or equal to 40 mg/dL    LDL Cholesterol  Desirable:  <100mg/dL  Above Desirable:  100-129 mg/dL   Borderline High:  130-159 mg/dL   High:  160-189 mg/dL   Very High:  >= 190 mg/dL    Non HDL Cholesterol  Desirable:  130 mg/dL  Above Desirable:  130-159 mg/dL  Borderline High:  160-189 mg/dL  High:   190-219 mg/dL  Very High:  Greater than or equal to 220 mg/dL   Extra Tube     Status: None    Narrative    The following orders were created for panel order Extra Tube.  Procedure                               Abnormality         Status                     ---------                               -----------         ------                     Extra Purple Top Tube[[id number]]                            Final result                 Please view results for these tests on the individual orders.   Extra Purple Top Tube     Status: None   Result Value Ref [place]    Hold Specimen JIC

## 2022-04-27 NOTE — NURSING NOTE
"Chief Complaint   Patient presents with     Hypertension       Initial /82 (BP Location: Right arm, Patient Position: Sitting, Cuff Size: Adult Large)   Pulse 71   Temp 97.2  F (36.2  C) (Tympanic)   Resp 18   Ht 1.74 m (5' 8.5\")   Wt 131 kg (288 lb 12.8 oz)   LMP 08/18/2013   SpO2 97%   BMI 43.27 kg/m   Estimated body mass index is 43.27 kg/m  as calculated from the following:    Height as of this encounter: 1.74 m (5' 8.5\").    Weight as of this encounter: 131 kg (288 lb 12.8 oz).  Medication Reconciliation: complete  Pilar Woo MA  "

## 2022-05-16 ENCOUNTER — NURSE TRIAGE (OUTPATIENT)
Dept: FAMILY MEDICINE | Facility: OTHER | Age: 52
End: 2022-05-16
Payer: COMMERCIAL

## 2022-05-16 ENCOUNTER — OFFICE VISIT (OUTPATIENT)
Dept: FAMILY MEDICINE | Facility: OTHER | Age: 52
End: 2022-05-16
Attending: FAMILY MEDICINE
Payer: COMMERCIAL

## 2022-05-16 VITALS
TEMPERATURE: 97.4 F | RESPIRATION RATE: 18 BRPM | HEART RATE: 76 BPM | DIASTOLIC BLOOD PRESSURE: 86 MMHG | WEIGHT: 287 LBS | SYSTOLIC BLOOD PRESSURE: 136 MMHG | BODY MASS INDEX: 43 KG/M2 | OXYGEN SATURATION: 95 %

## 2022-05-16 DIAGNOSIS — J01.00 ACUTE NON-RECURRENT MAXILLARY SINUSITIS: Primary | ICD-10-CM

## 2022-05-16 PROCEDURE — 99213 OFFICE O/P EST LOW 20 MIN: CPT | Performed by: FAMILY MEDICINE

## 2022-05-16 RX ORDER — CEFPROZIL 500 MG/1
500 TABLET, FILM COATED ORAL 2 TIMES DAILY
Qty: 20 TABLET | Refills: 0 | Status: SHIPPED | OUTPATIENT
Start: 2022-05-16 | End: 2022-06-15

## 2022-05-16 ASSESSMENT — PAIN SCALES - GENERAL: PAINLEVEL: SEVERE PAIN (6)

## 2022-05-16 NOTE — PROGRESS NOTES
Assessment & Plan     Acute non-recurrent maxillary sinusitis  Will treat. Symptomatic treatment was discussed along what is available for OTC medications for symptomatic relief.   Symptomatic treatment was discussed along when patient should call and/or come back into the clinic or go to ER/Urgent care. All questions answered.   - cefPROZIL (CEFZIL) 500 MG tablet; Take 1 tablet (500 mg) by mouth 2 times daily                 No follow-ups on file.    Manohar Villareal MD  [facility] - JAGDEEP Caldwell is a 51 year old who presents for the following health issues     HPI     Acute Illness  Acute illness concerns: dizziness, congestion  Onset/Duration: 1 week  Symptoms:  Fever: no  Chills/Sweats: no  Headache (location?): YES  Sinus Pressure: YES  Conjunctivitis:  no  Ear Pain: no  Rhinorrhea: no  Congestion: YES  Sore Throat: YES  Cough: YES-non-productive  Wheeze: no  Decreased Appetite: no  Nausea: no  Vomiting: no  Diarrhea: no  Dysuria/Freq.: no  Dysuria or Hematuria: no  Fatigue/Achiness: YES  Sick/Strep Exposure: YES- last week daughter  Therapies tried and outcome: mucinex, alkaseltzer, neti pot, ibuprofen    Sinus pain and pressure  No seasonal allergies   Getting worse     covid test at home negative     Review of Systems   Constitutional, HEENT, cardiovascular, pulmonary, gi and gu systems are negative, except as otherwise noted.      Objective    /86 (BP Location: Right arm, Patient Position: Sitting, Cuff Size: Adult Large)   Pulse 76   Temp 97.4  F (36.3  C) (Tympanic)   Resp 18   Wt 130.2 kg (287 lb)   LMP 08/18/2013   SpO2 95%   BMI 43.00 kg/m    Body mass index is 43 kg/m .  Physical Exam   GENERAL: healthy, alert and no distress  EYES: Eyes grossly normal to inspection, PERRL and conjunctivae and sclerae normal  HENT: ear canals and TM's normal, nose and mouth without ulcers or lesions--  Maxillary sinus tenderness.   NECK: no adenopathy, no asymmetry, masses,  or scars and thyroid normal to palpation  RESP: lungs clear to auscultation - no rales, rhonchi or wheezes  CV: regular rate and rhythm, normal S1 S2, no S3 or S4, no murmur, click or rub, no peripheral edema and peripheral pulses strong

## 2022-05-16 NOTE — TELEPHONE ENCOUNTER
"    Additional Information    Negative: Sounds like a life-threatening emergency to the triager    Negative: Difficulty breathing, and not from stuffy nose (e.g., not relieved by cleaning out the nose)    Negative: SEVERE headache and has fever    Negative: Patient sounds very sick or weak to the triager    Negative: SEVERE sinus pain    Negative: Severe headache    Negative: Redness or swelling on the cheek, forehead, or around the eye    Negative: Fever > 103 F (39.4 C)    Negative: Fever > 101 F (38.3 C) and over 60 years of age    Negative: Fever > 100.0 F (37.8 C) and has diabetes mellitus or a weak immune system (e.g., HIV positive, cancer chemotherapy, organ transplant, splenectomy, chronic steroids)    Negative: Fever > 100.0 F (37.8 C) and bedridden (e.g., nursing home patient, stroke, chronic illness, recovering from surgery)    Negative: Fever present > 3 days (72 hours)    Negative: Fever returns after gone for over 24 hours and symptoms worse or not improved    Negative: Sinus pain (not just congestion) and fever    Earache    Answer Assessment - Initial Assessment Questions  1. LOCATION: \"Where does it hurt?\"       Sinus pain and pressure HA  2. ONSET: \"When did the sinus pain start?\"  (e.g., hours, days)       5/11/22  3. SEVERITY: \"How bad is the pain?\"   (Scale 1-10; mild, moderate or severe)    - MILD (1-3): doesn't interfere with normal activities     - MODERATE (4-7): interferes with normal activities (e.g., work or school) or awakens from sleep    - SEVERE (8-10): excruciating pain and patient unable to do any normal activities        Moderate to severe  4. RECURRENT SYMPTOM: \"Have you ever had sinus problems before?\" If so, ask: \"When was the last time?\" and \"What happened that time?\"       yes  5. NASAL CONGESTION: \"Is the nose blocked?\" If so, ask, \"Can you open it or must you breathe through the mouth?\"      yes  6. NASAL DISCHARGE: \"Do you have discharge from your nose?\" If so ask, \"What " "color?\"      Yellow bloody  7. FEVER: \"Do you have a fever?\" If so, ask: \"What is it, how was it measured, and when did it start?\"       no  8. OTHER SYMPTOMS: \"Do you have any other symptoms?\" (e.g., sore throat, cough, earache, difficulty breathing)      Sore throat cough earache   9. PREGNANCY: \"Is there any chance you are pregnant?\" \"When was your last menstrual period?\"      no    Protocols used: SINUS PAIN AND CONGESTION-A-[place]      "

## 2022-05-16 NOTE — NURSING NOTE
"Chief Complaint   Patient presents with     Dizziness     Nasal Congestion       Initial /86 (BP Location: Right arm, Patient Position: Sitting, Cuff Size: Adult Large)   Pulse 76   Temp 97.4  F (36.3  C) (Tympanic)   Resp 18   Wt 130.2 kg (287 lb)   LMP 08/18/2013   SpO2 95%   BMI 43.00 kg/m   Estimated body mass index is 43 kg/m  as calculated from the following:    Height as of 4/27/22: 1.74 m (5' 8.5\").    Weight as of this encounter: 130.2 kg (287 lb).  Medication Reconciliation: complete  Keren Osullivan LPN  "

## 2022-05-19 ENCOUNTER — HOSPITAL ENCOUNTER (OUTPATIENT)
Dept: EDUCATION SERVICES | Facility: HOSPITAL | Age: 52
Discharge: HOME | End: 2022-05-19
Attending: FAMILY MEDICINE | Admitting: FAMILY MEDICINE
Payer: COMMERCIAL

## 2022-05-19 VITALS
SYSTOLIC BLOOD PRESSURE: 129 MMHG | WEIGHT: 283.9 LBS | OXYGEN SATURATION: 97 % | HEIGHT: 68 IN | BODY MASS INDEX: 43.03 KG/M2 | RESPIRATION RATE: 16 BRPM | HEART RATE: 93 BPM | DIASTOLIC BLOOD PRESSURE: 85 MMHG

## 2022-05-19 DIAGNOSIS — E11.65 TYPE 2 DIABETES MELLITUS WITH HYPERGLYCEMIA, WITHOUT LONG-TERM CURRENT USE OF INSULIN (H): Primary | ICD-10-CM

## 2022-05-19 PROCEDURE — G0108 DIAB MANAGE TRN  PER INDIV: HCPCS

## 2022-05-19 RX ORDER — LANCETS
EACH MISCELLANEOUS
Qty: 100 EACH | Refills: 11 | Status: SHIPPED | OUTPATIENT
Start: 2022-05-19

## 2022-05-19 ASSESSMENT — ANXIETY QUESTIONNAIRES
2. NOT BEING ABLE TO STOP OR CONTROL WORRYING: NOT AT ALL
IF YOU CHECKED OFF ANY PROBLEMS ON THIS QUESTIONNAIRE, HOW DIFFICULT HAVE THESE PROBLEMS MADE IT FOR YOU TO DO YOUR WORK, TAKE CARE OF THINGS AT HOME, OR GET ALONG WITH OTHER PEOPLE: NOT DIFFICULT AT ALL
6. BECOMING EASILY ANNOYED OR IRRITABLE: NOT AT ALL
GAD7 TOTAL SCORE: 1
1. FEELING NERVOUS, ANXIOUS, OR ON EDGE: NOT AT ALL
4. TROUBLE RELAXING: NOT AT ALL
5. BEING SO RESTLESS THAT IT IS HARD TO SIT STILL: NOT AT ALL
3. WORRYING TOO MUCH ABOUT DIFFERENT THINGS: SEVERAL DAYS
7. FEELING AFRAID AS IF SOMETHING AWFUL MIGHT HAPPEN: NOT AT ALL
GAD7 TOTAL SCORE: 1

## 2022-05-19 ASSESSMENT — PAIN SCALES - GENERAL: PAINLEVEL: NO PAIN (0)

## 2022-05-19 ASSESSMENT — PATIENT HEALTH QUESTIONNAIRE - PHQ9: SUM OF ALL RESPONSES TO PHQ QUESTIONS 1-9: 2

## 2022-05-19 NOTE — PROGRESS NOTES
"Diabetes Self-Management Education & Support    Presents for: Initial Assessment for new diagnosis    SUBJECTIVE/OBJECTIVE:  Presents for: Initial Assessment for new diagnosis  Accompanied by: Self  Diabetes education in the past 24mo: No  Focus of Visit: Healthy Eating  Diabetes type: Type 2  Date of diagnosis: 4/27/22  Disease course: Stable  Diabetes management related comments/concerns: What to eat - wants to lose weight  Transportation concerns: No  Difficulty affording diabetes medication?: No  Difficulty affording diabetes testing supplies?: No  Other concerns:: None  Cultural Influences/Ethnic Background:  Choose not to answer    Diabetes Symptoms & Complications:  Fatigue: No  Neuropathy: No  Polydipsia: No  Polyphagia: No  Polyuria: No  Slow healing wounds: No  Symptom course: Stable  Weight trend: Increasing  Complications assessed today?: Yes  Autonomic neuropathy: No  CVA: No  Heart disease: No  Nephropathy: No  Peripheral neuropathy: No  Peripheral Vascular Disease: No  Retinopathy: No    Patient Problem List and Family Medical History reviewed for relevant medical history, current medical status, and diabetes risk factors.    Vitals:  /85   Pulse 93   Resp 16   Ht 1.734 m (5' 8.25\")   Wt 128.8 kg (283 lb 14.4 oz)   LMP 08/18/2013   SpO2 97%   BMI 42.85 kg/m    Estimated body mass index is 42.85 kg/m  as calculated from the following:    Height as of this encounter: 1.734 m (5' 8.25\").    Weight as of this encounter: 128.8 kg (283 lb 14.4 oz).   Last 3 BP:   BP Readings from Last 3 Encounters:   05/19/22 129/85   05/16/22 136/86   04/27/22 132/82       History   Smoking Status     Never Smoker   Smokeless Tobacco     Never Used       Labs:  Lab Results   Component Value Date    A1C 6.6 04/27/2022     Lab Results   Component Value Date     04/27/2022     08/26/2020     Lab Results   Component Value Date    LDL 84 04/27/2022    LDL 83 08/26/2020     HDL Cholesterol   Date Value " Ref [place] Status   08/26/2020 40 (L) >49 mg/dL Final     Direct Measure HDL   Date Value Ref [place] Status   04/27/2022 38 (L) >=50 mg/dL Final   ]  GFR Estimate   Date Value Ref [place] Status   04/27/2022 >90 >60 mL/min/1.73m2 Final     Comment:     Effective December 21, 2021 eGFRcr in adults is calculated using the 2021 CKD-EPI creatinine equation which includes age and gender (hPylicia et al., [place], DOI: 10.[facility]6/LRHHfm1563610)   08/26/2020 >90 >60 mL/min/[1.73_m2] Final     Comment:     Non  GFR Calc  Starting 12/18/2018, serum creatinine based estimated GFR (eGFR) will be   calculated using the Chronic Kidney Disease Epidemiology Collaboration   (CKD-EPI) equation.       GFR Estimate If Black   Date Value Ref [place] Status   08/26/2020 >90 >60 mL/min/[1.73_m2] Final     Comment:      GFR Calc  Starting 12/18/2018, serum creatinine based estimated GFR (eGFR) will be   calculated using the Chronic Kidney Disease Epidemiology Collaboration   (CKD-EPI) equation.       Lab Results   Component Value Date    CR 0.70 04/27/2022    CR 0.77 08/26/2020     No results found for: MICROALBUMIN    Healthy Eating:  Healthy Eating Assessed Today: Yes  Meal planning/habits: Smaller portions  Meals include: Breakfast, Lunch, Dinner, Morning Snack  Breakfast: 2 eggs with shredded cheddar and coffee  Lunch: leftovers (chicken stir taylor)  Dinner: protein, vegetables, rice, milk  Snacks: cottage cheese with Urdu dressin or nuts with Greek nonfat yogurt  Beverages: Water, Coffee, Milk, Alcohol  Has patient met with a dietitian in the past?: Yes    Being Active:  Being Active Assessed Today: Yes  Exercise:: Yes  Days per week of moderate to strenuous exercise (like a brisk walk): 7  On average, minutes per day of exercise at this level: 30  How intense was your typical exercise? : Moderate (like brisk walking)  Exercise Minutes per Week: 210    Monitoring:  Monitoring Assessed Today: No (not testing yet)  Did  patient bring glucose meter to appointment? : No    Taking Medications:  Taking Medication Assessed Today: No  Current Treatments: Diet    Problem Solving:  Is the patient at risk for hypoglycemia?: No     Reducing Risks:  Additional female risks: Gestational Diabetes  CAD Risks: Diabetes Mellitus, Obesity  Has dilated eye exam at least once a year?: No  Feet checked by healthcare provider in the last year?: No    Healthy Coping:  Healthy Coping Assessed Today: Yes  Emotional response to diabetes: Ready to learn, Confidence diabetes can be controlled  Informal Support system:: Children, Spouse  Stage of change: PREPARATION (Decided to change - considering how)  Support resources: Websites, Weight Management  Patient Activation Measure Survey Score:  HARVEY Score (Last Two) 7/1/2019   HARVEY Raw Score 37   Activation Score 79.2   HARVEY Level 4       Diabetes knowledge and skills assessment:   Patient is knowledgeable in diabetes management concepts related to: Healthy Eating and Being Active    Patient needs further education on the following diabetes management concepts: Healthy Eating, Being Active and Monitoring    Based on learning assessment above, most appropriate setting for further diabetes education would be: Individual setting.      INTERVENTIONS:    Education provided today on:  AADE Self-Care Behaviors:  Diabetes Pathophysiology  Healthy Eating: carbohydrate counting, consistency in amount, composition, and timing of food intake, weight reduction, portion control, plate planning method and label reading  Being Active: relationship to blood glucose, describe appropriate activity program and precautions to take  Monitoring: proper technique, log and interpret results and frequency of monitoring    Opportunities for ongoing education and support in diabetes-self management were discussed.    Pt verbalized understanding of concepts discussed and recommendations provided today.       Education Materials  Provided:  Contour Next One meter kit and Basics Type 2 book and My Food Plan      ASSESSMENT:  Paulette has been working on changes since diagnosis. Her  and daughter are supportive and working on food changes too.    Paulette has a history of GDM and feels comfortable with testing her BG. She does not want to take any diabetes medication. I did not discuss Ozempic with her at this visit. Will plan on doing so at next visit.    Paulette has been walking after work almost daily for about 30 minutes a day.        Patient's most recent   Lab Results   Component Value Date    A1C 6.6 04/27/2022    is meeting goal of <7.0    PLAN  See Patient Instructions for co-developed, patient-stated behavior change goals.  Test BG every AM before food/beverage and 2 hours after evening meal    Keep food logs - consider my Fitness Pal    Return to Diabetes Ed in one month    AVS printed and provided to patient today. See Follow-Up section for recommended follow-up.      Time Spent: 45 minutes  Encounter Type: Individual    Any diabetes medication dose changes were made via the [facility] Protocol and Collaborative Practice  Agreement with the patient's primary care provider. A copy of this encounter was shared with the provider.

## 2022-05-20 NOTE — PATIENT INSTRUCTIONS
Test BG every AM before food/beverage and 2 hours after evening meal    Keep food logs - consider my Fitness Pal    Return to Diabetes Ed in one month

## 2022-05-25 ENCOUNTER — E-VISIT (OUTPATIENT)
Dept: URGENT CARE | Facility: CLINIC | Age: 52
End: 2022-05-25

## 2022-05-25 DIAGNOSIS — J02.9 SORE THROAT: Primary | ICD-10-CM

## 2022-05-25 PROCEDURE — 99207 PR NON-BILLABLE SERV PER CHARTING: CPT | Performed by: PHYSICIAN ASSISTANT

## 2022-05-25 NOTE — PATIENT INSTRUCTIONS
Dear Paulette Flores,    We are sorry you are not feeling well. Based on the responses you provided, it is recommended that you be seen in-person in urgent care so we can better evaluate your symptoms. Please click here to find the nearest urgent care location to you.   You will not be charged for this Visit. Thank you for trusting us with your care.    Abimael Iglesias PA-C

## 2022-05-26 ENCOUNTER — TELEPHONE (OUTPATIENT)
Dept: FAMILY MEDICINE | Facility: OTHER | Age: 52
End: 2022-05-26

## 2022-05-26 ENCOUNTER — APPOINTMENT (OUTPATIENT)
Dept: CT IMAGING | Facility: HOSPITAL | Age: 52
End: 2022-05-26
Attending: NURSE PRACTITIONER
Payer: COMMERCIAL

## 2022-05-26 ENCOUNTER — HOSPITAL ENCOUNTER (EMERGENCY)
Facility: HOSPITAL | Age: 52
Discharge: HOME | End: 2022-05-26
Attending: NURSE PRACTITIONER | Admitting: NURSE PRACTITIONER
Payer: COMMERCIAL

## 2022-05-26 VITALS
OXYGEN SATURATION: 96 % | DIASTOLIC BLOOD PRESSURE: 97 MMHG | RESPIRATION RATE: 16 BRPM | SYSTOLIC BLOOD PRESSURE: 156 MMHG | TEMPERATURE: 97.3 F | HEART RATE: 66 BPM

## 2022-05-26 DIAGNOSIS — J35.8 TONSILLITH: Primary | ICD-10-CM

## 2022-05-26 DIAGNOSIS — J02.9 ACUTE SORE THROAT: ICD-10-CM

## 2022-05-26 LAB
ANION GAP SERPL CALCULATED.3IONS-SCNC: 5 MMOL/L (ref 3–14)
BUN SERPL-MCNC: 16 MG/DL (ref 7–30)
CALCIUM SERPL-MCNC: 9.1 MG/DL (ref 8.5–10.1)
CHLORIDE BLD-SCNC: 109 MMOL/L (ref 94–109)
CO2 SERPL-SCNC: 25 MMOL/L (ref 20–32)
CREAT SERPL-MCNC: 0.62 MG/DL (ref 0.52–1.04)
GFR SERPL CREATININE-BSD FRML MDRD: >90 ML/MIN/1.73M2
GLUCOSE BLD-MCNC: 110 MG/DL (ref 70–99)
GROUP A STREP BY PCR: NOT DETECTED
POTASSIUM BLD-SCNC: 4 MMOL/L (ref 3.4–5.3)
SODIUM SERPL-SCNC: 139 MMOL/L (ref 133–144)

## 2022-05-26 PROCEDURE — 82310 ASSAY OF CALCIUM: CPT | Performed by: NURSE PRACTITIONER

## 2022-05-26 PROCEDURE — 70491 CT SOFT TISSUE NECK W/DYE: CPT

## 2022-05-26 PROCEDURE — 250N000011 HC RX IP 250 OP 636: Performed by: RADIOLOGY

## 2022-05-26 PROCEDURE — 87651 STREP A DNA AMP PROBE: CPT | Performed by: NURSE PRACTITIONER

## 2022-05-26 PROCEDURE — 36415 COLL VENOUS BLD VENIPUNCTURE: CPT | Performed by: NURSE PRACTITIONER

## 2022-05-26 PROCEDURE — 99214 OFFICE O/P EST MOD 30 MIN: CPT | Performed by: NURSE PRACTITIONER

## 2022-05-26 PROCEDURE — G0463 HOSPITAL OUTPT CLINIC VISIT: HCPCS | Mod: 25

## 2022-05-26 RX ORDER — DEXAMETHASONE 6 MG/1
6 TABLET ORAL DAILY
Qty: 5 TABLET | Refills: 0 | Status: SHIPPED | OUTPATIENT
Start: 2022-05-26 | End: 2022-06-15

## 2022-05-26 RX ORDER — IOPAMIDOL 755 MG/ML
75 INJECTION, SOLUTION INTRAVASCULAR ONCE
Status: COMPLETED | OUTPATIENT
Start: 2022-05-26 | End: 2022-05-26

## 2022-05-26 RX ADMIN — IOPAMIDOL 75 ML: 755 INJECTION, SOLUTION INTRAVENOUS at 10:52

## 2022-05-26 ASSESSMENT — ENCOUNTER SYMPTOMS
SINUS PRESSURE: 0
CHILLS: 0
SORE THROAT: 1
COUGH: 0
SHORTNESS OF BREATH: 0
FEVER: 0
SINUS PAIN: 0

## 2022-05-26 NOTE — DISCHARGE INSTRUCTIONS
Take Tylenol as needed for pain.  Do salt water gargles.  Take the steroid as prescribed.    Schedule an appointment with ENT clinic for follow-up.      What to expect when you have contrast    During your exam, we will inject  contrast  into your vein or artery. (Contrast is a clear liquid with iodine in it. It shows up on X-rays.)    You may feel warm or hot. You may have a metal taste in your mouth and a slight upset stomach. You may also feel pressure near the kidneys and bladder. These effects will last about 1 to 3 minutes.    Please tell us if you have:   Sneezing    Itching   Hives    Swelling in the face   A hoarse voice   Breathing problems   Other new symptoms    Serious problems are rare.  They may include:   Irregular heartbeat    Seizures   Kidney failure             Tissue damage   Shock     Death    If you have any problems during the exam, we  will treat them right away.    When you get home    Call your hospital if you have any new symptoms in the next 2 days, like hives or swelling. (Phone numbers are at the bottom of this page.) Or call your family doctor.     If you have wheezing or trouble breathing, call 911.    Self-care  -Drink at least 4 extra glasses of water today.   This reduces the stress on your kidneys.  -Keep taking your regular medicines.    The contrast will pass out of your body in your  Urine(pee). This will happen in the next 24 hours. You  will not feel this. Your urine will not  change color.    If you have kidney problems or take metformin    Drink 4 to 8 large glasses of water for the next  2 days, if you are not on a fluid restriction.    ?If you take metformin (Glucophage or Glucovance) for diabetes, keep taking it.      ?Your kidney function tests are abnormal.  If you take Metformin, do not take it for 48 hours. Please go to your clinic for a blood test within 3 days after your exam before the restarting this medicine.     (Note to provider:please give patient  prescription for lab tests.)    ?Special instructions: -    I have read and understand the above information.    Patient Sign Here:______________________________________Date:________Time:______    Staff Sign Here:________________________________________Date:_______Time:______      Radiology Departments:     ?[facility] [place]: [phone] ?[place]: [phone]     ?[place]: [phone] ?[facility]:[phone]      ?[place]: [phone]  ?[place]: [phone]  ?[place]:[phone]    ?[facility] [place]:[phone]  ?[facility]:[phone]

## 2022-05-26 NOTE — ED TRIAGE NOTES
"Patient presents with complaints of sore throat.  States she has had a sore throat for more than 16 days.  Has been on antibioticx x 10 days with no improvement.  Did an at home covid test 7 days ago and was negative.  States it hurts to swallow but she is able to swallow; feels like \"golf balls\" in along the sides of her neck.  Voice is not muffled; patient without respiratory distress.,      "

## 2022-05-26 NOTE — TELEPHONE ENCOUNTER
7:55 AM    Reason for Call: OVERBOOK    Patient is having the following symptoms: Follow up - sinus infection for 10 days.    The patient is requesting an appointment for overbook for Friday 05/27/22 with Dr. Villareal. Pt states she too her last antibiotic from the sinus infection today, she still has a sore throat (sinuses seem to have cleared up)    Was an appointment offered for this call? Yes  If yes : Appointment type              Date 07/11/22 - declined to wait that long    Preferred method for responding to this message: Telephone Call  What is your phone number ? [phone]    If we cannot reach you directly, may we leave a detailed response at the number you provided? Yes    Can this message wait until your PCP/provider returns, if unavailable today? YES, pt is aware provider is out today and this o=is for tomorrow 05/27/22    Iman Rubi

## 2022-05-26 NOTE — ED PROVIDER NOTES
"  History     Chief Complaint   Patient presents with     Pharyngitis     HPI  Paulette Flores is a 51 year old female who presents ambulatory to urgent care for concerns of a sore throat.  Onset 16 days ago.  Patient tells me that she just completed cefprozil for a sinus infection.  When she did go and see her doctor he had noted he postnasal drainage.  Her sinus symptoms have since resolved.  She tells me that she feels like she has \"golf balls\" to her throat when she swallows.  Denies any fevers or chills.  She is still swallowing with minimal difficulty.  She does note that it hurts to swallow.  She had a home COVID test 1 week ago which came back negative.    Allergies:  Allergies   Allergen Reactions     Ace Inhibitors Cough     Penicillins      Rash as baby      Sulfa Drugs      SULFA (SULFONAMIDE ANTIBIOTICS)     Adhesive Tape Itching and Rash     Band aids.       Problem List:    Patient Active Problem List    Diagnosis Date Noted     Diabetes mellitus, type 2 (H) 04/27/2022     Priority: Medium     FH: CAD (coronary artery disease) 07/01/2019     Priority: Medium     Mixed hyperlipidemia 07/01/2019     Priority: Medium     GIN (obstructive sleep apnea) 12/11/2018     Priority: Medium     Morbid obesity (H) 08/06/2018     Priority: Medium     ACP (advance care planning) 07/20/2016     Priority: Medium     Advance Care Planning 7/20/2016: ACP Review of Chart / Resources Provided:  Reviewed chart for advance care plan.  Paulette Flores has been provided information and resources to begin or update their advance care plan.  Added by Katia Champagne           Essential hypertension with goal blood pressure less than 140/90 07/20/2016     Priority: Medium     Migraine 02/23/2001     Priority: Medium     Problem list name updated by automated process. Provider to review          Past Medical History:    Past Medical History:   Diagnosis Date     Abnormal glandular Papanicolaou smear of cervix 01/11/2002     " Dysmenorrhea 11/30/2006     Dysplasia of cervix, unspecified 04/25/2002     Habitual aborter, 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     Infections of genitourinary tract antepartum 05/27/2009     Metrorrhagia 12/06/2001     Migraine, unspecified, without mention of intractable migraine without mention of status migrainosus 02/23/2001     Pain in limb 08/22/2002     Unspecified disorder of female genital organs 12/13/2006     Unspecified hemorrhage in early pregnancy, antepartum 12/04/2006       Past Surgical History:    Past Surgical History:   Procedure Laterality Date     BREAST SURGERY       CHOLECYSTECTOMY       GYN SURGERY      laparoscopic supracervical hysterectomy,left tube and ovary removal,fulguration of endometriosis 8/28/13--[facility] [place]     LAPAROSCOPIC HYSTERECTOMY SUPRACERVICAL, BILATERAL SALPINGO-OOPHORECTOMY, COMBINED  8/28/2013    Procedure: COMBINED LAPAROSCOPIC HYSTERECTOMY SUPRACERVICAL, SALPINGO-OOPHORECTOMY;  LAPAROSCOPIC SUPRACERVICAL HYSTERECTOMY/LAPAROSCOPIC LEFT SALPINGO-OOPHORECTOMY;  Surgeon: Jerrell Thornton MD;  Location: [facility]     LAPAROSCOPIC SALPINGO-OOPHORECTOMY Right 11/22/2017    Procedure: LAPAROSCOPIC SALPINGO-OOPHORECTOMY;  LAPAROSCOPIC RIGHT SALPINGO-OOPHORECTOMY excision of right pelvic side wall mass;  Surgeon: Jerrell Thornton MD;  Location: [facility]     LAPAROSCOPY DIAGNOSTIC (GENERAL)       WISDOM TEETH         Family History:    Family History   Problem Relation Age of Onset     Myocardial Infarction Paternal Grandfather 45        MI, cause of death     Cancer Maternal Aunt         Cervical     Hypertension Maternal Grandmother      Osteoporosis Maternal Grandmother      Myocardial Infarction Father         in 60's / smoker      Myocardial Infarction Mother         MI vs CVA - age 68yO       Social History:  Marital Status:   [2]  Social History     Tobacco Use     Smoking status: Never Smoker     Smokeless tobacco: Never Used   Vaping Use     Vaping Use:  Never used   Substance Use Topics     Alcohol use: No     Alcohol/week: 0.0 standard drinks     Drug use: Never        Medications:    aspirin 81 MG chewable tablet  blood glucose (CONTOUR NEXT TEST) test strip  blood glucose monitoring (NO BRAND SPECIFIED) meter device kit  cefPROZIL (CEFZIL) 500 MG tablet  cyclobenzaprine (FLEXERIL) 5 MG tablet  dexamethasone (DECADRON) 6 MG tablet  ibuprofen (ADVIL/MOTRIN) 800 MG tablet  Microlet Lancets MISC  simvastatin (ZOCOR) 20 MG tablet  valsartan-hydrochlorothiazide (DIOVAN HCT) 80-12.5 MG tablet          Review of Systems   Constitutional: Negative for chills and fever.   HENT: Positive for sore throat. Negative for congestion, sinus pressure and sinus pain.    Respiratory: Negative for cough and shortness of breath.    All other systems reviewed and are negative.      Physical Exam   BP: 156/97  Pulse: 66  Temp: 97.3  F (36.3  C)  Resp: 16  SpO2: 96 %      Physical Exam  Vitals and nursing note reviewed.   Constitutional:       Appearance: She is well-developed. She is not ill-appearing or toxic-appearing.   HENT:      Head: Normocephalic.      Right Ear: Tympanic membrane and ear canal normal.      Left Ear: Tympanic membrane and ear canal normal.      Mouth/Throat:      Mouth: Mucous membranes are moist.      Pharynx: Oropharynx is clear. Uvula midline. No pharyngeal swelling, oropharyngeal exudate, posterior oropharyngeal erythema or uvula swelling.      Tonsils: No tonsillar exudate or tonsillar abscesses. 0 on the right. 0 on the left.   Eyes:      Pupils: Pupils are equal, round, and reactive to light.   Cardiovascular:      Rate and Rhythm: Normal rate and regular rhythm.      Heart sounds: Normal heart sounds.   Pulmonary:      Effort: Pulmonary effort is normal. No respiratory distress.      Breath sounds: Normal breath sounds. No wheezing, rhonchi or rales.   Musculoskeletal:      Cervical back: Normal range of motion and neck supple.   Lymphadenopathy:       "Cervical: No cervical adenopathy.   Skin:     General: Skin is warm and dry.      Capillary Refill: Capillary refill takes less than 2 seconds.   Neurological:      Mental Status: She is alert and oriented to person, place, and time.         ED Course                 Procedures              Results for orders placed or performed during the hospital encounter of 05/26/22 (from the past 24 hour(s))   Group A Streptococcus PCR Throat Swab    Specimen: Throat; Swab   Result Value Ref Range    Group A strep by PCR Not Detected Not Detected    Narrative    The Xpert Xpress Strep A test, performed on the [employer], is a rapid, qualitative in vitro diagnostic test for the detection of Streptococcus pyogenes (Group A ß-hemolytic Streptococcus, Strep A) in throat swab specimens from patients with signs and symptoms of pharyngitis. The Xpert Xpress Strep A test can be used as an aid in the diagnosis of Group A Streptococcal pharyngitis. The assay is not intended to monitor treatment for Group A Streptococcus infections. The Xpert Xpress Strep A test utilizes an automated real-time polymerase chain reaction (PCR) to detect Streptococcus pyogenes DNA.   Basic metabolic panel   Result Value Ref Range    Sodium 139 133 - 144 mmol/L    Potassium 4.0 3.4 - 5.3 mmol/L    Chloride 109 94 - 109 mmol/L    Carbon Dioxide (CO2) 25 20 - 32 mmol/L    Anion Gap 5 3 - 14 mmol/L    Urea Nitrogen 16 7 - 30 mg/dL    Creatinine 0.62 0.52 - 1.04 mg/dL    Calcium 9.1 8.5 - 10.1 mg/dL    Glucose 110 (H) 70 - 99 mg/dL    GFR Estimate >90 >60 mL/min/1.73m2   CT Soft Tissue Neck w Contrast    Narrative    Exam: CT SOFT TISSUE NECK W CONTRAST    Exam reason:  Tonsil/adenoid disorder; Epiglottitis or tonsillitis  suspected; sore throat x 16 days, recently on abx with no improvement,  feels like \"golf balls in throat when she swallows\"    Technique: Axial scans obtained of the neck after IV contrast  administration. Coronal and " "sagittal reconstructions performed.    Meds/Contrast: ISOVUE 370  75mL    Comparison: None.     FINDINGS:    Visualized brain parenchyma is normal. Mild mucosal thickening in the  inferior maxillary sinuses.    Parotid glands: Normal  Submandibular glands: Normal.  Sublingual spaces are symmetric.    Oral cavity and floor of mouth: Mildly obscured by artifact from  dental amalgam.  Nasopharynx: Normal  Oropharynx/hypopharynx/larynx: There are a few tonsilliths noted  bilaterally. No rim-enhancing fluid collections. Normal appearance of  the epiglottis.    Thyroid gland: Normal    Vessels: Normal    Lymph nodes: There are a couple of mildly prominent bilateral cervical  lymph nodes, likely reactive given the clinical history.    Osseous:  No acute osseous abnormalities.      Impression    IMPRESSION:  No mass or rim-enhancing fluid collection to suggest abscess. Normal  appearance of the epiglottis.    There are a couple of mildly prominent bilateral cervical lymph nodes  which are likely reactive given the clinical history.    RIYA BRADFORD MD         SYSTEM ID:  [id number]       Medications   sodium chloride (PF) 0.9% PF flush 60 mL (60 mLs Intravenous Given 5/26/22 1052)   iopamidol (ISOVUE-370) solution 75 mL (75 mLs Intravenous Given 5/26/22 1052)       Assessments & Plan (with Medical Decision Making)     I have reviewed the nursing notes.    Pleasant 51-year-old female that presented for evaluation of sore throat x16 days.  Patient is still swallowing okay but reports pain when swallowing and feeling like she has \"ball golf balls\" in her throat.  She is not running any fevers at home.  No tonsillar hypertrophy, tonsillar exudate or tonsillar abscess on exam.  No trismus.  Patient talking clearly.    She tested negative for strep.  CT scan soft tissue neck was obtained which does show some tonsil lifts bilaterally as well as reactive adenopathy, no abscess appreciated.    Discussed these findings with patient.  " She has had tonsil stones in the past but is always managed to remove them herself at home.  No appreciable tonsil stones on exam today.  We will treat her with short course of Decadron.  Recommended salt water gargles and Tylenol as needed for pain.  Referral placed to ENT for follow-up.  Return to ED/UC for worsening or concerning symptoms.    I have reviewed the findings, diagnosis, plan and need for follow up with the patient.  This document was prepared using a combination of typing and voice generated software.  While every attempt was made for accuracy, spelling and grammatical errors may exist.    New Prescriptions    DEXAMETHASONE (DECADRON) 6 MG TABLET    Take 1 tablet (6 mg) by mouth daily for 5 days       Final diagnoses:   Tonsillith   Acute sore throat       5/26/2022   [facility]     Mpofu, Prudence, CNP  05/26/22 [id number]

## 2022-05-26 NOTE — ED TRIAGE NOTES
patient presents to urgent care for sore throat for the last 16 days. Patient just finished antibiotics for a sinus infection but did not help the throat.  Home COVID test was negative. Patient would like a strep test done.

## 2022-05-27 ENCOUNTER — MYC MEDICAL ADVICE (OUTPATIENT)
Dept: FAMILY MEDICINE | Facility: OTHER | Age: 52
End: 2022-05-27
Payer: COMMERCIAL

## 2022-06-09 ENCOUNTER — TELEPHONE (OUTPATIENT)
Dept: SURGERY | Facility: OTHER | Age: 52
End: 2022-06-09
Payer: COMMERCIAL

## 2022-06-09 ENCOUNTER — PREP FOR PROCEDURE (OUTPATIENT)
Dept: SURGERY | Facility: OTHER | Age: 52
End: 2022-06-09
Payer: COMMERCIAL

## 2022-06-09 DIAGNOSIS — Z01.818 PREOP TESTING: Primary | ICD-10-CM

## 2022-06-09 DIAGNOSIS — Z12.11 SPECIAL SCREENING FOR MALIGNANT NEOPLASMS, COLON: ICD-10-CM

## 2022-06-09 DIAGNOSIS — Z12.11 SPECIAL SCREENING FOR MALIGNANT NEOPLASMS, COLON: Primary | ICD-10-CM

## 2022-06-09 RX ORDER — BISACODYL 5 MG
TABLET, DELAYED RELEASE (ENTERIC COATED) ORAL
Qty: 4 TABLET | Refills: 0 | Status: SHIPPED | OUTPATIENT
Start: 2022-06-09 | End: 2022-07-25

## 2022-06-09 NOTE — TELEPHONE ENCOUNTER
Referral received for colonoscopy for special screening for malignant neoplasms, colon.   This patient was not approved by Farzana, surgery education RN for meet and jordyet colonoscopy without preop appointment.   Patient scheduled for colonoscopy on 7/15/22 with Dr. Mcduffie at [facility] with Golytely bowel prep.   Instructions given via phone and sent to patient via mail.   COVID-19 test: 7/12/22  Preop: to be determined    Please sign RX for colon prep in this encounter.

## 2022-06-13 ENCOUNTER — OFFICE VISIT (OUTPATIENT)
Dept: CHIROPRACTIC MEDICINE | Facility: OTHER | Age: 52
End: 2022-06-13
Attending: CHIROPRACTOR
Payer: COMMERCIAL

## 2022-06-13 DIAGNOSIS — M99.02 SEGMENTAL AND SOMATIC DYSFUNCTION OF THORACIC REGION: ICD-10-CM

## 2022-06-13 DIAGNOSIS — M54.2 CERVICALGIA: ICD-10-CM

## 2022-06-13 DIAGNOSIS — M99.03 SEGMENTAL AND SOMATIC DYSFUNCTION OF LUMBAR REGION: ICD-10-CM

## 2022-06-13 DIAGNOSIS — M99.01 SEGMENTAL AND SOMATIC DYSFUNCTION OF CERVICAL REGION: Primary | ICD-10-CM

## 2022-06-13 PROCEDURE — 98941 CHIROPRACT MANJ 3-4 REGIONS: CPT | Mod: AT | Performed by: CHIROPRACTOR

## 2022-06-13 NOTE — PROGRESS NOTES
Subjective Finding:    Chief compalint: Patient presents with:  Back Pain  , Pain Scale: 5/10, Intensity: sharp, Duration: 1 week, Radiating: no.    Date of injury:     Activities that the pain restricts:   Home/household/hobbies/social activities: no.  Work duties: no.  Sleep: yes.  Makes symptoms better: rest.  Makes symptoms worse: activity and lumbar extension.  Have you seen anyone else for the symptoms? No.  Work related: no.  Automobile related injury: no.    Objective and Assessment:    Posture Analysis:   High shoulder: right.  Head tilt: right.  High iliac crest: right.  Head carriage: neutral.  Thoracic Kyphosis: neutral.  Lumbar Lordosis: forward.    Lumbar Range of Motion: flexion decreased and extension decreased.  Cervical Range of Motion: extension decreased.  Thoracic Range of Motion: .  Extremity Range of Motion: .    Palpation:   CT jxn     Segmental dysfunction pre-treatment and treatment area: C56  T4.  L45    Assessment post-treatment:  Cervical: ROM increased.  Thoracic: ROM increased.  Lumbar: ROM increased and pain and tenderness decreased.    Comments: .      Complicating Factors: .    Plan / Procedure:    Treatment plan: PRN.  Instructed patient: stretch as instructed at visit and walk 10 minutes.  Short term goals: reduce pain.  Long term goals: restore normal function.  Prognosis: excellent.

## 2022-06-15 ENCOUNTER — OFFICE VISIT (OUTPATIENT)
Dept: OTOLARYNGOLOGY | Facility: OTHER | Age: 52
End: 2022-06-15
Attending: NURSE PRACTITIONER
Payer: COMMERCIAL

## 2022-06-15 ENCOUNTER — TELEPHONE (OUTPATIENT)
Dept: SURGERY | Facility: OTHER | Age: 52
End: 2022-06-15

## 2022-06-15 VITALS
WEIGHT: 270 LBS | SYSTOLIC BLOOD PRESSURE: 138 MMHG | DIASTOLIC BLOOD PRESSURE: 84 MMHG | OXYGEN SATURATION: 98 % | BODY MASS INDEX: 39.99 KG/M2 | TEMPERATURE: 97.5 F | HEIGHT: 69 IN | HEART RATE: 61 BPM

## 2022-06-15 DIAGNOSIS — R13.19 ESOPHAGEAL DYSPHAGIA: Primary | ICD-10-CM

## 2022-06-15 DIAGNOSIS — R09.82 POST-NASAL DRAINAGE: ICD-10-CM

## 2022-06-15 PROCEDURE — 86003 ALLG SPEC IGE CRUDE XTRC EA: CPT | Mod: 90 | Performed by: NURSE PRACTITIONER

## 2022-06-15 PROCEDURE — 82785 ASSAY OF IGE: CPT | Mod: 90 | Performed by: NURSE PRACTITIONER

## 2022-06-15 PROCEDURE — 36415 COLL VENOUS BLD VENIPUNCTURE: CPT | Performed by: NURSE PRACTITIONER

## 2022-06-15 PROCEDURE — 99213 OFFICE O/P EST LOW 20 MIN: CPT | Mod: 25 | Performed by: NURSE PRACTITIONER

## 2022-06-15 PROCEDURE — 31575 DIAGNOSTIC LARYNGOSCOPY: CPT | Performed by: NURSE PRACTITIONER

## 2022-06-15 RX ORDER — FLUTICASONE PROPIONATE 50 MCG
2 SPRAY, SUSPENSION (ML) NASAL DAILY
Qty: 16 G | Refills: 11 | Status: SHIPPED | OUTPATIENT
Start: 2022-06-15 | End: 2023-02-03

## 2022-06-15 ASSESSMENT — PAIN SCALES - GENERAL: PAINLEVEL: NO PAIN (0)

## 2022-06-15 NOTE — LETTER
6/15/2022         RE: Paulette Flores  [address]  [place] [address]        Dear Colleague,    Thank you for referring your patient, Paulette Flores, to the [facility] [place]. Please see a copy of my visit note below.    Otolaryngology Note         Chief Complaint:     Patient presents with:  Pharyngitis: Tonsilleth, referred by Prudence Mpofu           History of Present Illness:     Paulette Flores is a 51 year old female seen today for concerns for chronic sore throat.      She was seen in  on 5/26/22 for a 2+ week history of sore throat.  The sore throat is sharp.  She feels trouble swallowing.  She also feels she has had sore tonsillar lymph nodes.  It started with a sinus infection.  She was treated with abx for sinus infection with improvement.  Symptoms of sinus infection: nasal congestion, facial pain or pressure, and bloody rhinorrhea.  She reports she has a history of recurrent sinusitis but has not had trouble since she started with CPAP 2-3 years ago.     She was seen by chiropractor and had CPAP adjusted.      She has a history of recurrent tonsil stones - she usually is able to manually remove them and the sore throat improves.      She reports tonsil stones about 1 time per month over the past several years.      She reports a history of GERD, she takes famotidine as needed with good improvement.  She last took it this week.      She able to breath through nose.  No jerald PND.    She reports some things getting stuck in her throat due to feeling of things getting stuck, she has to drink liquid to help clear things.  She states she does not eat any foods without having water to swallow it down as she is afraid it will get stuck.  Dry foods like bread and chicken tend to be worse.  Symptoms have been present for over 2 years and gradually worsening.      Appetite is good.     No chronic cough, she denies throat clearing  No shortness of breath       No history of sinus  injury/trauma/surgery  No chronic acid reflux, takes famotidine as needed  + history of strep/ tonsillitis.   No history of COM, otological surgery  She feels slight plugged sensation in the left ear at times  No concerns for seasonal allergies.  She has reaction to  etc. She uses more natural   No history of previous allergy testing  No asthma  No family history of allergies          Medications:     Current Outpatient Rx   Medication Sig Dispense Refill     aspirin 81 MG chewable tablet Take 1 tablet (81 mg) by mouth daily (Patient taking differently: Take 81 mg by mouth every other day) 90 tablet 3     bisacodyl (DULCOLAX) 5 MG EC tablet 2 tabs po at hs 2 night before surgery. 2 tabs at 3pm day before surgery. 4 tablet 0     blood glucose (CONTOUR NEXT TEST) test strip Use to test blood sugar 2 times daily 100 strip 11     blood glucose monitoring (NO BRAND SPECIFIED) meter device kit Use to test blood sugar 2 times daily or as directed. 1 kit 0     fluticasone (FLONASE) 50 MCG/ACT nasal spray Spray 2 sprays into both nostrils daily 16 g 11     ibuprofen (ADVIL/MOTRIN) 800 MG tablet Take 1 tablet (800 mg) by mouth every 8 hours as needed for moderate pain 60 tablet 0     Microlet Lancets MISC Use to test blood glucose twice a day 100 each 11     polyethylene glycol (GOLYTELY) 236 g suspension Drink 8 oz q 10 mins until jug is 1/2 empty 6pm night prior to surgery. Refrigerate. Repeat 6 hours prior to surgery. 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     Infections of genitourinary tract antepartum 05/27/2009     Metrorrhagia 12/06/2001     Migraine, unspecified, without mention of intractable migraine without mention of status migrainosus 02/23/2001    oral contraceptive related     Pain in limb 08/22/2002     Unspecified disorder of female genital organs 12/13/2006     Unspecified hemorrhage in early pregnancy, antepartum 12/04/2006            Past Surgical History:     Past Surgical History:   Procedure Laterality Date     BREAST SURGERY       CHOLECYSTECTOMY       GYN SURGERY      laparoscopic supracervical hysterectomy,left tube and ovary removal,fulguration of endometriosis 8/28/13--[facility] [place]     LAPAROSCOPIC HYSTERECTOMY SUPRACERVICAL, BILATERAL SALPINGO-OOPHORECTOMY, COMBINED  8/28/2013    Procedure: COMBINED LAPAROSCOPIC HYSTERECTOMY SUPRACERVICAL, SALPINGO-OOPHORECTOMY;  LAPAROSCOPIC SUPRACERVICAL HYSTERECTOMY/LAPAROSCOPIC LEFT SALPINGO-OOPHORECTOMY;  Surgeon: Jerrell Thornton MD;  Location: [facility]     LAPAROSCOPIC SALPINGO-OOPHORECTOMY Right 11/22/2017    Procedure: LAPAROSCOPIC SALPINGO-OOPHORECTOMY;  LAPAROSCOPIC RIGHT SALPINGO-OOPHORECTOMY excision of right pelvic side wall mass;  Surgeon: Jerrell Thornton MD;  Location: [facility]     LAPAROSCOPY DIAGNOSTIC (GENERAL)       WISDOM TEETH         ENT family history reviewed         Social History:     Social History     Tobacco Use     Smoking status: Never Smoker     Smokeless tobacco: Never Used   Vaping Use     Vaping Use: Never used   Substance Use Topics     Alcohol use: No     Alcohol/week: 0.0 standard drinks     Drug use: Never            Review of Systems:     ROS: See HPI         Physical Exam:     /84 (Cuff Size: Adult Large)   Pulse 61   Temp 97.5  F (36.4  C) (Tympanic)   Ht 1.753 m (5' 9\")   Wt 122.5 kg (270 lb)   LMP 08/18/2013   SpO2 98%   BMI 39.87 kg/m      General - The " patient is well nourished and well developed, and appears to have good nutritional status.  Alert and oriented to person and place, answers questions and cooperates with examination appropriately.   Head and Face - Normocephalic and atraumatic, with no gross asymmetry noted.  The facial nerve is intact, with strong symmetric movements.  Voice and Breathing - The patient was breathing comfortably without the use of accessory muscles. There was no wheezing, stridor, or stertor.  The patients voice was clear and strong, and had appropriate pitch and quality.  Ears -The external auditory canals are patent, the tympanic membranes are intact without effusion, retraction or mass.  Bony landmarks are intact.  Eyes - Extraocular movements intact, sclera were not icteric or injected, conjunctiva were pink and moist.  Mouth - Examination of the oral cavity showed pink, healthy oral mucosa. No lesions or ulcerations noted.  The tongue was mobile and midline, and the dentition were in good condition.    Throat - The walls of the oropharynx were smooth, pink, moist, symmetric, and had no lesions or ulcerations.  The tonsillar pillars and soft palate were symmetric.  The uvula was midline on elevation.   Neck - No worrisome lymphadenopathy.   Palpation of the thyroid was soft and smooth, with no nodules or goiter appreciated.  The trachea was mobile and midline.  Nose - External contour is symmetric, no gross deflection or scars.  The nasal passages are examined with nasal speculum.   Nasal mucosa is pink and moist with no abnormal mucus.   No polyps, masses, or purulence noted on examination.    Attempts at mirror laryngoscopy were not possible due to gag reflex.  Therefore I proceeded with a fiberoptic examination after informed consent.  First I sprayed both sides of the nose with a mixture of lidocaine and neosynephrine.  I then passed the scope through the nasal cavity.     The nasopharynx was mucosally covered and symmetric.   The eustachian tube openings were unobstructed.  Going further down I had a clear view of the base of tongue which had normal appearing lingual tonsillar tissue.  The base of tongue was free of lesions, and the vallecula was open.  The epiglottis was smooth and mucosally covered.  The supraglottic larynx was then clearly visualized.  The vocal cords moved smoothly and symmetrically and were pearly white.  The pyriform sinuses were open and without jerald mass or pooling of secretions upon valsalva, and the limited view of the postcricoid region did not show any lesions.  The patient tolerated the procedure well.              Assessment and Plan:       ICD-10-CM    1. Esophageal dysphagia  R13.19 XR Esophagram     Adult General Surg Referral   2. Post-nasal drainage  R09.82 fluticasone (FLONASE) 50 MCG/ACT nasal spray     Total IgE ([employer]olab)     Inhalent Panel [place] Region (Serolab)     Total IgE (Serolab)     Inhalent Panel [place] (Serolab)       Start fluticasone (FLONASE) 50 MCG/ACT nasal spray, Spray 2 sprays into both nostrils daily    Start an over the counter antihistamine take as directed     Referral sent to General Surgery for EGD     Go to Lab to get Environmental Inhalent IgE blood draw, nurse will call you with results     Ordered referral for a Barium Swallow (XR Esophagram) to be done externally, someone will call you to schedule     Follow up in 2-3 months with Joyce Lawson for a recheck     Joyce TORRES  [facility]          Again, thank you for allowing me to participate in the care of your patient.        Sincerely,        Joyce Lawson NP

## 2022-06-15 NOTE — PROGRESS NOTES
Otolaryngology Note         Chief Complaint:     Patient presents with:  Pharyngitis: Tonsilleth, referred by Teresa Rodas           History of Present Illness:     Paulette Flores is a 51 year old female seen today for concerns for chronic sore throat.      She was seen in  on 5/26/22 for a 2+ week history of sore throat.  The sore throat is sharp.  She feels trouble swallowing.  She also feels she has had sore tonsillar lymph nodes.  It started with a sinus infection.  She was treated with abx for sinus infection with improvement.  Symptoms of sinus infection: nasal congestion, facial pain or pressure, and bloody rhinorrhea.  She reports she has a history of recurrent sinusitis but has not had trouble since she started with CPAP 2-3 years ago.     She was seen by chiropractor and had CPAP adjusted.      She has a history of recurrent tonsil stones - she usually is able to manually remove them and the sore throat improves.      She reports tonsil stones about 1 time per month over the past several years.      She reports a history of GERD, she takes famotidine as needed with good improvement.  She last took it this week.      She able to breath through nose.  No jerald PND.    She reports some things getting stuck in her throat due to feeling of things getting stuck, she has to drink liquid to help clear things.  She states she does not eat any foods without having water to swallow it down as she is afraid it will get stuck.  Dry foods like bread and chicken tend to be worse.  Symptoms have been present for over 2 years and gradually worsening.      Appetite is good.     No chronic cough, she denies throat clearing  No shortness of breath       No history of sinus injury/trauma/surgery  No chronic acid reflux, takes famotidine as needed  + history of strep/ tonsillitis.   No history of COM, otological surgery  She feels slight plugged sensation in the left ear at times  No concerns for seasonal allergies.  She has  reaction to  etc. She uses more natural   No history of previous allergy testing  No asthma  No family history of allergies          Medications:     Current Outpatient Rx   Medication Sig Dispense Refill     aspirin 81 MG chewable tablet Take 1 tablet (81 mg) by mouth daily (Patient taking differently: Take 81 mg by mouth every other day) 90 tablet 3     bisacodyl (DULCOLAX) 5 MG EC tablet 2 tabs po at hs 2 night before surgery. 2 tabs at 3pm day before surgery. 4 tablet 0     blood glucose (CONTOUR NEXT TEST) test strip Use to test blood sugar 2 times daily 100 strip 11     blood glucose monitoring (NO BRAND SPECIFIED) meter device kit Use to test blood sugar 2 times daily or as directed. 1 kit 0     fluticasone (FLONASE) 50 MCG/ACT nasal spray Spray 2 sprays into both nostrils daily 16 g 11     ibuprofen (ADVIL/MOTRIN) 800 MG tablet Take 1 tablet (800 mg) by mouth every 8 hours as needed for moderate pain 60 tablet 0     Microlet Lancets MISC Use to test blood glucose twice a day 100 each 11     polyethylene glycol (GOLYTELY) 236 g suspension Drink 8 oz q 10 mins until jug is 1/2 empty 6pm night prior to surgery. Refrigerate. Repeat 6 hours prior to surgery. 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     Infections of genitourinary tract antepartum 05/27/2009     Metrorrhagia 12/06/2001     Migraine, unspecified, without mention of intractable migraine  "without mention of status migrainosus 02/23/2001    oral contraceptive related     Pain in limb 08/22/2002     Unspecified disorder of female genital organs 12/13/2006     Unspecified hemorrhage in early pregnancy, antepartum 12/04/2006            Past Surgical History:     Past Surgical History:   Procedure Laterality Date     BREAST SURGERY       CHOLECYSTECTOMY       GYN SURGERY      laparoscopic supracervical hysterectomy,left tube and ovary removal,fulguration of endometriosis 8/28/13--[facility] [place]     LAPAROSCOPIC HYSTERECTOMY SUPRACERVICAL, BILATERAL SALPINGO-OOPHORECTOMY, COMBINED  8/28/2013    Procedure: COMBINED LAPAROSCOPIC HYSTERECTOMY SUPRACERVICAL, SALPINGO-OOPHORECTOMY;  LAPAROSCOPIC SUPRACERVICAL HYSTERECTOMY/LAPAROSCOPIC LEFT SALPINGO-OOPHORECTOMY;  Surgeon: Jerrell Thornton MD;  Location: [facility]     LAPAROSCOPIC SALPINGO-OOPHORECTOMY Right 11/22/2017    Procedure: LAPAROSCOPIC SALPINGO-OOPHORECTOMY;  LAPAROSCOPIC RIGHT SALPINGO-OOPHORECTOMY excision of right pelvic side wall mass;  Surgeon: Jerrell Thornton MD;  Location: [facility] OR     LAPAROSCOPY DIAGNOSTIC (GENERAL)       WISDOM TEETH         ENT family history reviewed         Social History:     Social History     Tobacco Use     Smoking status: Never Smoker     Smokeless tobacco: Never Used   Vaping Use     Vaping Use: Never used   Substance Use Topics     Alcohol use: No     Alcohol/week: 0.0 standard drinks     Drug use: Never            Review of Systems:     ROS: See HPI         Physical Exam:     /84 (Cuff Size: Adult Large)   Pulse 61   Temp 97.5  F (36.4  C) (Tympanic)   Ht 1.753 m (5' 9\")   Wt 122.5 kg (270 lb)   LMP 08/18/2013   SpO2 98%   BMI 39.87 kg/m      General - The patient is well nourished and well developed, and appears to have good nutritional status.  Alert and oriented to person and place, answers questions and cooperates with examination appropriately.   Head and Face - Normocephalic and atraumatic, with no " gross asymmetry noted.  The facial nerve is intact, with strong symmetric movements.  Voice and Breathing - The patient was breathing comfortably without the use of accessory muscles. There was no wheezing, stridor, or stertor.  The patients voice was clear and strong, and had appropriate pitch and quality.  Ears -The external auditory canals are patent, the tympanic membranes are intact without effusion, retraction or mass.  Bony landmarks are intact.  Eyes - Extraocular movements intact, sclera were not icteric or injected, conjunctiva were pink and moist.  Mouth - Examination of the oral cavity showed pink, healthy oral mucosa. No lesions or ulcerations noted.  The tongue was mobile and midline, and the dentition were in good condition.    Throat - The walls of the oropharynx were smooth, pink, moist, symmetric, and had no lesions or ulcerations.  The tonsillar pillars and soft palate were symmetric.  The uvula was midline on elevation.   Neck - No worrisome lymphadenopathy.   Palpation of the thyroid was soft and smooth, with no nodules or goiter appreciated.  The trachea was mobile and midline.  Nose - External contour is symmetric, no gross deflection or scars.  The nasal passages are examined with nasal speculum.   Nasal mucosa is pink and moist with no abnormal mucus.   No polyps, masses, or purulence noted on examination.    Attempts at mirror laryngoscopy were not possible due to gag reflex.  Therefore I proceeded with a fiberoptic examination after informed consent.  First I sprayed both sides of the nose with a mixture of lidocaine and neosynephrine.  I then passed the scope through the nasal cavity.     The nasopharynx was mucosally covered and symmetric.  The eustachian tube openings were unobstructed.  Going further down I had a clear view of the base of tongue which had normal appearing lingual tonsillar tissue.  The base of tongue was free of lesions, and the vallecula was open.  The epiglottis was  smooth and mucosally covered.  The supraglottic larynx was then clearly visualized.  The vocal cords moved smoothly and symmetrically and were pearly white.  The pyriform sinuses were open and without jerald mass or pooling of secretions upon valsalva, and the limited view of the postcricoid region did not show any lesions.  The patient tolerated the procedure well.              Assessment and Plan:       ICD-10-CM    1. Esophageal dysphagia  R13.19 XR Esophagram     Adult General Surg Referral   2. Post-nasal drainage  R09.82 fluticasone (FLONASE) 50 MCG/ACT nasal spray     Total IgE (Serolab)     Inhalent Panel [place] (Serolab)     Total IgE (Serolab)     Inhalent Panel [place] (Serolab)       Start fluticasone (FLONASE) 50 MCG/ACT nasal spray, Spray 2 sprays into both nostrils daily    Start an over the counter antihistamine take as directed     Referral sent to General Surgery for EGD     Go to Lab to get Environmental Inhalent IgE blood draw, nurse will call you with results     Ordered referral for a Barium Swallow (XR Esophagram) to be done externally, someone will call you to schedule     Follow up in 2-3 months with Joyce Lawson for a recheck     Joyce Lawson NP-C  [facility]NT

## 2022-06-15 NOTE — NURSING NOTE
"Chief Complaint   Patient presents with     Pharyngitis     Tonsilleth, referred by Prudence Mpofu       Initial /84 (Cuff Size: Adult Large)   Pulse 61   Temp 97.5  F (36.4  C) (Tympanic)   Ht 1.753 m (5' 9\")   Wt 122.5 kg (270 lb)   LMP 08/18/2013   SpO2 98%   BMI 39.87 kg/m   Estimated body mass index is 39.87 kg/m  as calculated from the following:    Height as of this encounter: 1.753 m (5' 9\").    Weight as of this encounter: 122.5 kg (270 lb).  Medication Reconciliation: complete  Patricia Morris LPN    "

## 2022-06-15 NOTE — PATIENT INSTRUCTIONS
Thank you for allowing Joyce Lawson and our ENT team to participate in your care.  If your medications are too expensive, please give the nurse a call.  We can possibly change this medication.  If you have a scheduling or an appointment question please contact our Health Unit Coordinator at their direct line [phone].   ALL nursing questions or concerns can be directed to your ENT nurse at: [phone] - Esi     Teri Med Nasal Saline  Use high volume teri med saline irrigation.  Use warm distilled water and 2 packets of the salt solution that comes with the bottle, dissolve in bottle up to 240 ml aly.  Irrigate each side of your nose leaning over the sink, using 1/3 to 1/2 the volume of the bottle in each nostril every irrigation.  Irrigate 5 times daily and as needed.      Start fluticasone (FLONASE) 50 MCG/ACT nasal spray, Spray 2 sprays into both nostrils daily    Start an over the counter antihistamine take as directed     Referral sent to General Surgery for EGD     Go to Lab to get Environmental Inhalent IgE blood draw, nurse will call you with results     Ordered referral for a Barium Swallow (XR Esophagram) to be done externally, someone will call you to schedule     Follow up in 2-3 months with Joyce Lawson for a recheck

## 2022-06-15 NOTE — TELEPHONE ENCOUNTER
Referral received from Joyce Lawson NP for patient to have upper endoscopy for esophageal dysphagia. Patient is scheduled for a colonoscopy on 7/15/22 with . Call was placed to patient, and she will have an upper endoscopy added to her current procedure. Tiffani will be sent a staff message letting her know of the change. Leny Phelps LPN

## 2022-06-24 ENCOUNTER — HOSPITAL ENCOUNTER (OUTPATIENT)
Dept: GENERAL RADIOLOGY | Facility: OTHER | Age: 52
Discharge: HOME | End: 2022-06-24
Attending: NURSE PRACTITIONER | Admitting: NURSE PRACTITIONER
Payer: COMMERCIAL

## 2022-06-24 DIAGNOSIS — R13.19 ESOPHAGEAL DYSPHAGIA: ICD-10-CM

## 2022-06-24 PROCEDURE — 74220 X-RAY XM ESOPHAGUS 1CNTRST: CPT

## 2022-06-24 PROCEDURE — 255N000001 HC RX 255: Performed by: NURSE PRACTITIONER

## 2022-06-24 RX ADMIN — ANTACID/ANTIFLATULENT 4 G: 380; 550; 10; 10 GRANULE, EFFERVESCENT ORAL at 09:53

## 2022-07-05 LAB
SCANNED LAB RESULT: NORMAL
SCANNED LAB RESULT: NORMAL

## 2022-07-06 ENCOUNTER — OFFICE VISIT (OUTPATIENT)
Dept: CHIROPRACTIC MEDICINE | Facility: OTHER | Age: 52
End: 2022-07-06
Attending: CHIROPRACTOR
Payer: COMMERCIAL

## 2022-07-06 DIAGNOSIS — M99.02 SEGMENTAL AND SOMATIC DYSFUNCTION OF THORACIC REGION: Primary | ICD-10-CM

## 2022-07-06 DIAGNOSIS — M99.03 SEGMENTAL AND SOMATIC DYSFUNCTION OF LUMBAR REGION: ICD-10-CM

## 2022-07-06 DIAGNOSIS — M99.01 SEGMENTAL AND SOMATIC DYSFUNCTION OF CERVICAL REGION: ICD-10-CM

## 2022-07-06 DIAGNOSIS — M54.50 ACUTE BILATERAL LOW BACK PAIN WITHOUT SCIATICA: ICD-10-CM

## 2022-07-06 PROCEDURE — 98941 CHIROPRACT MANJ 3-4 REGIONS: CPT | Mod: AT | Performed by: CHIROPRACTOR

## 2022-07-06 NOTE — RESULT ENCOUNTER NOTE
Telephone encounter from Leny Phelps on 6/15 reports that the Colonoscopy was changed to EGD and Colonoscopy.  If she has questions, please give her # to surgical nurses.  Thanks Sharona

## 2022-07-07 ENCOUNTER — ANESTHESIA EVENT (OUTPATIENT)
Dept: SURGERY | Facility: HOSPITAL | Age: 52
End: 2022-07-07
Payer: COMMERCIAL

## 2022-07-07 NOTE — ANESTHESIA PREPROCEDURE EVALUATION
Anesthesia Pre-Procedure Evaluation    Patient: Paulette Flores   MRN: [id number] : 1970        Procedure : Procedure(s):  Upper Endoscopy and Colonoscopy with possible biopsy, possible polypectomy          Past Medical History:   Diagnosis Date     Abnormal glandular Papanicolaou smear of cervix 2002    also 2003     Dysmenorrhea 2006     Dysplasia of cervix, unspecified 2002     Habitual aborter, antepartum condition or complica 2009     Infections of genitourinary tract antepartum 2009     Metrorrhagia 2001     Migraine, unspecified, without mention of intractable migraine without mention of status migrainosus 2001    oral contraceptive related     Pain in limb 2002     Unspecified disorder of female genital organs 2006     Unspecified hemorrhage in early pregnancy, antepartum 2006      Past Surgical History:   Procedure Laterality Date     BREAST SURGERY       CHOLECYSTECTOMY       GYN SURGERY      laparoscopic supracervical hysterectomy,left tube and ovary removal,fulguration of endometriosis 13--[facility] [place]     LAPAROSCOPIC HYSTERECTOMY SUPRACERVICAL, BILATERAL SALPINGO-OOPHORECTOMY, COMBINED  2013    Procedure: COMBINED LAPAROSCOPIC HYSTERECTOMY SUPRACERVICAL, SALPINGO-OOPHORECTOMY;  LAPAROSCOPIC SUPRACERVICAL HYSTERECTOMY/LAPAROSCOPIC LEFT SALPINGO-OOPHORECTOMY;  Surgeon: Jerrell Thornton MD;  Location: [facility]     LAPAROSCOPIC SALPINGO-OOPHORECTOMY Right 2017    Procedure: LAPAROSCOPIC SALPINGO-OOPHORECTOMY;  LAPAROSCOPIC RIGHT SALPINGO-OOPHORECTOMY excision of right pelvic side wall mass;  Surgeon: Jerrell Thornton MD;  Location: [facility] OR     LAPAROSCOPY DIAGNOSTIC (GENERAL)       WISDOM TEETH        Allergies   Allergen Reactions     Ace Inhibitors Cough     Penicillins      Rash as baby      Sulfa Drugs      SULFA (SULFONAMIDE ANTIBIOTICS)     Adhesive Tape Itching and Rash     Band aids.      Social History      Tobacco Use     Smoking status: Never Smoker     Smokeless tobacco: Never Used   Substance Use Topics     Alcohol use: No     Alcohol/week: 0.0 standard drinks      Wt Readings from Last 1 Encounters:   06/15/22 122.5 kg (270 lb)        Anesthesia Evaluation   Pt has had prior anesthetic. Type: MAC and General.    No history of anesthetic complications       ROS/MED HX  ENT/Pulmonary: Comment: tonsillith   pharyngitits  Recurrent sinusitis   Dysphagia     (+) sleep apnea, uses CPAP,     Neurologic:     (+) migraines,     Cardiovascular:     (+) Dyslipidemia hypertension-----Previous cardiac testing   Echo: Date: Results:    Stress Test: Date: Results:    ECG Reviewed: Date: 4/26/22 Results:  SB  Cath: Date: Results:      METS/Exercise Tolerance:     Hematologic:  - neg hematologic  ROS     Musculoskeletal:  - neg musculoskeletal ROS     GI/Hepatic: Comment: Esophageal dysphagia    (+) GERD (famotidine), Asymptomatic on medication,     Renal/Genitourinary: Comment: Dysmenorrhea  Habitual Aborter  Metrorrhagia   Dysplasia cervix  Abnormal pap  - neg Renal ROS     Endo:     (+) type II DM, Last HgA1c: 6.6, date: 7/27/22, Not using insulin, Obesity,     Psychiatric/Substance Use:  - neg psychiatric ROS     Infectious Disease:  - neg infectious disease ROS     Malignancy:  - neg malignancy ROS     Other:  - neg other ROS          Physical Exam    Airway  airway exam normal      Mallampati: III   TM distance: > 3 FB   Neck ROM: full   Mouth opening: > 3 cm    Respiratory Devices and Support         Dental  no notable dental history         Cardiovascular   cardiovascular exam normal       Rhythm and rate: regular and normal     Pulmonary   pulmonary exam normal        breath sounds clear to auscultation           OUTSIDE LABS:  CBC:   Lab Results   Component Value Date    WBC 5.9 04/27/2022    WBC 7.4 08/26/2020    HGB 14.2 04/27/2022    HGB 13.3 08/26/2020    HCT 43.0 04/27/2022    HCT 40.2 08/26/2020      04/27/2022     08/26/2020     BMP:   Lab Results   Component Value Date     05/26/2022     04/27/2022    POTASSIUM 4.0 05/26/2022    POTASSIUM 3.9 04/27/2022    CHLORIDE 109 05/26/2022    CHLORIDE 107 04/27/2022    CO2 25 05/26/2022    CO2 24 04/27/2022    BUN 16 05/26/2022    BUN 12 04/27/2022    CR 0.62 05/26/2022    CR 0.70 04/27/2022     (H) 05/26/2022     (H) 04/27/2022     COAGS: No results found for: PTT, INR, FIBR  POC:   Lab Results   Component Value Date    HCG Negative 08/28/2013    HCGS Negative 11/22/2017     HEPATIC:   Lab Results   Component Value Date    ALBUMIN 3.9 04/27/2022    PROTTOTAL 7.5 04/27/2022    ALT 93 (H) 04/27/2022    AST 44 04/27/2022    ALKPHOS 56 04/27/2022    BILITOTAL 0.4 04/27/2022     OTHER:   Lab Results   Component Value Date    LACT 1.5 05/19/2018    A1C 6.6 (H) 04/27/2022    HARVEY 9.1 05/26/2022    LIPASE 99 05/19/2018    AMYLASE 19 (L) 05/19/2018    TSH 2.90 04/27/2022    CRP 7.7 05/19/2018       Anesthesia Plan    ASA Status:  3   NPO Status:  NPO Appropriate    Anesthesia Type: MAC.     - Reason for MAC: straight local not clinically adequate, chronic cardiopulmonary disease              Consents    Anesthesia Plan(s) and associated risks, benefits, and realistic alternatives discussed. Questions answered and patient/representative(s) expressed understanding.     - Discussed: Risks, Benefits and Alternatives for BOTH SEDATION and the PROCEDURE were discussed     - Discussed with:  Patient      - Extended Intubation/Ventilatory Support Discussed: No.      - Patient is DNR/DNI Status: No    Use of blood products discussed: No .     Postoperative Care            Comments:    Other Comments: H&P 7/12 JAE Moses CRNA

## 2022-07-12 ENCOUNTER — LAB (OUTPATIENT)
Dept: LAB | Facility: OTHER | Age: 52
End: 2022-07-12
Attending: FAMILY MEDICINE
Payer: COMMERCIAL

## 2022-07-12 ENCOUNTER — ALLIED HEALTH/NURSE VISIT (OUTPATIENT)
Dept: FAMILY MEDICINE | Facility: OTHER | Age: 52
End: 2022-07-12
Attending: FAMILY MEDICINE
Payer: COMMERCIAL

## 2022-07-12 VITALS
SYSTOLIC BLOOD PRESSURE: 129 MMHG | TEMPERATURE: 97.9 F | HEART RATE: 85 BPM | DIASTOLIC BLOOD PRESSURE: 75 MMHG | BODY MASS INDEX: 40.32 KG/M2 | RESPIRATION RATE: 18 BRPM | WEIGHT: 273 LBS | OXYGEN SATURATION: 97 %

## 2022-07-12 DIAGNOSIS — Z82.49 FH: CAD (CORONARY ARTERY DISEASE): ICD-10-CM

## 2022-07-12 DIAGNOSIS — R13.19 ESOPHAGEAL DYSPHAGIA: ICD-10-CM

## 2022-07-12 DIAGNOSIS — E66.01 MORBID OBESITY (H): ICD-10-CM

## 2022-07-12 DIAGNOSIS — Z12.11 SPECIAL SCREENING FOR MALIGNANT NEOPLASMS, COLON: ICD-10-CM

## 2022-07-12 DIAGNOSIS — Z01.818 PREOP GENERAL PHYSICAL EXAM: ICD-10-CM

## 2022-07-12 DIAGNOSIS — I10 ESSENTIAL HYPERTENSION WITH GOAL BLOOD PRESSURE LESS THAN 140/90: ICD-10-CM

## 2022-07-12 DIAGNOSIS — E11.9 TYPE 2 DIABETES MELLITUS WITHOUT COMPLICATION, WITHOUT LONG-TERM CURRENT USE OF INSULIN (H): ICD-10-CM

## 2022-07-12 DIAGNOSIS — Z01.818 PREOP TESTING: ICD-10-CM

## 2022-07-12 DIAGNOSIS — Z01.818 PREOP GENERAL PHYSICAL EXAM: Primary | ICD-10-CM

## 2022-07-12 LAB
ANION GAP SERPL CALCULATED.3IONS-SCNC: 5 MMOL/L (ref 3–14)
BASOPHILS # BLD AUTO: 0.1 10E3/UL (ref 0–0.2)
BASOPHILS NFR BLD AUTO: 1 %
BUN SERPL-MCNC: 14 MG/DL (ref 7–30)
CALCIUM SERPL-MCNC: 9.3 MG/DL (ref 8.5–10.1)
CHLORIDE BLD-SCNC: 107 MMOL/L (ref 94–109)
CO2 SERPL-SCNC: 26 MMOL/L (ref 20–32)
CREAT SERPL-MCNC: 0.7 MG/DL (ref 0.52–1.04)
EOSINOPHIL # BLD AUTO: 0.3 10E3/UL (ref 0–0.7)
EOSINOPHIL NFR BLD AUTO: 5 %
ERYTHROCYTE [DISTWIDTH] IN BLOOD BY AUTOMATED COUNT: 13.9 % (ref 10–15)
GFR SERPL CREATININE-BSD FRML MDRD: >90 ML/MIN/1.73M2
GLUCOSE BLD-MCNC: 165 MG/DL (ref 70–99)
HCT VFR BLD AUTO: 41.1 % (ref 35–47)
HGB BLD-MCNC: 13.4 G/DL (ref 11.7–15.7)
IMM GRANULOCYTES # BLD: 0 10E3/UL
IMM GRANULOCYTES NFR BLD: 0 %
LYMPHOCYTES # BLD AUTO: 1.7 10E3/UL (ref 0.8–5.3)
LYMPHOCYTES NFR BLD AUTO: 27 %
MCH RBC QN AUTO: 29 PG (ref 26.5–33)
MCHC RBC AUTO-ENTMCNC: 32.6 G/DL (ref 31.5–36.5)
MCV RBC AUTO: 89 FL (ref 78–100)
MONOCYTES # BLD AUTO: 0.4 10E3/UL (ref 0–1.3)
MONOCYTES NFR BLD AUTO: 6 %
NEUTROPHILS # BLD AUTO: 4 10E3/UL (ref 1.6–8.3)
NEUTROPHILS NFR BLD AUTO: 61 %
NRBC # BLD AUTO: 0 10E3/UL
NRBC BLD AUTO-RTO: 0 /100
PLATELET # BLD AUTO: 204 10E3/UL (ref 150–450)
POTASSIUM BLD-SCNC: 3.8 MMOL/L (ref 3.4–5.3)
RBC # BLD AUTO: 4.62 10E6/UL (ref 3.8–5.2)
SODIUM SERPL-SCNC: 138 MMOL/L (ref 133–144)
WBC # BLD AUTO: 6.5 10E3/UL (ref 4–11)

## 2022-07-12 PROCEDURE — 80048 BASIC METABOLIC PNL TOTAL CA: CPT

## 2022-07-12 PROCEDURE — 99214 OFFICE O/P EST MOD 30 MIN: CPT | Performed by: FAMILY MEDICINE

## 2022-07-12 PROCEDURE — 36415 COLL VENOUS BLD VENIPUNCTURE: CPT

## 2022-07-12 PROCEDURE — 85025 COMPLETE CBC W/AUTO DIFF WBC: CPT

## 2022-07-12 PROCEDURE — U0005 INFEC AGEN DETEC AMPLI PROBE: HCPCS

## 2022-07-12 PROCEDURE — U0003 INFECTIOUS AGENT DETECTION BY NUCLEIC ACID (DNA OR RNA); SEVERE ACUTE RESPIRATORY SYNDROME CORONAVIRUS 2 (SARS-COV-2) (CORONAVIRUS DISEASE [COVID-19]), AMPLIFIED PROBE TECHNIQUE, MAKING USE OF HIGH THROUGHPUT TECHNOLOGIES AS DESCRIBED BY CMS-2020-01-R: HCPCS

## 2022-07-12 ASSESSMENT — PAIN SCALES - GENERAL: PAINLEVEL: NO PAIN (0)

## 2022-07-12 NOTE — PROGRESS NOTES
[facility] [place]  [address] [address]  Phone: [phone]  Primary Provider: Murali Venegas  Pre-op Performing Provider: MURALI VENEGAS      PREOPERATIVE EVALUATION:  Today's date: 7/12/2022    Paulette Flores is a 51 year old female who presents for a preoperative evaluation.    Surgical Information:  Surgery/Procedure: Colonoscopy/endoscopy  [place] [facility] [place]: Dr. Mcduffie  Surgery Date: 7/15/2022  Time of Surgery: TBD  Where patient plans to recover: At home with family  Fax number for surgical facility: Note does not need to be faxed, will be available electronically in Epic.    Type of Anesthesia Anticipated: to be determined    Assessment & Plan     The proposed surgical procedure is considered LOW risk.      ICD-10-CM    1. Preop general physical exam  Z01.818 CBC with Platelets & Differential     Basic metabolic panel     EKG 12-lead complete w/read - Clinics   2. Special screening for malignant neoplasms, colon  Z12.11 CBC with Platelets & Differential     Basic metabolic panel     EKG 12-lead complete w/read - Clinics   3. Esophageal dysphagia  R13.19 CBC with Platelets & Differential     Basic metabolic panel     EKG 12-lead complete w/read - Clinics   4. Type 2 diabetes mellitus without complication, without long-term current use of insulin (H)  E11.9 CBC with Platelets & Differential     Basic metabolic panel     EKG 12-lead complete w/read - Clinics   5. Essential hypertension with goal blood pressure less than 140/90  I10 CBC with Platelets & Differential     Basic metabolic panel     EKG 12-lead complete w/read - Clinics   6. Morbid obesity (H)  E66.01 CBC with Platelets & Differential     Basic metabolic panel     EKG 12-lead complete w/read - Clinics   7. FH: CAD (coronary artery disease)  Z82.49 CBC with Platelets & Differential     Basic metabolic panel     EKG 12-lead complete w/read - Clinics                  Medication Instructions:  Patient is to  take all scheduled medications on the day of surgery EXCEPT for modifications listed below:  Diovan/Hctz the night before \    RECOMMENDATION:  APPROVAL GIVEN to proceed with proposed procedure, without further diagnostic evaluation.                      Subjective     HPI related to upcoming procedure:   52 yO female  presents for cardiopulmonary/general clearance to undergo the above procedure.  His surgeon listed above has asked for this clearance to undergo anesthesia. Pt has had this condition for approximately several months of dysphagia and needs screening colonoscopy .   Overall pt is of good health and has not  had any perioperative complications with previous surgeries.        Preop Questions 7/12/2022   1. Have you ever had a heart attack or stroke? No   2. Have you ever had surgery on your heart or blood vessels, such as a stent placement, a coronary artery bypass, or surgery on an artery in your head, neck, heart, or legs? No   3. Do you have chest pain with activity? No   4. Do you have a history of  heart failure? No   5. Do you currently have a cold, bronchitis or symptoms of other infection? No   6. Do you have a cough, shortness of breath, or wheezing? No   7. Do you or anyone in your family have previous history of blood clots? No   8. Do you or does anyone in your family have a serious bleeding problem such as prolonged bleeding following surgeries or cuts? No   9. Have you ever had problems with anemia or been told to take iron pills? No   10. Have you had any abnormal blood loss such as black, tarry or bloody stools, or abnormal vaginal bleeding? No   11. Have you ever had a blood transfusion? No   12. Are you willing to have a blood transfusion if it is medically needed before, during, or after your surgery? Yes   13. Have you or any of your relatives ever had problems with anesthesia? No   14. Do you have sleep apnea, excessive snoring or daytime drowsiness? YES -   14a. Do you have a CPAP  machine? Yes   15. Do you have any artifical heart valves or other implanted medical devices like a pacemaker, defibrillator, or continuous glucose monitor? No   16. Do you have artificial joints? No   17. Are you allergic to latex? No   18. Is there any chance that you may be pregnant? No       Health Care Directive:  Patient has a Health Care Directive on file      Preoperative Review of :   reviewed - no record of controlled substances prescribed.      Status of Chronic Conditions:  DIABETES - Patient has a longstanding history of DiabetesType Type II . Patient is being treated with diet and exercise and denies significant side effects. Control has been good. Complicating factors include but are not limited to: hypertension and morbid obesity .     HYPERTENSION - Patient has longstanding history of HTN , currently denies any symptoms referable to elevated blood pressure. Specifically denies chest pain, palpitations, dyspnea, orthopnea, PND or peripheral edema. Blood pressure readings have been in normal range. Current medication regimen is as listed below. Patient denies any side effects of medication.       Review of Systems  Constitutional, neuro, ENT, endocrine, pulmonary, cardiac, gastrointestinal, genitourinary, musculoskeletal, integument and psychiatric systems are negative, except as otherwise noted.    Patient Active Problem List    Diagnosis Date Noted     Diabetes mellitus, type 2 (H) 04/27/2022     Priority: Medium     FH: CAD (coronary artery disease) 07/01/2019     Priority: Medium     Mixed hyperlipidemia 07/01/2019     Priority: Medium     GIN (obstructive sleep apnea) 12/11/2018     Priority: Medium     Morbid obesity (H) 08/06/2018     Priority: Medium     ACP (advance care planning) 07/20/2016     Priority: Medium     Advance Care Planning 7/20/2016: ACP Review of Chart / Resources Provided:  Reviewed chart for advance care plan.  Paulette Flores has been provided information and resources  to begin or update their advance care plan.  Added by Katia Champagne           Essential hypertension with goal blood pressure less than 140/90 07/20/2016     Priority: Medium     Migraine 02/23/2001     Priority: Medium     Problem list name updated by automated process. 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     Infections of genitourinary tract antepartum 05/27/2009     Metrorrhagia 12/06/2001     Migraine, unspecified, without mention of intractable migraine without mention of status migrainosus 02/23/2001    oral contraceptive related     Pain in limb 08/22/2002     Unspecified disorder of female genital organs 12/13/2006     Unspecified hemorrhage in early pregnancy, antepartum 12/04/2006     Past Surgical History:   Procedure Laterality Date     BREAST SURGERY       CHOLECYSTECTOMY       GYN SURGERY      laparoscopic supracervical hysterectomy,left tube and ovary removal,fulguration of endometriosis 8/28/13--[facility]     LAPAROSCOPIC HYSTERECTOMY SUPRACERVICAL, BILATERAL SALPINGO-OOPHORECTOMY, COMBINED  8/28/2013    Procedure: COMBINED LAPAROSCOPIC HYSTERECTOMY SUPRACERVICAL, SALPINGO-OOPHORECTOMY;  LAPAROSCOPIC SUPRACERVICAL HYSTERECTOMY/LAPAROSCOPIC LEFT SALPINGO-OOPHORECTOMY;  Surgeon: Jerrell Thornton MD;  Location: [facility] OR     LAPAROSCOPIC SALPINGO-OOPHORECTOMY Right 11/22/2017    Procedure: LAPAROSCOPIC SALPINGO-OOPHORECTOMY;  LAPAROSCOPIC RIGHT SALPINGO-OOPHORECTOMY excision of right pelvic side wall mass;  Surgeon: Jerrell Thornton MD;  Location: [facility] OR     LAPAROSCOPY DIAGNOSTIC (GENERAL)       WISDOM TEETH       Current Outpatient Medications   Medication Sig Dispense Refill     aspirin 81 MG chewable tablet Take 1 tablet (81 mg) by mouth daily  (Patient taking differently: Take 81 mg by mouth every other day) 90 tablet 3     bisacodyl (DULCOLAX) 5 MG EC tablet 2 tabs po at hs 2 night before surgery. 2 tabs at 3pm day before surgery. 4 tablet 0     blood glucose (CONTOUR NEXT TEST) test strip Use to test blood sugar 2 times daily 100 strip 11     blood glucose monitoring (NO BRAND SPECIFIED) meter device kit Use to test blood sugar 2 times daily or as directed. 1 kit 0     fluticasone (FLONASE) 50 MCG/ACT nasal spray Spray 2 sprays into both nostrils daily 16 g 11     ibuprofen (ADVIL/MOTRIN) 800 MG tablet Take 1 tablet (800 mg) by mouth every 8 hours as needed for moderate pain 60 tablet 0     Microlet Lancets MISC Use to test blood glucose twice a day 100 each 11     polyethylene glycol (GOLYTELY) 236 g suspension Drink 8 oz q 10 mins until jug is 1/2 empty 6pm night prior to surgery. Refrigerate. Repeat 6 hours prior to surgery. 4000 mL 0     simvastatin (ZOCOR) 20 MG tablet TAKE 1 TABLET BY MOUTH AT BEDTIME 90 tablet 3     valsartan-hydrochlorothiazide (DIOVAN HCT) 80-12.5 MG tablet TAKE 1 TABLET BY MOUTH EVERY DAY 90 tablet 3       Allergies   Allergen Reactions     Ace Inhibitors Cough     Penicillins      Rash as baby      Sulfa Drugs      SULFA (SULFONAMIDE ANTIBIOTICS)     Adhesive Tape Itching and Rash     Band aids.        Social History     Tobacco Use     Smoking status: Never Smoker     Smokeless tobacco: Never Used   Substance Use Topics     Alcohol use: No     Alcohol/week: 0.0 standard drinks     Family History   Problem Relation Age of Onset     Myocardial Infarction Paternal Grandfather 45        MI, cause of death     Cancer Maternal Aunt         Cervical     Hypertension Maternal Grandmother      Osteoporosis Maternal Grandmother      Myocardial Infarction Father         in 60's / smoker      Myocardial Infarction Mother         MI vs CVA - age 66yO     History   Drug Use Unknown         Objective     /75 (BP Location: Right arm,  Patient Position: Sitting, Cuff Size: Adult Large)   Pulse 85   Temp 97.9  F (36.6  C) (Tympanic)   Resp 18   Wt 123.8 kg (273 lb)   LMP 08/18/2013   SpO2 97%   BMI 40.32 kg/m      Physical Exam    GENERAL APPEARANCE: healthy, alert and no distress     EYES: EOMI, PERRL     HENT: ear canals and TM's normal and nose and mouth without ulcers or lesions     NECK: no adenopathy, no asymmetry, masses, or scars and thyroid normal to palpation     RESP: lungs clear to auscultation - no rales, rhonchi or wheezes     CV: regular rates and rhythm, normal S1 S2, no S3 or S4 and no murmur, click or rub     ABDOMEN:  soft, nontender, no HSM or masses and bowel sounds normal     MS: extremities normal- no gross deformities noted, no evidence of inflammation in joints, FROM in all extremities.     SKIN: no suspicious lesions or rashes     NEURO: Normal strength and tone, sensory exam grossly normal, mentation intact and speech normal     PSYCH: mentation appears normal. and affect normal/bright     LYMPHATICS: No cervical adenopathy    Recent Labs   Lab Test 07/12/22  1318 05/26/22  0946 04/27/22  0745 04/27/22  0744   HGB 13.4  --  14.2  --      --  219  --    NA  --  139  --  138   POTASSIUM  --  4.0  --  3.9   CR  --  0.62  --  0.70   A1C  --   --  6.6*  --         Diagnostics:  Recent Results (from the past 24 hour(s))   Basic metabolic panel    Collection Time: 07/12/22  1:18 PM   Result Value Ref Range    Sodium 138 133 - 144 mmol/L    Potassium 3.8 3.4 - 5.3 mmol/L    Chloride 107 94 - 109 mmol/L    Carbon Dioxide (CO2) 26 20 - 32 mmol/L    Anion Gap 5 3 - 14 mmol/L    Urea Nitrogen 14 7 - 30 mg/dL    Creatinine 0.70 0.52 - 1.04 mg/dL    Calcium 9.3 8.5 - 10.1 mg/dL    Glucose 165 (H) 70 - 99 mg/dL    GFR Estimate >90 >60 mL/min/1.73m2   CBC with platelets and differential    Collection Time: 07/12/22  1:18 PM   Result Value Ref Range    WBC Count 6.5 4.0 - 11.0 10e3/uL    RBC Count 4.62 3.80 - 5.20 10e6/uL     Hemoglobin 13.4 11.7 - 15.7 g/dL    Hematocrit 41.1 35.0 - 47.0 %    MCV 89 78 - 100 fL    MCH 29.0 26.5 - 33.0 pg    MCHC 32.6 31.5 - 36.5 g/dL    RDW 13.9 10.0 - 15.0 %    Platelet Count 204 150 - 450 10e3/uL    % Neutrophils 61 %    % Lymphocytes 27 %    % Monocytes 6 %    % Eosinophils 5 %    % Basophils 1 %    % Immature Granulocytes 0 %    NRBCs per 100 WBC 0 <1 /100    Absolute Neutrophils 4.0 1.6 - 8.3 10e3/uL    Absolute Lymphocytes 1.7 0.8 - 5.3 10e3/uL    Absolute Monocytes 0.4 0.0 - 1.3 10e3/uL    Absolute Eosinophils 0.3 0.0 - 0.7 10e3/uL    Absolute Basophils 0.1 0.0 - 0.2 10e3/uL    Absolute Immature Granulocytes 0.0 <=0.4 10e3/uL    Absolute NRBCs 0.0 10e3/uL      EKG: appears normal, NSR, normal axis, normal intervals, no acute ST/T changes c/w ischemia, no LVH by voltage criteria, unchanged from previous tracings    Revised Cardiac Risk Index (RCRI):  The patient has the following serious cardiovascular risks for perioperative complications:   - No serious cardiac risks = 0 points     RCRI Interpretation: 0 points: Class I (very low risk - 0.4% complication rate)           Signed Electronically by: Manohar Villareal MD  Copy of this evaluation report is provided to requesting physician.

## 2022-07-12 NOTE — NURSING NOTE
"Chief Complaint   Patient presents with     Pre-Op Exam       Initial /75 (BP Location: Right arm, Patient Position: Sitting, Cuff Size: Adult Large)   Pulse 85   Temp 97.9  F (36.6  C) (Tympanic)   Resp 18   Wt 123.8 kg (273 lb)   LMP 08/18/2013   SpO2 97%   BMI 40.32 kg/m   Estimated body mass index is 40.32 kg/m  as calculated from the following:    Height as of 6/15/22: 1.753 m (5' 9\").    Weight as of this encounter: 123.8 kg (273 lb).  Medication Reconciliation: complete  Keren Osullivan LPN  "

## 2022-07-12 NOTE — H&P (VIEW-ONLY)
[facility] [place]  [address] [address]  Phone: [phone]  Primary Provider: Murali Venegas  Pre-op Performing Provider: MURALI VENEGAS      PREOPERATIVE EVALUATION:  Today's date: 7/12/2022    Paulette Flores is a 51 year old female who presents for a preoperative evaluation.    Surgical Information:  Surgery/Procedure: Colonoscopy/endoscopy  Surgery Location: [facility] [place]  Surgeon: Dr. Mcduffie  Surgery Date: 7/15/2022  Time of Surgery: TBD  Where patient plans to recover: At home with family  Fax number for surgical facility: Note does not need to be faxed, will be available electronically in Epic.    Type of Anesthesia Anticipated: to be determined    Assessment & Plan     The proposed surgical procedure is considered LOW risk.      ICD-10-CM    1. Preop general physical exam  Z01.818 CBC with Platelets & Differential     Basic metabolic panel     EKG 12-lead complete w/read - Clinics   2. Special screening for malignant neoplasms, colon  Z12.11 CBC with Platelets & Differential     Basic metabolic panel     EKG 12-lead complete w/read - Clinics   3. Esophageal dysphagia  R13.19 CBC with Platelets & Differential     Basic metabolic panel     EKG 12-lead complete w/read - Clinics   4. Type 2 diabetes mellitus without complication, without long-term current use of insulin (H)  E11.9 CBC with Platelets & Differential     Basic metabolic panel     EKG 12-lead complete w/read - Clinics   5. Essential hypertension with goal blood pressure less than 140/90  I10 CBC with Platelets & Differential     Basic metabolic panel     EKG 12-lead complete w/read - Clinics   6. Morbid obesity (H)  E66.01 CBC with Platelets & Differential     Basic metabolic panel     EKG 12-lead complete w/read - Clinics   7. FH: CAD (coronary artery disease)  Z82.49 CBC with Platelets & Differential     Basic metabolic panel     EKG 12-lead complete w/read - Clinics                  Medication Instructions:  Patient is to  take all scheduled medications on the day of surgery EXCEPT for modifications listed below:  Diovan/Hctz the night before \    RECOMMENDATION:  APPROVAL GIVEN to proceed with proposed procedure, without further diagnostic evaluation.                      Subjective     HPI related to upcoming procedure:   50 yO female  presents for cardiopulmonary/general clearance to undergo the above procedure.  His surgeon listed above has asked for this clearance to undergo anesthesia. Pt has had this condition for approximately several months of dysphagia and needs screening colonoscopy .   Overall pt is of good health and has not  had any perioperative complications with previous surgeries.        Preop Questions 7/12/2022   1. Have you ever had a heart attack or stroke? No   2. Have you ever had surgery on your heart or blood vessels, such as a stent placement, a coronary artery bypass, or surgery on an artery in your head, neck, heart, or legs? No   3. Do you have chest pain with activity? No   4. Do you have a history of  heart failure? No   5. Do you currently have a cold, bronchitis or symptoms of other infection? No   6. Do you have a cough, shortness of breath, or wheezing? No   7. Do you or anyone in your family have previous history of blood clots? No   8. Do you or does anyone in your family have a serious bleeding problem such as prolonged bleeding following surgeries or cuts? No   9. Have you ever had problems with anemia or been told to take iron pills? No   10. Have you had any abnormal blood loss such as black, tarry or bloody stools, or abnormal vaginal bleeding? No   11. Have you ever had a blood transfusion? No   12. Are you willing to have a blood transfusion if it is medically needed before, during, or after your surgery? Yes   13. Have you or any of your relatives ever had problems with anesthesia? No   14. Do you have sleep apnea, excessive snoring or daytime drowsiness? YES -   14a. Do you have a CPAP  machine? Yes   15. Do you have any artifical heart valves or other implanted medical devices like a pacemaker, defibrillator, or continuous glucose monitor? No   16. Do you have artificial joints? No   17. Are you allergic to latex? No   18. Is there any chance that you may be pregnant? No       Health Care Directive:  Patient has a Health Care Directive on file      Preoperative Review of :   reviewed - no record of controlled substances prescribed.      Status of Chronic Conditions:  DIABETES - Patient has a longstanding history of DiabetesType Type II . Patient is being treated with diet and exercise and denies significant side effects. Control has been good. Complicating factors include but are not limited to: hypertension and morbid obesity .     HYPERTENSION - Patient has longstanding history of HTN , currently denies any symptoms referable to elevated blood pressure. Specifically denies chest pain, palpitations, dyspnea, orthopnea, PND or peripheral edema. Blood pressure readings have been in normal range. Current medication regimen is as listed below. Patient denies any side effects of medication.       Review of Systems  Constitutional, neuro, ENT, endocrine, pulmonary, cardiac, gastrointestinal, genitourinary, musculoskeletal, integument and psychiatric systems are negative, except as otherwise noted.    Patient Active Problem List    Diagnosis Date Noted     Diabetes mellitus, type 2 (H) 04/27/2022     Priority: Medium     FH: CAD (coronary artery disease) 07/01/2019     Priority: Medium     Mixed hyperlipidemia 07/01/2019     Priority: Medium     GIN (obstructive sleep apnea) 12/11/2018     Priority: Medium     Morbid obesity (H) 08/06/2018     Priority: Medium     ACP (advance care planning) 07/20/2016     Priority: Medium     Advance Care Planning 7/20/2016: ACP Review of Chart / Resources Provided:  Reviewed chart for advance care plan.  Paulette Flores has been provided information and resources  to begin or update their advance care plan.  Added by Katia Champagne           Essential hypertension with goal blood pressure less than 140/90 07/20/2016     Priority: Medium     Migraine 02/23/2001     Priority: Medium     Problem list name updated by automated process. 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     Infections of genitourinary tract antepartum 05/27/2009     Metrorrhagia 12/06/2001     Migraine, unspecified, without mention of intractable migraine without mention of status migrainosus 02/23/2001    oral contraceptive related     Pain in limb 08/22/2002     Unspecified disorder of female genital organs 12/13/2006     Unspecified hemorrhage in early pregnancy, antepartum 12/04/2006     Past Surgical History:   Procedure Laterality Date     BREAST SURGERY       CHOLECYSTECTOMY       GYN SURGERY      laparoscopic supracervical hysterectomy,left tube and ovary removal,fulguration of endometriosis 8/28/13--[facility]     LAPAROSCOPIC HYSTERECTOMY SUPRACERVICAL, BILATERAL SALPINGO-OOPHORECTOMY, COMBINED  8/28/2013    Procedure: COMBINED LAPAROSCOPIC HYSTERECTOMY SUPRACERVICAL, SALPINGO-OOPHORECTOMY;  LAPAROSCOPIC SUPRACERVICAL HYSTERECTOMY/LAPAROSCOPIC LEFT SALPINGO-OOPHORECTOMY;  Surgeon: Jerrell Thornton MD;  Location: [facility] OR     LAPAROSCOPIC SALPINGO-OOPHORECTOMY Right 11/22/2017    Procedure: LAPAROSCOPIC SALPINGO-OOPHORECTOMY;  LAPAROSCOPIC RIGHT SALPINGO-OOPHORECTOMY excision of right pelvic side wall mass;  Surgeon: Jerrell Thornton MD;  Location: [facility] OR     LAPAROSCOPY DIAGNOSTIC (GENERAL)       WISDOM TEETH       Current Outpatient Medications   Medication Sig Dispense Refill     aspirin 81 MG chewable tablet Take 1 tablet (81 mg) by mouth daily  (Patient taking differently: Take 81 mg by mouth every other day) 90 tablet 3     bisacodyl (DULCOLAX) 5 MG EC tablet 2 tabs po at hs 2 night before surgery. 2 tabs at 3pm day before surgery. 4 tablet 0     blood glucose (CONTOUR NEXT TEST) test strip Use to test blood sugar 2 times daily 100 strip 11     blood glucose monitoring (NO BRAND SPECIFIED) meter device kit Use to test blood sugar 2 times daily or as directed. 1 kit 0     fluticasone (FLONASE) 50 MCG/ACT nasal spray Spray 2 sprays into both nostrils daily 16 g 11     ibuprofen (ADVIL/MOTRIN) 800 MG tablet Take 1 tablet (800 mg) by mouth every 8 hours as needed for moderate pain 60 tablet 0     Microlet Lancets MISC Use to test blood glucose twice a day 100 each 11     polyethylene glycol (GOLYTELY) 236 g suspension Drink 8 oz q 10 mins until jug is 1/2 empty 6pm night prior to surgery. Refrigerate. Repeat 6 hours prior to surgery. 4000 mL 0     simvastatin (ZOCOR) 20 MG tablet TAKE 1 TABLET BY MOUTH AT BEDTIME 90 tablet 3     valsartan-hydrochlorothiazide (DIOVAN HCT) 80-12.5 MG tablet TAKE 1 TABLET BY MOUTH EVERY DAY 90 tablet 3       Allergies   Allergen Reactions     Ace Inhibitors Cough     Penicillins      Rash as baby      Sulfa Drugs      SULFA (SULFONAMIDE ANTIBIOTICS)     Adhesive Tape Itching and Rash     Band aids.        Social History     Tobacco Use     Smoking status: Never Smoker     Smokeless tobacco: Never Used   Substance Use Topics     Alcohol use: No     Alcohol/week: 0.0 standard drinks     Family History   Problem Relation Age of Onset     Myocardial Infarction Paternal Grandfather 45        MI, cause of death     Cancer Maternal Aunt         Cervical     Hypertension Maternal Grandmother      Osteoporosis Maternal Grandmother      Myocardial Infarction Father         in 60's / smoker      Myocardial Infarction Mother         MI vs CVA - age 66yO     History   Drug Use Unknown         Objective     /75 (BP Location: Right arm,  Patient Position: Sitting, Cuff Size: Adult Large)   Pulse 85   Temp 97.9  F (36.6  C) (Tympanic)   Resp 18   Wt 123.8 kg (273 lb)   LMP 08/18/2013   SpO2 97%   BMI 40.32 kg/m      Physical Exam    GENERAL APPEARANCE: healthy, alert and no distress     EYES: EOMI, PERRL     HENT: ear canals and TM's normal and nose and mouth without ulcers or lesions     NECK: no adenopathy, no asymmetry, masses, or scars and thyroid normal to palpation     RESP: lungs clear to auscultation - no rales, rhonchi or wheezes     CV: regular rates and rhythm, normal S1 S2, no S3 or S4 and no murmur, click or rub     ABDOMEN:  soft, nontender, no HSM or masses and bowel sounds normal     MS: extremities normal- no gross deformities noted, no evidence of inflammation in joints, FROM in all extremities.     SKIN: no suspicious lesions or rashes     NEURO: Normal strength and tone, sensory exam grossly normal, mentation intact and speech normal     PSYCH: mentation appears normal. and affect normal/bright     LYMPHATICS: No cervical adenopathy    Recent Labs   Lab Test 07/12/22  1318 05/26/22  0946 04/27/22  0745 04/27/22  0744   HGB 13.4  --  14.2  --      --  219  --    NA  --  139  --  138   POTASSIUM  --  4.0  --  3.9   CR  --  0.62  --  0.70   A1C  --   --  6.6*  --         Diagnostics:  Recent Results (from the past 24 hour(s))   Basic metabolic panel    Collection Time: 07/12/22  1:18 PM   Result Value Ref Range    Sodium 138 133 - 144 mmol/L    Potassium 3.8 3.4 - 5.3 mmol/L    Chloride 107 94 - 109 mmol/L    Carbon Dioxide (CO2) 26 20 - 32 mmol/L    Anion Gap 5 3 - 14 mmol/L    Urea Nitrogen 14 7 - 30 mg/dL    Creatinine 0.70 0.52 - 1.04 mg/dL    Calcium 9.3 8.5 - 10.1 mg/dL    Glucose 165 (H) 70 - 99 mg/dL    GFR Estimate >90 >60 mL/min/1.73m2   CBC with platelets and differential    Collection Time: 07/12/22  1:18 PM   Result Value Ref Range    WBC Count 6.5 4.0 - 11.0 10e3/uL    RBC Count 4.62 3.80 - 5.20 10e6/uL     Hemoglobin 13.4 11.7 - 15.7 g/dL    Hematocrit 41.1 35.0 - 47.0 %    MCV 89 78 - 100 fL    MCH 29.0 26.5 - 33.0 pg    MCHC 32.6 31.5 - 36.5 g/dL    RDW 13.9 10.0 - 15.0 %    Platelet Count 204 150 - 450 10e3/uL    % Neutrophils 61 %    % Lymphocytes 27 %    % Monocytes 6 %    % Eosinophils 5 %    % Basophils 1 %    % Immature Granulocytes 0 %    NRBCs per 100 WBC 0 <1 /100    Absolute Neutrophils 4.0 1.6 - 8.3 10e3/uL    Absolute Lymphocytes 1.7 0.8 - 5.3 10e3/uL    Absolute Monocytes 0.4 0.0 - 1.3 10e3/uL    Absolute Eosinophils 0.3 0.0 - 0.7 10e3/uL    Absolute Basophils 0.1 0.0 - 0.2 10e3/uL    Absolute Immature Granulocytes 0.0 <=0.4 10e3/uL    Absolute NRBCs 0.0 10e3/uL      EKG: appears normal, NSR, normal axis, normal intervals, no acute ST/T changes c/w ischemia, no LVH by voltage criteria, unchanged from previous tracings    Revised Cardiac Risk Index (RCRI):  The patient has the following serious cardiovascular risks for perioperative complications:   - No serious cardiac risks = 0 points     RCRI Interpretation: 0 points: Class I (very low risk - 0.4% complication rate)           Signed Electronically by: Manohar Villareal MD  Copy of this evaluation report is provided to requesting physician.

## 2022-07-13 LAB — SARS-COV-2 RNA RESP QL NAA+PROBE: NEGATIVE

## 2022-07-15 ENCOUNTER — ANESTHESIA (OUTPATIENT)
Dept: SURGERY | Facility: HOSPITAL | Age: 52
End: 2022-07-15
Payer: COMMERCIAL

## 2022-07-15 ENCOUNTER — HOSPITAL ENCOUNTER (OUTPATIENT)
Facility: HOSPITAL | Age: 52
Discharge: HOME | End: 2022-07-15
Attending: SURGERY | Admitting: SURGERY
Payer: COMMERCIAL

## 2022-07-15 VITALS
DIASTOLIC BLOOD PRESSURE: 89 MMHG | BODY MASS INDEX: 40.29 KG/M2 | OXYGEN SATURATION: 97 % | SYSTOLIC BLOOD PRESSURE: 121 MMHG | HEART RATE: 61 BPM | WEIGHT: 272 LBS | TEMPERATURE: 97.7 F | RESPIRATION RATE: 16 BRPM | HEIGHT: 69 IN

## 2022-07-15 PROCEDURE — 710N000012 HC RECOVERY PHASE 2, PER MINUTE: Performed by: SURGERY

## 2022-07-15 PROCEDURE — 250N000011 HC RX IP 250 OP 636: Performed by: NURSE ANESTHETIST, CERTIFIED REGISTERED

## 2022-07-15 PROCEDURE — 43239 EGD BIOPSY SINGLE/MULTIPLE: CPT | Performed by: NURSE ANESTHETIST, CERTIFIED REGISTERED

## 2022-07-15 PROCEDURE — 43239 EGD BIOPSY SINGLE/MULTIPLE: CPT | Performed by: SURGERY

## 2022-07-15 PROCEDURE — 258N000003 HC RX IP 258 OP 636: Performed by: NURSE ANESTHETIST, CERTIFIED REGISTERED

## 2022-07-15 PROCEDURE — 360N000075 HC SURGERY LEVEL 2, PER MIN: Performed by: SURGERY

## 2022-07-15 PROCEDURE — 370N000017 HC ANESTHESIA TECHNICAL FEE, PER MIN: Performed by: SURGERY

## 2022-07-15 PROCEDURE — 272N000001 HC OR GENERAL SUPPLY STERILE: Performed by: SURGERY

## 2022-07-15 PROCEDURE — 45380 COLONOSCOPY AND BIOPSY: CPT | Mod: PT | Performed by: SURGERY

## 2022-07-15 PROCEDURE — 250N000009 HC RX 250: Performed by: NURSE ANESTHETIST, CERTIFIED REGISTERED

## 2022-07-15 PROCEDURE — 88305 TISSUE EXAM BY PATHOLOGIST: CPT | Mod: TC | Performed by: SURGERY

## 2022-07-15 PROCEDURE — 88305 TISSUE EXAM BY PATHOLOGIST: CPT | Mod: 26 | Performed by: PATHOLOGY

## 2022-07-15 PROCEDURE — 999N000141 HC STATISTIC PRE-PROCEDURE NURSING ASSESSMENT: Performed by: SURGERY

## 2022-07-15 RX ORDER — ONDANSETRON 4 MG/1
4 TABLET, ORALLY DISINTEGRATING ORAL EVERY 30 MIN PRN
Status: DISCONTINUED | OUTPATIENT
Start: 2022-07-15 | End: 2022-07-15 | Stop reason: HOSPADM

## 2022-07-15 RX ORDER — METOPROLOL TARTRATE 1 MG/ML
1-2 INJECTION, SOLUTION INTRAVENOUS EVERY 5 MIN PRN
Status: DISCONTINUED | OUTPATIENT
Start: 2022-07-15 | End: 2022-07-15 | Stop reason: HOSPADM

## 2022-07-15 RX ORDER — FLUMAZENIL 0.1 MG/ML
0.2 INJECTION, SOLUTION INTRAVENOUS
Status: DISCONTINUED | OUTPATIENT
Start: 2022-07-15 | End: 2022-07-15 | Stop reason: HOSPADM

## 2022-07-15 RX ORDER — PROPOFOL 10 MG/ML
INJECTION, EMULSION INTRAVENOUS PRN
Status: DISCONTINUED | OUTPATIENT
Start: 2022-07-15 | End: 2022-07-15

## 2022-07-15 RX ORDER — FENTANYL CITRATE 50 UG/ML
50 INJECTION, SOLUTION INTRAMUSCULAR; INTRAVENOUS
Status: DISCONTINUED | OUTPATIENT
Start: 2022-07-15 | End: 2022-07-15 | Stop reason: HOSPADM

## 2022-07-15 RX ORDER — LIDOCAINE HYDROCHLORIDE 20 MG/ML
INJECTION, SOLUTION INFILTRATION; PERINEURAL PRN
Status: DISCONTINUED | OUTPATIENT
Start: 2022-07-15 | End: 2022-07-15

## 2022-07-15 RX ORDER — OXYCODONE HYDROCHLORIDE 5 MG/1
5 TABLET ORAL EVERY 4 HOURS PRN
Status: DISCONTINUED | OUTPATIENT
Start: 2022-07-15 | End: 2022-07-15 | Stop reason: HOSPADM

## 2022-07-15 RX ORDER — HYDROMORPHONE HYDROCHLORIDE 1 MG/ML
0.2 INJECTION, SOLUTION INTRAMUSCULAR; INTRAVENOUS; SUBCUTANEOUS EVERY 5 MIN PRN
Status: DISCONTINUED | OUTPATIENT
Start: 2022-07-15 | End: 2022-07-15 | Stop reason: HOSPADM

## 2022-07-15 RX ORDER — NALOXONE HYDROCHLORIDE 0.4 MG/ML
0.4 INJECTION, SOLUTION INTRAMUSCULAR; INTRAVENOUS; SUBCUTANEOUS
Status: DISCONTINUED | OUTPATIENT
Start: 2022-07-15 | End: 2022-07-15 | Stop reason: HOSPADM

## 2022-07-15 RX ORDER — MEPERIDINE HYDROCHLORIDE 25 MG/ML
12.5 INJECTION INTRAMUSCULAR; INTRAVENOUS; SUBCUTANEOUS
Status: DISCONTINUED | OUTPATIENT
Start: 2022-07-15 | End: 2022-07-15 | Stop reason: HOSPADM

## 2022-07-15 RX ORDER — LIDOCAINE 40 MG/G
CREAM TOPICAL
Status: DISCONTINUED | OUTPATIENT
Start: 2022-07-15 | End: 2022-07-15 | Stop reason: HOSPADM

## 2022-07-15 RX ORDER — HYDRALAZINE HYDROCHLORIDE 20 MG/ML
2.5-5 INJECTION INTRAMUSCULAR; INTRAVENOUS EVERY 10 MIN PRN
Status: DISCONTINUED | OUTPATIENT
Start: 2022-07-15 | End: 2022-07-15 | Stop reason: HOSPADM

## 2022-07-15 RX ORDER — SODIUM CHLORIDE, SODIUM LACTATE, POTASSIUM CHLORIDE, CALCIUM CHLORIDE 600; 310; 30; 20 MG/100ML; MG/100ML; MG/100ML; MG/100ML
INJECTION, SOLUTION INTRAVENOUS CONTINUOUS
Status: DISCONTINUED | OUTPATIENT
Start: 2022-07-15 | End: 2022-07-15 | Stop reason: HOSPADM

## 2022-07-15 RX ORDER — NALOXONE HYDROCHLORIDE 0.4 MG/ML
0.2 INJECTION, SOLUTION INTRAMUSCULAR; INTRAVENOUS; SUBCUTANEOUS
Status: DISCONTINUED | OUTPATIENT
Start: 2022-07-15 | End: 2022-07-15 | Stop reason: HOSPADM

## 2022-07-15 RX ORDER — ONDANSETRON 2 MG/ML
4 INJECTION INTRAMUSCULAR; INTRAVENOUS EVERY 30 MIN PRN
Status: DISCONTINUED | OUTPATIENT
Start: 2022-07-15 | End: 2022-07-15 | Stop reason: HOSPADM

## 2022-07-15 RX ORDER — FENTANYL CITRATE 50 UG/ML
50 INJECTION, SOLUTION INTRAMUSCULAR; INTRAVENOUS EVERY 5 MIN PRN
Status: DISCONTINUED | OUTPATIENT
Start: 2022-07-15 | End: 2022-07-15 | Stop reason: HOSPADM

## 2022-07-15 RX ADMIN — PROPOFOL 50 MG: 10 INJECTION, EMULSION INTRAVENOUS at 10:08

## 2022-07-15 RX ADMIN — PROPOFOL 50 MG: 10 INJECTION, EMULSION INTRAVENOUS at 10:04

## 2022-07-15 RX ADMIN — PROPOFOL 50 MG: 10 INJECTION, EMULSION INTRAVENOUS at 09:47

## 2022-07-15 RX ADMIN — PROPOFOL 50 MG: 10 INJECTION, EMULSION INTRAVENOUS at 09:43

## 2022-07-15 RX ADMIN — PROPOFOL 50 MG: 10 INJECTION, EMULSION INTRAVENOUS at 09:50

## 2022-07-15 RX ADMIN — PROPOFOL 50 MG: 10 INJECTION, EMULSION INTRAVENOUS at 09:57

## 2022-07-15 RX ADMIN — PROPOFOL 50 MG: 10 INJECTION, EMULSION INTRAVENOUS at 10:11

## 2022-07-15 RX ADMIN — SODIUM CHLORIDE, POTASSIUM CHLORIDE, SODIUM LACTATE AND CALCIUM CHLORIDE: 600; 310; 30; 20 INJECTION, SOLUTION INTRAVENOUS at 09:02

## 2022-07-15 RX ADMIN — PROPOFOL 50 MG: 10 INJECTION, EMULSION INTRAVENOUS at 09:53

## 2022-07-15 RX ADMIN — PROPOFOL 50 MG: 10 INJECTION, EMULSION INTRAVENOUS at 09:55

## 2022-07-15 RX ADMIN — LIDOCAINE HYDROCHLORIDE 80 MG: 20 INJECTION, SOLUTION INFILTRATION; PERINEURAL at 09:43

## 2022-07-15 RX ADMIN — PROPOFOL 50 MG: 10 INJECTION, EMULSION INTRAVENOUS at 10:14

## 2022-07-15 RX ADMIN — PROPOFOL 50 MG: 10 INJECTION, EMULSION INTRAVENOUS at 09:44

## 2022-07-15 RX ADMIN — PROPOFOL 50 MG: 10 INJECTION, EMULSION INTRAVENOUS at 10:00

## 2022-07-15 RX ADMIN — PROPOFOL 50 MG: 10 INJECTION, EMULSION INTRAVENOUS at 09:45

## 2022-07-15 NOTE — ANESTHESIA POSTPROCEDURE EVALUATION
Patient: Paulette Flores    Procedure: Procedure(s):  Upper Endoscopy and Colonoscopy with biopsy, with polypectomy       Anesthesia Type:  MAC    Note:  Disposition: Outpatient   Postop Pain Control: Uneventful            Sign Out: Well controlled pain   PONV: No   Neuro/Psych: Uneventful            Sign Out: Acceptable/Baseline neuro status   Airway/Respiratory: Uneventful            Sign Out: Acceptable/Baseline resp. status   CV/Hemodynamics: Uneventful            Sign Out: Acceptable CV status; No obvious hypovolemia; No obvious fluid overload   Other NRE: NONE   DID A NON-ROUTINE EVENT OCCUR? No           Last vitals:  Vitals Value Taken Time   /91 07/15/22 1047   Temp 97.7  F (36.5  C) 07/15/22 1024   Pulse 65 07/15/22 1047   Resp 16 07/15/22 1045   SpO2 100 % 07/15/22 1048   Vitals shown include unvalidated device data.    Electronically Signed By: JAE Paul CRNA  July 15, 2022  11:13 AM

## 2022-07-15 NOTE — OP NOTE
Paulette Flores MRN# [id number]   YOB: 1970 Age: 51 year old      Date of Admission:  7/15/2022  Date of Service:   7/15/22    Primary care provider: Manohar Villareal    PREOPERATIVE DIAGNOSIS:  Dysphagia and colon cancer Screening        POSTOPERATIVE DIAGNOSIS:  Colon polyp x 2, duodenitis          PROCEDURE:  Colonoscopy with polypectomy, upper endoscopy with biopsy          INDICATIONS:  Dysphagia, colon cancer screening     Specimen:   ID Type Source Tests Collected by Time Destination   1 :  Biopsy Stomach, Antrum SURGICAL PATHOLOGY EXAM Jorge Mcduffie MD 7/15/2022  9:50 AM    2 :  Biopsy Gastric Esophageal Junction SURGICAL PATHOLOGY EXAM Jorge Mcduffie MD 7/15/2022  9:52 AM    3 : random esophageal biopsies Biopsy Other SURGICAL PATHOLOGY EXAM Jorge Mcduffie MD 7/15/2022  9:53 AM    4 : colon polyp at 20 cm x2 Polyp Large Intestine, Colon SURGICAL PATHOLOGY EXAM Jorge Mcduffie MD 7/15/2022 10:14 AM        SURGEON: Jorge Mcduffie MD      PROCEDURE:  With the patient in the supine position on the transport cart, IV sedation was administered by the nurse anesthetist.  The patient's correct identity and planned procedure were confirmed during a requisite timeout pause.  A bite block was placed and the fiberoptic endoscope was introduced and negotiated through the cricopharyngeus without difficulty.  The length of the esophagus was examined without findings.  The gastroesophageal junction was noted 40 cm from the incisors; the Z line was regular without ulceration, bleeding source or stricture. There was some evidence of reflux and this was biopsied with forceps.   There was  no  evidence of Krause's change.  The stomach was entered and the pylorus was traversed easily.  The gastric mucosa was normal; there was no evidence of gastric polyp, ulcer or bleeding source.  The duodenum was noted to have some mild inflammation.  The endoscope was returned to the body of the stomach and retroflex  confirmed the absence of abnormality in the cardia.      Random cold forceps biopsies were obtained of the antrum for H. pylori.  Hemostasis was judged adequate on visualization.   The endoscope was returned to the GE junction. Circumferential biopsies were obtained; hemostasis was satisfactory.  A second circumferential examination of the esophagus was performed on slow withdrawal of the endoscope without additional finding. Random biopsies were taken on the way out to look for eosinophilic esophagitis.      Our attention then turned to the colonoscopy, the external anus was inspected and was noted to have some skin tags. Digital rectal exam was normal. The colonoscope was inserted and advanced under direct visualization to the level of the cecum which was identified by the appendiceal orifice and the ileocecal valve. The prep was excellent.. Upon slow withdrawal of the colonoscope, approximately 95% of the mucosa was directly visualized. There was noted to be two small polyps at 20 cm removed with biopsy forceps.  The rest of the colon was without mucosal abnormality. There was no evidence of further polyps, inflammation, bleeding or AVMs. Retroflexion of the rectum was normal. The extra air was removed from the colon, and the colonoscope withdrawn. The patient tolerated the procedure well and was taken to postanesthesia care unit.     We invite the patient to return in 5-10 years for follow up screening evaluation, pending pathology related to polyps.    Jorge Mcduffie MD

## 2022-07-15 NOTE — ANESTHESIA CARE TRANSFER NOTE
Patient: Paulette Flores    Procedure: Procedure(s):  Upper Endoscopy and Colonoscopy with biopsy, with polypectomy       Diagnosis: Special screening for malignant neoplasms, colon [Z12.11]  Esophageal dysphagia [R13.19]  Diagnosis Additional Information: No value filed.    Anesthesia Type:   MAC     Note:    Oropharynx: oropharynx clear of all foreign objects  Level of Consciousness: drowsy  Oxygen Supplementation: room air    Independent Airway: airway patency satisfactory and stable  Dentition: dentition unchanged  Vital Signs Stable: post-procedure vital signs reviewed and stable  Report to RN Given: handoff report given  Patient transferred to: Phase II    Handoff Report: Identifed the Patient, Identified the Reponsible Provider, Reviewed the pertinent medical history, Discussed the surgical course, Reviewed Intra-OP anesthesia mangement and issues during anesthesia, Set expectations for post-procedure period and Allowed opportunity for questions and acknowledgement of understanding      Vitals:  Vitals Value Taken Time   BP     Temp     Pulse     Resp     SpO2         Electronically Signed By: JAE Sung CRNA  July 15, 2022  10:21 AM

## 2022-07-15 NOTE — OR NURSING
Patient and responsible adult given discharge instructions with no questions regarding instructions. Asha score 20. Pain level 0/10.  Discharged from unit via ambulation. Patient discharged to home with .

## 2022-07-15 NOTE — DISCHARGE INSTRUCTIONS
You had biopsies taken in your esophagus today and two colon polyps removed.  Dr. Mcduffie's office will call you with the pathology results when they become available.         UPPER ENDOSCOPY    AFTER THE PROCEDURE  You will return to Same Day Surgery to rest for about an hour before you go home.  The doctor will talk with you and your family.  A family member/friend may visit with you.  You may burp up any air remaining in your stomach.  You may feel dizzy or light-headed from the medicine.  Your nurse will go over the discharge instructions with you and your caregiver and answer any of your questions.    You will be contacted the next day to check on how you are doing.  If biopsies were taken, you will be contacted with the results usually within 3 days.  BACK AT HOME  Rest for an hour or two after you get home.  When your throat is no longer numb and you have a gag reflex, take a few sips of cool water.  If you can swallow comfortably, you may start eating again.  You may have a mild sore throat for the rest of the day.  You will be contacted with results by the surgeons office within a week. If not contacted in one week, call the surgeons office.  WHAT TO WATCH FOR:  Problems rarely occur after the exam, but it is important to be aware of the early signs of a complication.  Call your doctor immediately if you have:  Difficulty swallowing or breathing  Unusual pain in your stomach or chest  Vomiting blood or dark material that looks like coffee grounds  Black or tarry stools  Temperature over 101.5 degrees    MORE QUESTIONS?  Please ask your doctor or nurse before the exam begins  or call your doctor at the clinic.    IF YOU MUST CANCEL YOUR PROCEDURE THE EVENING/NIGHT BEFORE, PLEASE CALL HOSPITAL ADMITTING -[phone] OR TOLL FREE [phone], [phone].    Phone Numbers:  Hospital - [phone] [phone]  [facility] - [phone]  Surgery Patient Education - [phone] or toll free  [phone]      INSTRUCTIONS AFTER COLONOSCOPY    WHEN YOU ARE BACK HOME:  Plan to rest for an hour or two after you get home.  You may have some cramping or pressure until you pass gas.  You may resume your regular medications.  Eat a small, light meal at first, and then gradually return to normal meal sizes.  You will be contacted the next day to see how you are doing.  If you had a polyp removed:  Slight bleeding may occur.  You may have a slight blood stain on the toilet paper after a bowel movement.  To lessen the chance of bleeding, avoid heavy exercise for ONE WEEK.  This includes heavy lifting, vigorous sport activities, and heavy physical labor.  You may resume your normal sexual activity.    Avoid aspirin or aspirin products if instructed by your doctor.  If there is a polyp or biopsy, you will be contacted with results within one week.     WHAT TO WATCH FOR:  Problems rarely occur after the exam; however, it is important for you to watch for early signs of possible problems.  If you have   Unusual pain in your abdomen  Nausea and vomiting that persists  Excessive bleeding  Black or bloody bowel movements  Fever or temperature above 100.6 F  Please call your doctor ([facility] [phone]) or go to the nearest hospital emergency room.        Post-Anesthesia Patient Instructions    IMMEDIATELY FOLLOWING SURGERY:  Do not drive or operate machinery for the first twenty four hours after surgery.  Do not make any important decisions for twenty four hours after surgery or while taking narcotic pain medications or sedatives.  If you develop intractable nausea and vomiting or a severe headache please notify your doctor immediately.    FOLLOW-UP:  Please make an appointment with your surgeon as instructed. You do not need to follow up with anesthesia unless specifically instructed to do so.    WOUND CARE INSTRUCTIONS (if applicable):  Keep a dry clean dressing on the anesthesia/puncture wound site if there  is drainage.  Once the wound has quit draining you may leave it open to air.  Generally you should leave the bandage intact for twenty four hours unless there is drainage.  If the epidural site drains for more than 36-48 hours please call the anesthesia department.    QUESTIONS?:  Please feel free to call your physician or the hospital  if you have any questions, and they will be happy to assist you.

## 2022-07-15 NOTE — INTERVAL H&P NOTE
"I have reviewed the surgical (or preoperative) H&P that is linked to this encounter, and examined the patient. There are no significant changes    Clinical Conditions Present on Arrival:  Clinically Significant Risk Factors Present on Admission                   # DMII: A1C = N/A within past 3 months  # Severe Obesity: Estimated body mass index is 40.17 kg/m  as calculated from the following:    Height as of this encounter: 1.753 m (5' 9\").    Weight as of this encounter: 123.4 kg (272 lb).       "

## 2022-07-19 LAB
PATH REPORT.COMMENTS IMP SPEC: NORMAL
PATH REPORT.COMMENTS IMP SPEC: NORMAL
PATH REPORT.FINAL DX SPEC: NORMAL
PATH REPORT.GROSS SPEC: NORMAL
PATH REPORT.MICROSCOPIC SPEC OTHER STN: NORMAL
PATH REPORT.RELEVANT HX SPEC: NORMAL
PHOTO IMAGE: NORMAL

## 2022-07-25 ENCOUNTER — VIRTUAL VISIT (OUTPATIENT)
Dept: FAMILY MEDICINE | Facility: OTHER | Age: 52
End: 2022-07-25
Attending: NURSE PRACTITIONER
Payer: COMMERCIAL

## 2022-07-25 ENCOUNTER — TELEPHONE (OUTPATIENT)
Dept: PHARMACY | Facility: PHYSICIAN GROUP | Age: 52
End: 2022-07-25

## 2022-07-25 ENCOUNTER — TELEPHONE (OUTPATIENT)
Dept: FAMILY MEDICINE | Facility: OTHER | Age: 52
End: 2022-07-25

## 2022-07-25 DIAGNOSIS — U07.1 INFECTION DUE TO 2019 NOVEL CORONAVIRUS: Primary | ICD-10-CM

## 2022-07-25 PROCEDURE — 99214 OFFICE O/P EST MOD 30 MIN: CPT | Mod: 95 | Performed by: NURSE PRACTITIONER

## 2022-07-25 NOTE — PATIENT INSTRUCTIONS
1. Infection due to 2019 novel coronavirus  - nirmatrelvir and ritonavir (PAXLOVID) therapy pack; Take 3 tablets by mouth 2 times daily for 5 days Take 2 Nirmatrelvir tablets and 1 Ritonavir tablet twice daily for 5 days.  Dispense: 30 each; Refill: 0      Start first dose of PAXLOVID around 9 pm tonight, 12 hours after last dose of simvastatin. Do not take your simvastatin for 10 days. Your next dose of simvastatin will be 8/5/22.     Discussed Fluticasone. Will hold this for 10 days as well to keep thing simple. Next dose 8/5/22.    Will monitor blood pressure and is aware of symptoms of hypotension.

## 2022-07-25 NOTE — PROGRESS NOTES
Paulette is a 51 year old who is being evaluated via a billable telephone visit.      What phone number would you like to be contacted at? [phone]  How would you like to obtain your AVS? MyChart    Assessment & Plan     Infection due to 2019 novel coronavirus  - nirmatrelvir and ritonavir (PAXLOVID) therapy pack; Take 3 tablets by mouth 2 times daily for 5 days Take 2 Nirmatrelvir tablets and 1 Ritonavir tablet twice daily for 5 days.    Start first dose of PAXLOVID around 9 pm tonight, 12 hours after last dose of simvastatin. Do not take your simvastatin for 10 days. Your next dose of simvastatin will be 8/5/22.   Discussed Fluticasone. She will hold this for 10 days as well to keep thing simple. Next dose 8/5/22.  Will monitor blood pressure and is aware of symptoms of hypotension.     Reviewed when to seek clinic/urgent care vs emergency treatment in ER/ED.     Prescription drug management  No LOS data to display   Time spent doing chart review, history and exam, documentation and further activities per the note       Return if symptoms worsen or fail to improve.    Myranda Park, CNP  [facility]   Paulette is a 51 year old presenting for the following health issues:  No chief complaint on file.  Underlying Medical Conditions: DM, HTN, Obesity     Patient testing positive on 7/25/22 via home test kit .    Start of Symptoms: 7/24/22    Symptoms to include: fatigue, body ache, HA    Covid 19 Vaccination Status:  Fully vacc'd plus on booster    HPI     COVID-19 Symptom Review  How many days ago did these symptoms start? 7/24/22. Tested positive at home today,  7/25/22.     Are any of the following symptoms significant for you?    New or worsening difficulty breathing? No    Worsening cough? Yes, I am coughing up green mucus    Fever or chills? Yes, I have chills.    Headache: YES    Sore throat: No    Chest pain: No    Diarrhea: No    Body aches? YES    Shortness of breath: No.      What treatments has patient tried? Ibuprofen, efraín-seltzer cough/cold  Does patient live in a nursing home, group home, or shelter? No  Does patient have a way to get food/medications during quarantined? Yes, I have a friend or family member who can help me.    COVID vaccination hx: 3 doses. Last dose 12/12/21  Last Comprehensive Metabolic Panel:  Sodium   Date Value Ref Range Status   07/12/2022 138 133 - 144 mmol/L Final   08/26/2020 136 133 - 144 mmol/L Final     Potassium   Date Value Ref Range Status   07/12/2022 3.8 3.4 - 5.3 mmol/L Final   08/26/2020 4.0 3.4 - 5.3 mmol/L Final     Chloride   Date Value Ref Range Status   07/12/2022 107 94 - 109 mmol/L Final   08/26/2020 105 94 - 109 mmol/L Final     Carbon Dioxide   Date Value Ref Range Status   08/26/2020 28 20 - 32 mmol/L Final     Carbon Dioxide (CO2)   Date Value Ref Range Status   07/12/2022 26 20 - 32 mmol/L Final     Anion Gap   Date Value Ref Range Status   07/12/2022 5 3 - 14 mmol/L Final   08/26/2020 3 3 - 14 mmol/L Final     Glucose   Date Value Ref Range Status   07/12/2022 165 (H) 70 - 99 mg/dL Final   08/26/2020 111 (H) 70 - 99 mg/dL Final     Urea Nitrogen   Date Value Ref Range Status   07/12/2022 14 7 - 30 mg/dL Final   08/26/2020 16 7 - 30 mg/dL Final     Creatinine   Date Value Ref Range Status   07/12/2022 0.70 0.52 - 1.04 mg/dL Final   08/26/2020 0.77 0.52 - 1.04 mg/dL Final     GFR Estimate   Date Value Ref Range Status   07/12/2022 >90 >60 mL/min/1.73m2 Final     Comment:     Effective December 21, 2021 eGFRcr in adults is calculated using the 2021 CKD-EPI creatinine equation which includes age and gender (Phylicia bains al., NEJM, DOI: 10.1056/BDDAni8443999)   08/26/2020 >90 >60 mL/min/[1.73_m2] Final     Comment:     Non  GFR Calc  Starting 12/18/2018, serum creatinine based estimated GFR (eGFR) will be   calculated using the Chronic Kidney Disease Epidemiology Collaboration   (CKD-EPI) equation.       Calcium   Date  "Value Ref Range Status   07/12/2022 9.3 8.5 - 10.1 mg/dL Final   08/26/2020 9.0 8.5 - 10.1 mg/dL Final     Bilirubin Total   Date Value Ref Range Status   04/27/2022 0.4 0.2 - 1.3 mg/dL Final   08/26/2020 0.4 0.2 - 1.3 mg/dL Final     Alkaline Phosphatase   Date Value Ref Range Status   04/27/2022 56 40 - 150 U/L Final   08/26/2020 60 40 - 150 U/L Final     ALT   Date Value Ref Range Status   04/27/2022 93 (H) 0 - 50 U/L Final   08/26/2020 50 0 - 50 U/L Final     AST   Date Value Ref Range Status   04/27/2022 44 0 - 45 U/L Final   08/26/2020 18 0 - 45 U/L Final             Liver Function Studies - Recent Labs   Lab Test 04/27/22  0744   PROTTOTAL 7.5   ALBUMIN 3.9   BILITOTAL 0.4   ALKPHOS 56   AST 44   ALT 93*       Paxlovid drug-drug interaction check:      1. Simvastatin and Paxlovid. Simvastatin use is contraindicated when taking Paxlovid. Paxlovid can increase the serum concentration of simvastatin. Simvastatin should be held for at least 12 hours prior to the first dose of Paxlovid, for the entire course of Paxlovid, and for 5 days after completing the course of Paxlovid (held for 10 days total) per the [place] \"Management of Paxlovid Drug-Drug Interactions\" document and the Paxlovid prescribing information.      2. Fluticasone and Paxlovid. Paxlovid can increase the serum concentration of corticosteroids from all routes of administration (including fluticasone). This can lead to an increased risk for Cushing's syndrome and adrenal suppression. However, this risk may be lower with Paxlovid being a short course of therapy and fluticasone being intranasal, but the picture is not fully clear. Patient may benefit from holding intranasal fluticasone to prevent these effects from happening (a risk-benefit discussion should occur). This effect would last for the course of Paxlovid and for 3 days after completing the course (8 days total).      3. Valsartan and Paxlovid. Paxlovid may increase the serum concentration " of valsartan. Patients should monitor their blood pressure and be aware of any signs or symptoms of hypotension. With Paxlovid being a short course, dose adjustment is not recommended initially, but holding valsartan could be considered for the remainder of Paxlovid treatment if hypotension occurs.      Denis Calvillo, PharmD  Medication Therapy Management Pharmacist  [facility]  Office phone: [phone]              Review of Systems   Constitutional, HEENT, cardiovascular, pulmonary, gi and gu systems are negative, except as otherwise noted.    Patient Active Problem List   Diagnosis     Migraine     ACP (advance care planning)     Essential hypertension with goal blood pressure less than 140/90     Morbid obesity (H)     GIN (obstructive sleep apnea)     FH: CAD (coronary artery disease)     Mixed hyperlipidemia     Diabetes mellitus, type 2 (H)     Past Surgical History:   Procedure Laterality Date     BREAST SURGERY       CHOLECYSTECTOMY       ENDOSCOPY UPPER, COLONOSCOPY, COMBINED N/A 7/15/2022    Procedure: Upper Endoscopy and Colonoscopy with biopsy, with polypectomy;  Surgeon: Jorge Mcduffie MD;  Location: [facility] OR     GYN SURGERY      laparoscopic supracervical hysterectomy,left tube and ovary removal,fulguration of endometriosis 8/28/13--[facility] [place]     LAPAROSCOPIC HYSTERECTOMY SUPRACERVICAL, BILATERAL SALPINGO-OOPHORECTOMY, COMBINED  8/28/2013    Procedure: COMBINED LAPAROSCOPIC HYSTERECTOMY SUPRACERVICAL, SALPINGO-OOPHORECTOMY;  LAPAROSCOPIC SUPRACERVICAL HYSTERECTOMY/LAPAROSCOPIC LEFT SALPINGO-OOPHORECTOMY;  Surgeon: Jerrell Thornton MD;  Location: [facility]     LAPAROSCOPIC SALPINGO-OOPHORECTOMY Right 11/22/2017    Procedure: LAPAROSCOPIC SALPINGO-OOPHORECTOMY;  LAPAROSCOPIC RIGHT SALPINGO-OOPHORECTOMY excision of right pelvic side wall mass;  Surgeon: Jerrell Thornton MD;  Location: [facility]     LAPAROSCOPY DIAGNOSTIC (GENERAL)       WISDOM TEETH         Social History     Tobacco Use      Smoking status: Never Smoker     Smokeless tobacco: Never Used   Substance Use Topics     Alcohol use: No     Alcohol/week: 0.0 standard drinks     Family History   Problem Relation Age of Onset     Myocardial Infarction Paternal Grandfather 45        MI, cause of death     Cancer Maternal Aunt         Cervical     Hypertension Maternal Grandmother      Osteoporosis Maternal Grandmother      Myocardial Infarction Father         in 60's / smoker      Myocardial Infarction Mother         MI vs CVA - age 66yO           Current Outpatient Medications   Medication Sig Dispense Refill     aspirin 81 MG chewable tablet Take 1 tablet (81 mg) by mouth daily (Patient taking differently: Take 81 mg by mouth every other day) 90 tablet 3     blood glucose (CONTOUR NEXT TEST) test strip Use to test blood sugar 2 times daily 100 strip 11     blood glucose monitoring (NO BRAND SPECIFIED) meter device kit Use to test blood sugar 2 times daily or as directed. 1 kit 0     fluticasone (FLONASE) 50 MCG/ACT nasal spray Spray 2 sprays into both nostrils daily 16 g 11     ibuprofen (ADVIL/MOTRIN) 800 MG tablet Take 1 tablet (800 mg) by mouth every 8 hours as needed for moderate pain 60 tablet 0     Microlet Lancets MISC Use to test blood glucose twice a day 100 each 11     nirmatrelvir and ritonavir (PAXLOVID) therapy pack Take 3 tablets by mouth 2 times daily for 5 days Take 2 Nirmatrelvir tablets and 1 Ritonavir tablet twice daily for 5 days. 30 each 0     simvastatin (ZOCOR) 20 MG tablet TAKE 1 TABLET BY MOUTH AT BEDTIME 90 tablet 3     valsartan-hydrochlorothiazide (DIOVAN HCT) 80-12.5 MG tablet TAKE 1 TABLET BY MOUTH EVERY DAY 90 tablet 3     Allergies   Allergen Reactions     Ace Inhibitors Cough     Penicillins      Rash as baby      Sulfa Drugs      SULFA (SULFONAMIDE ANTIBIOTICS)     Adhesive Tape Itching and Rash     Band aids.     Recent Labs   Lab Test 07/12/22  1318 05/26/22  0946 04/27/22  0745 04/27/22  0744 04/27/22  0744  08/26/20  1053 03/25/20  0745 07/01/19  1011 08/06/18  1352   A1C  --   --  6.6*  --   --   --   --   --   --    LDL  --   --   --   --  84 83 68   < >  --    HDL  --   --   --   --  38* 40* 46*   < >  --    TRIG  --   --   --   --  138 233* 229*   < >  --    ALT  --   --   --   --  93* 50 48   < >  --    CR 0.70 0.62  --    < > 0.70 0.77 0.71   < >  --    GFRESTIMATED >90 >90  --    < > >90 >90 >90   < >  --    GFRESTBLACK  --   --   --   --   --  >90 >90   < >  --    POTASSIUM 3.8 4.0  --    < > 3.9 4.0 3.8   < >  --    TSH  --   --   --   --  2.90  --   --   --  1.91    < > = values in this interval not displayed.        BP Readings from Last 3 Encounters:   07/15/22 121/89   07/12/22 129/75   06/15/22 138/84    Wt Readings from Last 3 Encounters:   07/15/22 123.4 kg (272 lb)   07/12/22 123.8 kg (273 lb)   06/15/22 122.5 kg (270 lb)                   Objective       Vitals:  No vitals were obtained today due to virtual visit.    Physical Exam   alert, no distress and cooperative  PSYCH: Alert and oriented times 3; coherent speech, normal   rate and volume, able to articulate logical thoughts, able   to abstract reason, no tangential thoughts, no hallucinations   or delusions  Her affect is normal  RESP:Dry cough, no audible wheezing, able to talk in full sentences. Nasal congestion per voice over phone.   Remainder of exam unable to be completed due to telephone visits            Phone call duration: 13 minutes    .  ..

## 2022-08-08 ENCOUNTER — MYC MEDICAL ADVICE (OUTPATIENT)
Dept: FAMILY MEDICINE | Facility: OTHER | Age: 52
End: 2022-08-08

## 2022-08-08 DIAGNOSIS — E11.9 TYPE 2 DIABETES MELLITUS WITHOUT COMPLICATION, WITHOUT LONG-TERM CURRENT USE OF INSULIN (H): Primary | ICD-10-CM

## 2022-08-08 DIAGNOSIS — Z82.49 FH: CAD (CORONARY ARTERY DISEASE): ICD-10-CM

## 2022-08-08 RX ORDER — PRAVASTATIN SODIUM 20 MG
20 TABLET ORAL DAILY
Qty: 30 TABLET | Refills: 1 | Status: SHIPPED | OUTPATIENT
Start: 2022-08-08 | End: 2022-10-03

## 2022-08-08 NOTE — TELEPHONE ENCOUNTER
I sent this to her in the previous [employer] message and did not get a response:     We can see you at 9am on tuesday 8/16/2022. Let me know if that works for you?

## 2022-08-17 ENCOUNTER — OFFICE VISIT (OUTPATIENT)
Dept: SURGERY | Facility: OTHER | Age: 52
End: 2022-08-17
Attending: SURGERY
Payer: COMMERCIAL

## 2022-08-17 VITALS
HEART RATE: 89 BPM | OXYGEN SATURATION: 97 % | WEIGHT: 272 LBS | TEMPERATURE: 97.9 F | DIASTOLIC BLOOD PRESSURE: 73 MMHG | SYSTOLIC BLOOD PRESSURE: 124 MMHG | HEIGHT: 69 IN | BODY MASS INDEX: 40.29 KG/M2

## 2022-08-17 DIAGNOSIS — K21.00 GASTROESOPHAGEAL REFLUX DISEASE WITH ESOPHAGITIS WITHOUT HEMORRHAGE: Primary | ICD-10-CM

## 2022-08-17 DIAGNOSIS — Z98.890 POSTOPERATIVE STATE: ICD-10-CM

## 2022-08-17 PROCEDURE — 99212 OFFICE O/P EST SF 10 MIN: CPT | Performed by: SURGERY

## 2022-08-17 RX ORDER — CETIRIZINE HYDROCHLORIDE 10 MG/1
10 TABLET ORAL DAILY
COMMUNITY
End: 2024-02-07

## 2022-08-17 ASSESSMENT — PAIN SCALES - GENERAL: PAINLEVEL: NO PAIN (0)

## 2022-08-17 NOTE — PATIENT INSTRUCTIONS
Thank you for allowing Dr. Mcduffie and our surgical team to participate in your care. Please call our health unit coordinator at [phone] with scheduling questions or the nurse at [phone] with any other questions or concerns.

## 2022-08-17 NOTE — NURSING NOTE
"Chief Complaint   Patient presents with     RECHECK     PPI follow up, acid reflux       Initial /73 (BP Location: Right arm, Patient Position: Sitting, Cuff Size: Adult Large)   Pulse 89   Temp 97.9  F (36.6  C) (Tympanic)   Ht 1.753 m (5' 9\")   Wt 123.4 kg (272 lb)   LMP 08/18/2013   SpO2 97%   BMI 40.17 kg/m   Estimated body mass index is 40.17 kg/m  as calculated from the following:    Height as of this encounter: 1.753 m (5' 9\").    Weight as of this encounter: 123.4 kg (272 lb).  Medication Reconciliation: complete     Chel Fontenot LPN    "

## 2022-08-17 NOTE — PROGRESS NOTES
"[facility] Progress Note    HPI: RTC for follow up of both upper and lower endoscopy for dysphagia and abnormal esophogram.       IMPRESSION:     Moderate esophageal dysmotility.     No hiatal hernia. No gastroesophageal reflux was observed during the  course of the exam.      A.  Stomach, antrum, biopsy:  -Mild chronic antral gastritis.     B.  Gastroesophageal junction, biopsy:  -Carditis with reactive foveolar changes.  -No definitive intestinal metaplasia identified.  -Negative for dysplasia.     C.  Esophagus, biopsy:  -Esophagitis with increased eosinophils (see comment).     D.  Colon, polyp at 20 cm (x2), polypectomy:  -Hyperplastic polyp(s).      S: She is doing well on the omeprazole and her swallowing has improved. She denies pain or other concerns.     O:   Vitals:  /73 (BP Location: Right arm, Patient Position: Sitting, Cuff Size: Adult Large)   Pulse 89   Temp 97.9  F (36.6  C) (Tympanic)   Ht 1.753 m (5' 9\")   Wt 123.4 kg (272 lb)   LMP 08/18/2013   SpO2 97%   BMI 40.17 kg/m        Physical Exam:  No exam performed.     Assessment/Plan:  %1 y.o. female found to have some findings on pathology that could be consistent with eosinophilic esophagitis. It could also represent reflux esophagitis and her symptoms have improved on a PPI which makes this the more likely diagnosis. I discussed if she feels her symptoms are recurring on PPI that we would repeat her upper endoscopy and likely trial some oral budesonide in a slurry to see if this helps but if her symptoms have improved on PPI would hold off on this for now.     Jorge Mcduffie MD     "

## 2022-09-04 ENCOUNTER — HEALTH MAINTENANCE LETTER (OUTPATIENT)
Age: 52
End: 2022-09-04

## 2022-09-17 DIAGNOSIS — I10 ESSENTIAL HYPERTENSION WITH GOAL BLOOD PRESSURE LESS THAN 140/90: ICD-10-CM

## 2022-09-19 RX ORDER — VALSARTAN AND HYDROCHLOROTHIAZIDE 80; 12.5 MG/1; MG/1
TABLET, FILM COATED ORAL
Qty: 90 TABLET | Refills: 3 | Status: SHIPPED | OUTPATIENT
Start: 2022-09-19 | End: 2023-09-15

## 2022-09-28 DIAGNOSIS — E11.9 TYPE 2 DIABETES MELLITUS WITHOUT COMPLICATION, WITHOUT LONG-TERM CURRENT USE OF INSULIN (H): ICD-10-CM

## 2022-09-28 DIAGNOSIS — Z82.49 FH: CAD (CORONARY ARTERY DISEASE): ICD-10-CM

## 2022-10-03 RX ORDER — PRAVASTATIN SODIUM 20 MG
20 TABLET ORAL DAILY
Qty: 30 TABLET | Refills: 3 | Status: SHIPPED | OUTPATIENT
Start: 2022-10-03 | End: 2022-12-09

## 2022-12-06 ENCOUNTER — TELEPHONE (OUTPATIENT)
Dept: MAMMOGRAPHY | Facility: OTHER | Age: 52
End: 2022-12-06

## 2022-12-06 ENCOUNTER — ANCILLARY PROCEDURE (OUTPATIENT)
Dept: MAMMOGRAPHY | Facility: OTHER | Age: 52
End: 2022-12-06
Attending: FAMILY MEDICINE
Payer: COMMERCIAL

## 2022-12-06 DIAGNOSIS — Z12.31 VISIT FOR SCREENING MAMMOGRAM: ICD-10-CM

## 2022-12-06 PROCEDURE — 77067 SCR MAMMO BI INCL CAD: CPT | Mod: TC | Performed by: RADIOLOGY

## 2022-12-06 PROCEDURE — 77063 BREAST TOMOSYNTHESIS BI: CPT | Mod: TC | Performed by: RADIOLOGY

## 2022-12-09 ENCOUNTER — MYC MEDICAL ADVICE (OUTPATIENT)
Dept: FAMILY MEDICINE | Facility: OTHER | Age: 52
End: 2022-12-09

## 2022-12-12 ENCOUNTER — IMMUNIZATION (OUTPATIENT)
Dept: FAMILY MEDICINE | Facility: OTHER | Age: 52
End: 2022-12-12
Attending: FAMILY MEDICINE
Payer: COMMERCIAL

## 2022-12-12 DIAGNOSIS — Z23 NEED FOR PROPHYLACTIC VACCINATION AND INOCULATION AGAINST INFLUENZA: Primary | ICD-10-CM

## 2022-12-12 DIAGNOSIS — Z23 NEED FOR VACCINATION: ICD-10-CM

## 2022-12-12 PROCEDURE — 91312 COVID-19 VACCINE BIVALENT BOOSTER 12+ (PFIZER): CPT

## 2022-12-12 PROCEDURE — 90472 IMMUNIZATION ADMIN EACH ADD: CPT

## 2022-12-12 PROCEDURE — 90677 PCV20 VACCINE IM: CPT

## 2022-12-12 PROCEDURE — 90471 IMMUNIZATION ADMIN: CPT

## 2022-12-12 PROCEDURE — 90682 RIV4 VACC RECOMBINANT DNA IM: CPT

## 2022-12-12 PROCEDURE — 0124A COVID-19 VACCINE BIVALENT BOOSTER 12+ (PFIZER): CPT

## 2023-01-04 DIAGNOSIS — G47.33 OBSTRUCTIVE SLEEP APNEA: Primary | ICD-10-CM

## 2023-01-15 ENCOUNTER — HEALTH MAINTENANCE LETTER (OUTPATIENT)
Age: 53
End: 2023-01-15

## 2023-02-03 ENCOUNTER — OFFICE VISIT (OUTPATIENT)
Dept: FAMILY MEDICINE | Facility: OTHER | Age: 53
End: 2023-02-03
Attending: FAMILY MEDICINE
Payer: COMMERCIAL

## 2023-02-03 ENCOUNTER — TELEPHONE (OUTPATIENT)
Dept: FAMILY MEDICINE | Facility: OTHER | Age: 53
End: 2023-02-03

## 2023-02-03 ENCOUNTER — LAB (OUTPATIENT)
Dept: LAB | Facility: OTHER | Age: 53
End: 2023-02-03
Payer: COMMERCIAL

## 2023-02-03 VITALS
SYSTOLIC BLOOD PRESSURE: 126 MMHG | WEIGHT: 283 LBS | DIASTOLIC BLOOD PRESSURE: 83 MMHG | BODY MASS INDEX: 41.79 KG/M2 | RESPIRATION RATE: 16 BRPM | TEMPERATURE: 98.3 F | HEART RATE: 78 BPM | OXYGEN SATURATION: 98 %

## 2023-02-03 DIAGNOSIS — E66.01 MORBID OBESITY (H): ICD-10-CM

## 2023-02-03 DIAGNOSIS — E11.9 TYPE 2 DIABETES MELLITUS WITHOUT COMPLICATION, WITHOUT LONG-TERM CURRENT USE OF INSULIN (H): Primary | ICD-10-CM

## 2023-02-03 DIAGNOSIS — E11.9 TYPE 2 DIABETES MELLITUS WITHOUT COMPLICATION, WITHOUT LONG-TERM CURRENT USE OF INSULIN (H): ICD-10-CM

## 2023-02-03 DIAGNOSIS — Z23 NEED FOR VACCINATION: ICD-10-CM

## 2023-02-03 DIAGNOSIS — E78.2 MIXED HYPERLIPIDEMIA: ICD-10-CM

## 2023-02-03 DIAGNOSIS — I10 ESSENTIAL HYPERTENSION WITH GOAL BLOOD PRESSURE LESS THAN 140/90: ICD-10-CM

## 2023-02-03 LAB
ALBUMIN SERPL BCG-MCNC: 4.3 G/DL (ref 3.5–5.2)
ALP SERPL-CCNC: 61 U/L (ref 35–104)
ALT SERPL W P-5'-P-CCNC: 62 U/L (ref 10–35)
ANION GAP SERPL CALCULATED.3IONS-SCNC: 12 MMOL/L (ref 7–15)
AST SERPL W P-5'-P-CCNC: 29 U/L (ref 10–35)
BILIRUB SERPL-MCNC: 0.5 MG/DL
BUN SERPL-MCNC: 13.6 MG/DL (ref 6–20)
CALCIUM SERPL-MCNC: 9.1 MG/DL (ref 8.6–10)
CHLORIDE SERPL-SCNC: 103 MMOL/L (ref 98–107)
CHOLEST SERPL-MCNC: 233 MG/DL
CREAT SERPL-MCNC: 0.75 MG/DL (ref 0.51–0.95)
CREAT UR-MCNC: 227.2 MG/DL
DEPRECATED HCO3 PLAS-SCNC: 24 MMOL/L (ref 22–29)
EST. AVERAGE GLUCOSE BLD GHB EST-MCNC: 148 MG/DL
GFR SERPL CREATININE-BSD FRML MDRD: >90 ML/MIN/1.73M2
GLUCOSE SERPL-MCNC: 149 MG/DL (ref 70–99)
HBA1C MFR BLD: 6.8 %
HDLC SERPL-MCNC: 36 MG/DL
LDLC SERPL CALC-MCNC: 158 MG/DL
MICROALBUMIN UR-MCNC: 40.8 MG/L
MICROALBUMIN/CREAT UR: 17.96 MG/G CR (ref 0–25)
NONHDLC SERPL-MCNC: 197 MG/DL
POTASSIUM SERPL-SCNC: 4 MMOL/L (ref 3.4–5.3)
PROT SERPL-MCNC: 6.8 G/DL (ref 6.4–8.3)
SODIUM SERPL-SCNC: 139 MMOL/L (ref 136–145)
TRIGL SERPL-MCNC: 194 MG/DL

## 2023-02-03 PROCEDURE — 90472 IMMUNIZATION ADMIN EACH ADD: CPT | Performed by: FAMILY MEDICINE

## 2023-02-03 PROCEDURE — 36415 COLL VENOUS BLD VENIPUNCTURE: CPT

## 2023-02-03 PROCEDURE — 99214 OFFICE O/P EST MOD 30 MIN: CPT | Mod: 25 | Performed by: FAMILY MEDICINE

## 2023-02-03 PROCEDURE — 90471 IMMUNIZATION ADMIN: CPT | Performed by: FAMILY MEDICINE

## 2023-02-03 PROCEDURE — 80053 COMPREHEN METABOLIC PANEL: CPT

## 2023-02-03 PROCEDURE — 90715 TDAP VACCINE 7 YRS/> IM: CPT | Performed by: FAMILY MEDICINE

## 2023-02-03 PROCEDURE — 82043 UR ALBUMIN QUANTITATIVE: CPT

## 2023-02-03 PROCEDURE — 90750 HZV VACC RECOMBINANT IM: CPT | Performed by: FAMILY MEDICINE

## 2023-02-03 PROCEDURE — 82570 ASSAY OF URINE CREATININE: CPT

## 2023-02-03 PROCEDURE — 83036 HEMOGLOBIN GLYCOSYLATED A1C: CPT

## 2023-02-03 PROCEDURE — 80061 LIPID PANEL: CPT

## 2023-02-03 ASSESSMENT — PAIN SCALES - GENERAL: PAINLEVEL: NO PAIN (0)

## 2023-02-03 NOTE — PROGRESS NOTES
"  Assessment & Plan     Type 2 diabetes mellitus without complication, without long-term current use of insulin (H)  Good control - A1C up though. Discussed. Maybe good candidate for Oxemplic-- R/B discussed with this and her wt issue. Pt will consider it. Pt will call if wants to proceed. Keep working on diet and exercise   Follow-up in 3-4month   - Comprehensive metabolic panel; Future  - Hemoglobin A1c; Future  - Lipid Profile; Future  - Albumin Random Urine Quantitative with Creat Ratio; Future    Essential hypertension with goal blood pressure less than 140/90  Stable   - Comprehensive metabolic panel; Future  - Hemoglobin A1c; Future  - Lipid Profile; Future  - Albumin Random Urine Quantitative with Creat Ratio; Future    Morbid obesity (H)  As above - work on diet and exercise. Discussed R/B of Ozemplic- she will think about it.   - Comprehensive metabolic panel; Future  - Hemoglobin A1c; Future  - Lipid Profile; Future  - Albumin Random Urine Quantitative with Creat Ratio; Future    Mixed hyperlipidemia  Does not tolerate statin. Discussed. Pt is going to try Red Yeast and Fish oil and recheck at next visit.  Discussed Tricor/Zetia   - Comprehensive metabolic panel; Future  - Hemoglobin A1c; Future  - Lipid Profile; Future  - Albumin Random Urine Quantitative with Creat Ratio; Future    Need for vaccination  Shot given                BMI:   Estimated body mass index is 41.79 kg/m  as calculated from the following:    Height as of 8/17/22: 1.753 m (5' 9\").    Weight as of this encounter: 128.4 kg (283 lb).           No follow-ups on file.    Manohar Villareal MD  [facility] - JAGDEEP Caldwell is a 52 year old, presenting for the following health issues:  Recheck Medication      HPI     Diabetes Follow-up    How often are you checking your blood sugar? A few times a week  What time of day are you checking your blood sugars (select all that apply)?  Before and after meals  Have you had " any blood sugars above 200?  No  Have you had any blood sugars below 70?  No    What symptoms do you notice when your blood sugar is low?  None    What concerns do you have today about your diabetes? None     Do you have any of these symptoms? (Select all that apply)  No numbness or tingling in feet.  No redness, sores or blisters on feet.  No complaints of excessive thirst.  No reports of blurry vision.  No significant changes to weight.    Have you had a diabetic eye exam in the last 12 months? No                  Hyperlipidemia Follow-Up      Are you regularly taking any medication or supplement to lower your cholesterol?   No    Are you having muscle aches or other side effects that you think could be caused by your cholesterol lowering medication?  No    Hypertension Follow-up      Do you check your blood pressure regularly outside of the clinic? No     Are you following a low salt diet? Yes    Are your blood pressures ever more than 140 on the top number (systolic) OR more   than 90 on the bottom number (diastolic), for example 140/90? No    BP Readings from Last 2 Encounters:   02/03/23 126/83   08/17/22 124/73     Hemoglobin A1C (%)   Date Value   04/27/2022 6.6 (H)     LDL Cholesterol Calculated (mg/dL)   Date Value   04/27/2022 84   08/26/2020 83   03/25/2020 68           Review of Systems   Constitutional, HEENT, cardiovascular, pulmonary, gi and gu systems are negative, except as otherwise noted.      Objective    /83 (BP Location: Right arm, Patient Position: Sitting, Cuff Size: Adult Large)   Pulse 78   Temp 98.3  F (36.8  C) (Tympanic)   Resp 16   Wt 128.4 kg (283 lb)   LMP 08/18/2013   SpO2 98%   BMI 41.79 kg/m    Body mass index is 41.79 kg/m .  Physical Exam   GENERAL: healthy, alert and no distress  NECK: no adenopathy, no asymmetry, masses, or scars and thyroid normal to palpation  RESP: lungs clear to auscultation - no rales, rhonchi or wheezes  CV: regular rate and rhythm, normal  S1 S2, no S3 or S4, no murmur, click or rub, no peripheral edema and peripheral pulses strong  ABDOMEN: soft, nontender, no hepatosplenomegaly, no masses and bowel sounds normal  MS: no gross musculoskeletal defects noted, no edema    Results for orders placed or performed in visit on 02/03/23   Albumin Random Urine Quantitative with Creat Ratio     Status: None   Result Value Ref Range    Creatinine Urine mg/dL 227.2 mg/dL    Albumin Urine mg/L 40.8 mg/L    Albumin Urine mg/g Cr 17.96 0.00 - 25.00 mg/g Cr   Lipid Profile     Status: Abnormal   Result Value Ref Range    Cholesterol 233 (H) <200 mg/dL    Triglycerides 194 (H) <150 mg/dL    Direct Measure HDL 36 (L) >=50 mg/dL    LDL Cholesterol Calculated 158 (H) <=100 mg/dL    Non HDL Cholesterol 197 (H) <130 mg/dL    Narrative    Cholesterol  Desirable:  <200 mg/dL    Triglycerides  Normal:  Less than 150 mg/dL  Borderline High:  150-199 mg/dL  High:  200-499 mg/dL  Very High:  Greater than or equal to 500 mg/dL    Direct Measure HDL  Female:  Greater than or equal to 50 mg/dL   Male:  Greater than or equal to 40 mg/dL    LDL Cholesterol  Desirable:  <100mg/dL  Above Desirable:  100-129 mg/dL   Borderline High:  130-159 mg/dL   High:  160-189 mg/dL   Very High:  >= 190 mg/dL    Non HDL Cholesterol  Desirable:  130 mg/dL  Above Desirable:  130-159 mg/dL  Borderline High:  160-189 mg/dL  High:  190-219 mg/dL  Very High:  Greater than or equal to 220 mg/dL   Hemoglobin A1c     Status: Abnormal   Result Value Ref Range    Estimated Average Glucose 148 mg/dL    Hemoglobin A1C 6.8 (H) <5.7 %   Comprehensive metabolic panel     Status: Abnormal   Result Value Ref Range    Sodium 139 136 - 145 mmol/L    Potassium 4.0 3.4 - 5.3 mmol/L    Chloride 103 98 - 107 mmol/L    Carbon Dioxide (CO2) 24 22 - 29 mmol/L    Anion Gap 12 7 - 15 mmol/L    Urea Nitrogen 13.6 6.0 - 20.0 mg/dL    Creatinine 0.75 0.51 - 0.95 mg/dL    Calcium 9.1 8.6 - 10.0 mg/dL    Glucose 149 (H) 70 - 99  mg/dL    Alkaline Phosphatase 61 35 - 104 U/L    AST 29 10 - 35 U/L    ALT 62 (H) 10 - 35 U/L    Protein Total 6.8 6.4 - 8.3 g/dL    Albumin 4.3 3.5 - 5.2 g/dL    Bilirubin Total 0.5 <=1.2 mg/dL    GFR Estimate >90 >60 mL/min/1.73m2

## 2023-02-03 NOTE — TELEPHONE ENCOUNTER
----- Message from Manohar Villareal MD sent at 2/3/2023 10:12 AM CST -----  See if can get her Eye exam report at [facility]

## 2023-05-03 ENCOUNTER — LAB (OUTPATIENT)
Dept: LAB | Facility: OTHER | Age: 53
End: 2023-05-03
Payer: COMMERCIAL

## 2023-05-03 ENCOUNTER — OFFICE VISIT (OUTPATIENT)
Dept: FAMILY MEDICINE | Facility: OTHER | Age: 53
End: 2023-05-03
Attending: FAMILY MEDICINE
Payer: COMMERCIAL

## 2023-05-03 VITALS
OXYGEN SATURATION: 96 % | HEART RATE: 70 BPM | WEIGHT: 288 LBS | TEMPERATURE: 97.8 F | BODY MASS INDEX: 42.53 KG/M2 | RESPIRATION RATE: 18 BRPM | SYSTOLIC BLOOD PRESSURE: 128 MMHG | DIASTOLIC BLOOD PRESSURE: 88 MMHG

## 2023-05-03 DIAGNOSIS — E11.9 TYPE 2 DIABETES MELLITUS WITHOUT COMPLICATION, WITHOUT LONG-TERM CURRENT USE OF INSULIN (H): ICD-10-CM

## 2023-05-03 DIAGNOSIS — E78.2 MIXED HYPERLIPIDEMIA: ICD-10-CM

## 2023-05-03 DIAGNOSIS — I10 ESSENTIAL HYPERTENSION WITH GOAL BLOOD PRESSURE LESS THAN 140/90: ICD-10-CM

## 2023-05-03 DIAGNOSIS — E66.01 MORBID OBESITY (H): ICD-10-CM

## 2023-05-03 DIAGNOSIS — E11.9 TYPE 2 DIABETES MELLITUS WITHOUT COMPLICATION, WITHOUT LONG-TERM CURRENT USE OF INSULIN (H): Primary | ICD-10-CM

## 2023-05-03 DIAGNOSIS — Z62.820 COUNSELING FOR PARENT-CHILD CONFLICT: ICD-10-CM

## 2023-05-03 DIAGNOSIS — Z71.89 COUNSELING FOR PARENT-CHILD CONFLICT: ICD-10-CM

## 2023-05-03 LAB
ALBUMIN SERPL BCG-MCNC: 4.4 G/DL (ref 3.5–5.2)
ALP SERPL-CCNC: 65 U/L (ref 35–104)
ALT SERPL W P-5'-P-CCNC: 57 U/L (ref 10–35)
ANION GAP SERPL CALCULATED.3IONS-SCNC: 8 MMOL/L (ref 7–15)
AST SERPL W P-5'-P-CCNC: 32 U/L (ref 10–35)
BILIRUB SERPL-MCNC: 0.4 MG/DL
BUN SERPL-MCNC: 13.3 MG/DL (ref 6–20)
CALCIUM SERPL-MCNC: 9.6 MG/DL (ref 8.6–10)
CHLORIDE SERPL-SCNC: 103 MMOL/L (ref 98–107)
CHOLEST SERPL-MCNC: 203 MG/DL
CREAT SERPL-MCNC: 0.81 MG/DL (ref 0.51–0.95)
DEPRECATED HCO3 PLAS-SCNC: 27 MMOL/L (ref 22–29)
EST. AVERAGE GLUCOSE BLD GHB EST-MCNC: 151 MG/DL
GFR SERPL CREATININE-BSD FRML MDRD: 87 ML/MIN/1.73M2
GLUCOSE SERPL-MCNC: 144 MG/DL (ref 70–99)
HBA1C MFR BLD: 6.9 %
HDLC SERPL-MCNC: 38 MG/DL
LDLC SERPL CALC-MCNC: 136 MG/DL
NONHDLC SERPL-MCNC: 165 MG/DL
POTASSIUM SERPL-SCNC: 4.1 MMOL/L (ref 3.4–5.3)
PROT SERPL-MCNC: 7.4 G/DL (ref 6.4–8.3)
SODIUM SERPL-SCNC: 138 MMOL/L (ref 136–145)
TRIGL SERPL-MCNC: 146 MG/DL

## 2023-05-03 PROCEDURE — 36415 COLL VENOUS BLD VENIPUNCTURE: CPT

## 2023-05-03 PROCEDURE — 80061 LIPID PANEL: CPT

## 2023-05-03 PROCEDURE — 83036 HEMOGLOBIN GLYCOSYLATED A1C: CPT

## 2023-05-03 PROCEDURE — 80053 COMPREHEN METABOLIC PANEL: CPT

## 2023-05-03 PROCEDURE — 99214 OFFICE O/P EST MOD 30 MIN: CPT | Performed by: FAMILY MEDICINE

## 2023-05-03 ASSESSMENT — PAIN SCALES - GENERAL: PAINLEVEL: NO PAIN (0)

## 2023-05-03 NOTE — PROGRESS NOTES
"  Assessment & Plan     Type 2 diabetes mellitus without complication, without long-term current use of insulin (H)  Good control but wt up - discussed.  Pt has not checked into insurance coverage for GLP1. Continue current medications and behavioral changes.   Follow-up in 6 months   - [facility] FOOT EXAM  NO CHARGE  - Hemoglobin A1c; Future    Essential hypertension with goal blood pressure less than 140/90  Stable overall. Did not take BP meds today   - Lipid Profile; Future  - Comprehensive metabolic panel; Future    Morbid obesity (H)  Discussed. Work on wt loss.   - Lipid Profile; Future    Mixed hyperlipidemia  Better- does not tolerate statins  - Lipid Profile; Future  - Comprehensive metabolic panel; Future    Counseling for parent-child conflict  Long discussion on this. Ideas given - ultimately needs counseling to help child                BMI:   Estimated body mass index is 42.53 kg/m  as calculated from the following:    Height as of 8/17/22: 1.753 m (5' 9\").    Weight as of this encounter: 130.6 kg (288 lb).           No follow-ups on file.    Manohar Villareal MD  [facility] - JAGDEEP Caldwell is a 52 year old, presenting for the following health issues:  Recheck Medication        5/3/2023     8:45 AM   Additional Questions   Roomed by leonardo ellsworth     [facility] [place]     Diabetes Follow-up    How often are you checking your blood sugar? A few times a week  What time of day are you checking your blood sugars (select all that apply)?  Before and after meals  Have you had any blood sugars above 200?  No  Have you had any blood sugars below 70?  No    What symptoms do you notice when your blood sugar is low?  None    What concerns do you have today about your diabetes? None     Do you have any of these symptoms? (Select all that apply)  No numbness or tingling in feet.  No redness, sores or blisters on feet.  No complaints of excessive thirst.  No reports of blurry vision.  No significant changes " to weight.    Have you had a diabetic eye exam in the last 12 months? No     Wt is up  1. foot deformity   No  2. Current or previous foot ulceration     No  3. Current or previous pre-ulcerative calluses     No  4. previous partial amputation of one or both feet or complete amputation of one foot     No  5. peripheral neuropathy with evidence of callus formation     No  6. poor circulation     No            Hyperlipidemia Follow-Up      Are you regularly taking any medication or supplement to lower your cholesterol?   No    Are you having muscle aches or other side effects that you think could be caused by your cholesterol lowering medication?  No    Hypertension Follow-up      Do you check your blood pressure regularly outside of the clinic? No     Are you following a low salt diet? No    Are your blood pressures ever more than 140 on the top number (systolic) OR more   than 90 on the bottom number (diastolic), for example 140/90? Yes    BP Readings from Last 2 Encounters:   05/03/23 (!) 142/93   02/03/23 126/83     Hemoglobin A1C (%)   Date Value   02/03/2023 6.8 (H)   04/27/2022 6.6 (H)     LDL Cholesterol Calculated (mg/dL)   Date Value   02/03/2023 158 (H)   04/27/2022 84   08/26/2020 83   03/25/2020 68         Dealing with lots of emotional issues with dtr who is my pt.           Review of Systems   Constitutional, HEENT, cardiovascular, pulmonary, gi and gu systems are negative, except as otherwise noted.      Objective    /88   Pulse 70   Temp 97.8  F (36.6  C) (Tympanic)   Resp 18   Wt 130.6 kg (288 lb)   LMP 08/18/2013   SpO2 96%   BMI 42.53 kg/m    Body mass index is 42.53 kg/m .  Physical Exam   GENERAL: healthy, alert and no distress  RESP: lungs clear to auscultation - no rales, rhonchi or wheezes  CV: regular rate and rhythm, normal S1 S2, no S3 or S4, no murmur, click or rub, no peripheral edema and peripheral pulses strong  MS: no gross musculoskeletal defects noted, no edema  Diabetic  foot exam: normal DP and PT pulses, no trophic changes or ulcerative lesions, normal sensory exam and normal monofilament exam    Results for orders placed or performed in visit on 05/03/23   Lipid Profile     Status: Abnormal   Result Value Ref Range    Cholesterol 203 (H) <200 mg/dL    Triglycerides 146 <150 mg/dL    Direct Measure HDL 38 (L) >=50 mg/dL    LDL Cholesterol Calculated 136 (H) <=100 mg/dL    Non HDL Cholesterol 165 (H) <130 mg/dL    Narrative    Cholesterol  Desirable:  <200 mg/dL    Triglycerides  Normal:  Less than 150 mg/dL  Borderline High:  150-199 mg/dL  High:  200-499 mg/dL  Very High:  Greater than or equal to 500 mg/dL    Direct Measure HDL  Female:  Greater than or equal to 50 mg/dL   Male:  Greater than or equal to 40 mg/dL    LDL Cholesterol  Desirable:  <100mg/dL  Above Desirable:  100-129 mg/dL   Borderline High:  130-159 mg/dL   High:  160-189 mg/dL   Very High:  >= 190 mg/dL    Non HDL Cholesterol  Desirable:  130 mg/dL  Above Desirable:  130-159 mg/dL  Borderline High:  160-189 mg/dL  High:  190-219 mg/dL  Very High:  Greater than or equal to 220 mg/dL   Hemoglobin A1c     Status: Abnormal   Result Value Ref Range    Estimated Average Glucose 151 mg/dL    Hemoglobin A1C 6.9 (H) <5.7 %   Comprehensive metabolic panel     Status: Abnormal   Result Value Ref Range    Sodium 138 136 - 145 mmol/L    Potassium 4.1 3.4 - 5.3 mmol/L    Chloride 103 98 - 107 mmol/L    Carbon Dioxide (CO2) 27 22 - 29 mmol/L    Anion Gap 8 7 - 15 mmol/L    Urea Nitrogen 13.3 6.0 - 20.0 mg/dL    Creatinine 0.81 0.51 - 0.95 mg/dL    Calcium 9.6 8.6 - 10.0 mg/dL    Glucose 144 (H) 70 - 99 mg/dL    Alkaline Phosphatase 65 35 - 104 U/L    AST 32 10 - 35 U/L    ALT 57 (H) 10 - 35 U/L    Protein Total 7.4 6.4 - 8.3 g/dL    Albumin 4.4 3.5 - 5.2 g/dL    Bilirubin Total 0.4 <=1.2 mg/dL    GFR Estimate 87 >60 mL/min/1.73m2

## 2023-06-12 ENCOUNTER — TELEPHONE (OUTPATIENT)
Dept: FAMILY MEDICINE | Facility: OTHER | Age: 53
End: 2023-06-12

## 2023-06-12 ENCOUNTER — OFFICE VISIT (OUTPATIENT)
Dept: FAMILY MEDICINE | Facility: OTHER | Age: 53
End: 2023-06-12
Attending: FAMILY MEDICINE
Payer: COMMERCIAL

## 2023-06-12 VITALS
WEIGHT: 290.9 LBS | DIASTOLIC BLOOD PRESSURE: 85 MMHG | BODY MASS INDEX: 43.09 KG/M2 | SYSTOLIC BLOOD PRESSURE: 131 MMHG | TEMPERATURE: 97.8 F | HEIGHT: 69 IN | OXYGEN SATURATION: 95 % | HEART RATE: 77 BPM

## 2023-06-12 DIAGNOSIS — H60.501 ACUTE OTITIS EXTERNA OF RIGHT EAR, UNSPECIFIED TYPE: Primary | ICD-10-CM

## 2023-06-12 PROCEDURE — 99213 OFFICE O/P EST LOW 20 MIN: CPT | Performed by: FAMILY MEDICINE

## 2023-06-12 RX ORDER — NEOMYCIN SULFATE, POLYMYXIN B SULFATE, HYDROCORTISONE 3.5; 10000; 1 MG/ML; [USP'U]/ML; MG/ML
3 SOLUTION/ DROPS AURICULAR (OTIC) 4 TIMES DAILY
Qty: 10 ML | Refills: 1 | Status: SHIPPED | OUTPATIENT
Start: 2023-06-12 | End: 2023-11-02

## 2023-06-12 RX ORDER — AZITHROMYCIN 250 MG/1
TABLET, FILM COATED ORAL
Qty: 6 TABLET | Refills: 0 | Status: CANCELLED | OUTPATIENT
Start: 2023-06-12 | End: 2023-06-17

## 2023-06-12 ASSESSMENT — PAIN SCALES - GENERAL: PAINLEVEL: EXTREME PAIN (8)

## 2023-06-12 NOTE — TELEPHONE ENCOUNTER
8:02 AM    Reason for Call: OVERBOOK    Patient is having the following symptoms: Patient needs to be see for possible ear infection. days.    The patient is requesting an appointment for Overbook with .    Was an appointment offered for this call? Yes  If yes : Appointment type              Date   07/23    Preferred method for responding to this message: Telephone Call  What is your phone number ?  [phone]    If we cannot reach you directly, may we leave a detailed response at the number you provided? Yes    Can this message wait until your PCP/provider returns, if unavailable today? YES, Provider is in today    Jadyn Garduno

## 2023-06-12 NOTE — PROGRESS NOTES
"  Assessment & Plan     1. Acute otitis externa of right ear, unspecified type  Pain with right ear manipulation and visibly swollen canal. This appears to be AOE. Placed cotton wick in ear with instructions to put cortisporin - 3 drops in 4 times daily for next 5-7 days. Return if symptoms worsen or persist. She is comfortable with  Plan.  - neomycin-polymyxin-hydrocortisone (CORTISPORIN) 3.5-70237-1 otic solution; Place 3 drops into the right ear 4 times daily  Dispense: 10 mL; Refill: 1             BMI:   Estimated body mass index is 42.96 kg/m  as calculated from the following:    Height as of this encounter: 1.753 m (5' 9\").    Weight as of this encounter: 132 kg (290 lb 14.4 oz).     No follow-ups on file.    James Hercules MS4    I was present with the medical student for the service. I personally verified the history of present illness and  performed the physical examination and medical decision making. I have verified all of the medical student s  documentation for this encounter. Dr. Manohar Villareal MD  [facility] - JAGDEEP Caldwell is a 52 year old, presenting for the following health issues:  Ear Problem    Worsening R ear pain since last Wednesday.   Feels swollen. Radiates to right shoulder and right jaw. HA on back right of neck. Sore throat for 2 days - hard to swallow.  No clicking.   No sinus pain.  No recent sicknesses or dental work. Wears CPAP and retainer (just started wearing this again)  No recent swimming.    No CP or SoB    Using ear drops, hot towel, cleaning out ears, 800 mg motrin, heating pad- nothing has really helped. Motrin helps a little bit but doesnt go away completely   Zyrtec and benadryl didn't help.          5/3/2023     8:45 AM   Additional Questions   Roomed by leonardo ellsworth     HPI     Acute Illness  Acute illness concerns: possible ear infection   Onset/Duration: last Wednesday   Symptoms:  Fever: No  Chills/Sweats: YES- sweats " "  Headache (location?): YES  Sinus Pressure: No  Conjunctivitis:  No  Ear Pain: YES: right  Rhinorrhea: No  Congestion: YES  Sore Throat: YES  Cough: YES-non-productive  Wheeze: No  Decreased Appetite: No  Nausea: No  Vomiting: No  Diarrhea: No  Dysuria/Freq.: No  Dysuria or Hematuria: No  Fatigue/Achiness: YES  Sick/Strep Exposure: No  Therapies tried and outcome: ear drops, hot towel, cleaning out ears, 800 mg motrin, heating pad- nothing has really helped. Motrin helps a little bit but doesnt go away completely     Pain radiates from ear down to jaw and into neck and head         Review of Systems   Constitutional, HEENT, cardiovascular, pulmonary, GI, , musculoskeletal, neuro, skin, endocrine and psych systems are negative, except as otherwise noted.      Objective    /85 (BP Location: Right arm, Patient Position: Sitting, Cuff Size: Adult Large)   Pulse 77   Temp 97.8  F (36.6  C) (Tympanic)   Ht 1.753 m (5' 9\")   Wt 132 kg (290 lb 14.4 oz)   LMP 08/18/2013   SpO2 95%   BMI 42.96 kg/m    Body mass index is 42.96 kg/m .  Physical Exam   GENERAL: healthy, alert and no distress  EYES: Eyes grossly normal to inspection, PERRL and conjunctivae and sclerae normal  HENT:   Right ear tender to palpitation and movement. Visibly swollen over canal. Right TM erythematous and fluid seen behind TM.  L ear canals and TM's normal, nose and mouth without ulcers or lesions  NECK: no adenopathy, no asymmetry, masses, or scars and thyroid normal to palpation. Right eustachian tube tender to palpation  RESP: lungs clear to auscultation - no rales, rhonchi or wheezes  CV: regular rate and rhythm, normal S1 S2, no S3 or S4, no murmur, click or rub, no peripheral edema and peripheral pulses strong  MS: no gross musculoskeletal defects noted, no edema  SKIN: no suspicious lesions or rashes  NEURO: Normal strength and tone, mentation intact and speech normal  PSYCH: mentation appears normal, affect normal/bright        "

## 2023-06-12 NOTE — TELEPHONE ENCOUNTER
Next 5 appointments (look out 90 days)      Jun 12, 2023  9:15 AM  (Arrive by 9:00 AM)  SHORT with Manohar Villareal MD  [facility] [place] ([facility] [place] ) [address]  [place] [address]  [phone]

## 2023-07-23 ENCOUNTER — HEALTH MAINTENANCE LETTER (OUTPATIENT)
Age: 53
End: 2023-07-23

## 2023-08-09 ENCOUNTER — OFFICE VISIT (OUTPATIENT)
Dept: CHIROPRACTIC MEDICINE | Facility: OTHER | Age: 53
End: 2023-08-09
Attending: CHIROPRACTOR
Payer: COMMERCIAL

## 2023-08-09 DIAGNOSIS — M99.03 SEGMENTAL AND SOMATIC DYSFUNCTION OF LUMBAR REGION: Primary | ICD-10-CM

## 2023-08-09 DIAGNOSIS — M54.50 ACUTE BILATERAL LOW BACK PAIN WITHOUT SCIATICA: ICD-10-CM

## 2023-08-09 DIAGNOSIS — M99.02 SEGMENTAL AND SOMATIC DYSFUNCTION OF THORACIC REGION: ICD-10-CM

## 2023-08-09 PROCEDURE — 98940 CHIROPRACT MANJ 1-2 REGIONS: CPT | Mod: AT | Performed by: CHIROPRACTOR

## 2023-08-09 PROCEDURE — 99212 OFFICE O/P EST SF 10 MIN: CPT | Mod: 25 | Performed by: CHIROPRACTOR

## 2023-08-16 NOTE — PROGRESS NOTES
Subjective Finding:    Chief compalint: Patient presents with:  Back Pain: Low back pain and stiffness over the past several days   , Pain Scale: 5/10, Intensity: dull, Duration: 2 days, Radiating: no.    Date of injury:     Activities that the pain restricts:   Home/household/hobbies/social activities: Yes.  Work duties: No.  Sleep: No.  Makes symptoms better: rest.  Makes symptoms worse: activity.  Have you seen anyone else for the symptoms? No.  Work related: No.  Automobile related injury: No.    Objective and Assessment:    Posture Analysis:   High shoulder: .  Head tilt: .  High iliac crest: .  Head carriage: neutral.  Thoracic Kyphosis: neutral.  Lumbar Lordosis: forward.    Lumbar Range of Motion: extension decreased.  Cervical Range of Motion: .  Thoracic Range of Motion: .  Extremity Range of Motion: .    Palpation:   Quad lumb: bilateral, referred pain: no    Segmental dysfunction pre-treatment and treatment area: T7, L3, L4, and L5.    Assessment post-treatment:  Cervical: .  Thoracic: ROM increased.  Lumbar: ROM increased.    Comments: .      Complicating Factors: .    Procedure(s):  CMT:  50227 Chiropractic manipulative treatment 2 regions performed   Thoracic: Diversified, See above for level, Prone and Lumbar: Diversified, See above for level, Side posture    Modalities:  None performed this visit    Therapeutic procedures:  None    Plan:  Treatment plan: PRN.  Instructed patient: walk 10 minutes.  Short term goals: reduce pain.  Long term goals: restore normal function.  Prognosis: excellent.

## 2023-08-29 ENCOUNTER — OFFICE VISIT (OUTPATIENT)
Dept: OTOLARYNGOLOGY | Facility: OTHER | Age: 53
End: 2023-08-29
Attending: NURSE PRACTITIONER
Payer: COMMERCIAL

## 2023-08-29 VITALS
OXYGEN SATURATION: 97 % | TEMPERATURE: 97.5 F | SYSTOLIC BLOOD PRESSURE: 128 MMHG | HEART RATE: 80 BPM | WEIGHT: 250 LBS | BODY MASS INDEX: 37.03 KG/M2 | HEIGHT: 69 IN | DIASTOLIC BLOOD PRESSURE: 76 MMHG

## 2023-08-29 DIAGNOSIS — H93.8X3 SENSATION OF PLUGGED EAR ON BOTH SIDES: ICD-10-CM

## 2023-08-29 DIAGNOSIS — H92.01 OTALGIA, RIGHT: ICD-10-CM

## 2023-08-29 DIAGNOSIS — J01.40 ACUTE PANSINUSITIS, RECURRENCE NOT SPECIFIED: Primary | ICD-10-CM

## 2023-08-29 PROCEDURE — 92504 EAR MICROSCOPY EXAMINATION: CPT | Performed by: NURSE PRACTITIONER

## 2023-08-29 PROCEDURE — 99213 OFFICE O/P EST LOW 20 MIN: CPT | Mod: 25 | Performed by: NURSE PRACTITIONER

## 2023-08-29 PROCEDURE — 31231 NASAL ENDOSCOPY DX: CPT | Performed by: NURSE PRACTITIONER

## 2023-08-29 RX ORDER — BUDESONIDE 0.5 MG/2ML
INHALANT ORAL
Qty: 60 ML | Refills: 4 | Status: SHIPPED | OUTPATIENT
Start: 2023-08-29 | End: 2023-12-01

## 2023-08-29 RX ORDER — CEFDINIR 300 MG/1
300 CAPSULE ORAL 2 TIMES DAILY
Qty: 20 CAPSULE | Refills: 0 | Status: SHIPPED | OUTPATIENT
Start: 2023-08-29 | End: 2023-11-02

## 2023-08-29 RX ORDER — PREDNISONE 10 MG/1
30 TABLET ORAL DAILY
Qty: 15 TABLET | Refills: 0 | Status: SHIPPED | OUTPATIENT
Start: 2023-08-29 | End: 2023-09-03

## 2023-08-29 ASSESSMENT — PAIN SCALES - GENERAL: PAINLEVEL: MODERATE PAIN (4)

## 2023-08-29 NOTE — LETTER
8/29/2023         RE: Paulette Flores  [address]  [place] [address]        Dear Colleague,    Thank you for referring your patient, Paulette Flores, to the [facility]. Please see a copy of my visit note below.    Otolaryngology Note         Chief Complaint:     Patient presents with:  Nasal Congestion           History of Present Illness:     Paulette Flores is a 52 year old female seen today for concerns for right ear infection, saw Dr Villareal and was started on drops.      Ears feel full and plugged.  Right ear has been painful.  She has noted some facial pain and pressure in her max sinuses.  She tried netti pot.  She has GIN and uses CPAP but has been having trouble due to congestion.  She has been using afrin a couple of times per day for a few weeks.  She takes zyrtec daily and has been taking benadryl at night for sneezing and post nasal drainage.      She has been taking advil D with sudafed PE.      No fevers or chills.  Appetite has been good.      She also saw the chiropractor.      No history of sinus surgery    Inhalent panel completed 6/15/2022 shows mild (level 1) sensitivity to elm, cat, horse.  And mild (level 2) sensitivity to dog and Rhizopus.  She has a cat at home but has had this for many years.    No audiogram on file         Medications:     Current Outpatient Rx   Medication Sig Dispense Refill     aspirin 81 MG chewable tablet Take 1 tablet (81 mg) by mouth daily 90 tablet 3     blood glucose (CONTOUR NEXT TEST) test strip Use to test blood sugar 2 times daily 100 strip 11     blood glucose monitoring (NO BRAND SPECIFIED) meter device kit Use to test blood sugar 2 times daily or as directed. 1 kit 0     budesonide (PULMICORT) 0.5 MG/2ML neb solution Mix 240 ml [facility]on Med Sinus rinse, add 0.5 mg budesonide vial, rinse 1/2 bottle in each nostril twice daily 60 mL 4     cefdinir (OMNICEF) 300 MG capsule Take 1 capsule (300 mg) by mouth 2 times daily 20 capsule 0      cetirizine (ZYRTEC) 10 MG tablet Take 10 mg by mouth daily       ibuprofen (ADVIL/MOTRIN) 800 MG tablet Take 1 tablet (800 mg) by mouth every 8 hours as needed for moderate pain 60 tablet 0     Microlet Lancets MISC Use to test blood glucose twice a day 100 each 11     predniSONE (DELTASONE) 10 MG tablet Take 3 tablets (30 mg) by mouth daily for 5 days 15 tablet 0     valsartan-hydrochlorothiazide (DIOVAN HCT) 80-12.5 MG tablet TAKE 1 TABLET BY MOUTH EVERY DAY 90 tablet 3     neomycin-polymyxin-hydrocortisone (CORTISPORIN) 3.5-78731-8 otic solution Place 3 drops into the right ear 4 times daily (Patient not taking: Reported on 8/29/2023) 10 mL 1            Allergies:     Allergies: Ace inhibitors, Penicillins, Pravachol [pravastatin], Sulfa antibiotics, Zocor [simvastatin], and Adhesive tape          Past Medical History:     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     Hypertension      Infections of genitourinary tract antepartum 05/27/2009     Metrorrhagia 12/06/2001     Migraine, unspecified, without mention of intractable migraine without mention of status migrainosus 02/23/2001    oral contraceptive related     Pain in limb 08/22/2002     Unspecified disorder of female genital organs 12/13/2006     Unspecified hemorrhage in early pregnancy, antepartum 12/04/2006            Past Surgical History:     Past Surgical History:   Procedure Laterality Date     BREAST SURGERY       CHOLECYSTECTOMY       COLONOSCOPY       ENDOSCOPY UPPER, COLONOSCOPY, COMBINED N/A 07/15/2022    Procedure: Upper Endoscopy and Colonoscopy with biopsy, with polypectomy;  Surgeon: Jorge Mcduffie MD;  Location: [facility] OR     GYN SURGERY      laparoscopic supracervical hysterectomy,left tube and ovary removal,fulguration of  "endometriosis 8/28/13--[facility] [place]     LAPAROSCOPIC HYSTERECTOMY SUPRACERVICAL, BILATERAL SALPINGO-OOPHORECTOMY, COMBINED  08/28/2013    Procedure: COMBINED LAPAROSCOPIC HYSTERECTOMY SUPRACERVICAL, SALPINGO-OOPHORECTOMY;  LAPAROSCOPIC SUPRACERVICAL HYSTERECTOMY/LAPAROSCOPIC LEFT SALPINGO-OOPHORECTOMY;  Surgeon: Jerrell Thornton MD;  Location: [facility] OR     LAPAROSCOPIC SALPINGO-OOPHORECTOMY Right 11/22/2017    Procedure: LAPAROSCOPIC SALPINGO-OOPHORECTOMY;  LAPAROSCOPIC RIGHT SALPINGO-OOPHORECTOMY excision of right pelvic side wall mass;  Surgeon: Jerrell Thornton MD;  Location: [facility] OR     LAPAROSCOPY DIAGNOSTIC (GENERAL)       MAMMOPLASTY REDUCTION      [facility]  2005     WISDOM TEETH         ENT family history reviewed         Social History:     Social History     Tobacco Use     Smoking status: Never     Smokeless tobacco: Never   Vaping Use     Vaping Use: Never used   Substance Use Topics     Alcohol use: No     Drug use: Never            Review of Systems:     ROS: See HPI         Physical Exam:     /76 (Cuff Size: Adult Large)   Pulse 80   Temp 97.5  F (36.4  C) (Tympanic)   Ht 1.753 m (5' 9\")   Wt 113.4 kg (250 lb)   LMP 08/18/2013   SpO2 97%   BMI 36.92 kg/m      General - The patient is well nourished and well developed, and appears to have good nutritional status.  Alert and oriented to person and place, answers questions and cooperates with examination appropriately.   Head and Face - Normocephalic and atraumatic, with no gross asymmetry noted.  The facial nerve is intact, with strong symmetric movements.  Voice and Breathing - The patient was breathing comfortably without the use of accessory muscles. There was no wheezing, stridor. The patients voice was clear and strong, slight nasal quality to the voice  Ears - External ear normal.  The ears were examined under binocular microscopy and with otoscope.  Bilateral canals are clear.  Bilateral tympanic membranes are intact without effusion " or retraction.  Bony landmarks are intact.  Eyes - Extraocular movements intact, sclera were not icteric or injected.  Mouth - Examination of the oral cavity showed pink, healthy oral mucosa. Dentition in good condition. No lesions or ulcerations noted. The tongue was mobile and midline.   Throat - The walls of the oropharynx were smooth, pink, moist, symmetric, and had no lesions or ulcerations.  The tonsillar pillars and soft palate were symmetric. The uvula was midline on elevation.    Neck - Normal range of motion, no palpable lymphadenopathy.  Palpation of the thyroid was soft and smooth, with no nodules or goiter appreciated.  The trachea was mobile and midline.  Nose - External contour is symmetric, no gross deflection or scars.  Nasal mucosa is pink and moist with no abnormal mucus.  The septum and turbinates were evaluated with nasal speculum, bilateral inferior turbinates are enlarged, pale and boggy.    To evaluate the nose and sinuses, I performed rigid nasal endoscopy.  I sprayed both nares with 2 sprays lidocaine and neosynephrine.     I began with the RIGHT side using a 0 degree rigid nasal endoscope, and then similarly examined the LEFT side     Findings:  Inferior turbinates: Enlarged, pale and boggy  Middle turbinate and middle meatus: Very limited view due to enlarged IT's  The superior meatus is examined and limited due to enlarged IT's  Unable to visualize the sphenoethmoidal recess due to generalized mucosal edema  Unable to visualize the nasopharynx due to edema   No jerald polyps or purulence noted on limited view.  The patient tolerated the procedure well            Assessment and Plan:       ICD-10-CM    1. Acute pansinusitis, recurrence not specified  J01.40 cefdinir (OMNICEF) 300 MG capsule     predniSONE (DELTASONE) 10 MG tablet     budesonide (PULMICORT) 0.5 MG/2ML neb solution        Budesonide instructions  Make the [facility]on Med Saline Solution using 2 packages of salt and previously  boiled or distilled water.  This will make 240 ml of saline solution.  Mix the entire vial of budesonide into the solution.   Irrigate your nose 2 times a day with the warm budesonide solution using 1 bottle between nostrils in the morning, and one bottle at night.     cefdinir (OMNICEF) 300 MG capsule, Take 1 capsule (300 mg) by mouth 2 times daily     predniSONE (DELTASONE) 10 MG tablet, Take 3 tablets (30 mg) by mouth daily for 5 days     Risks of oral steroid use were discussed and include psychiatric/mood changes, insomnia, stomach ulcers and potential GI bleeding, blood sugar elevation/worsening diabetes, hip/bone necrosis or bone demineralization.  Advised to monitor blood sugars while taking.  Follow up right away if they are persistently elevated.     Joyce TORRES  [facility]          Again, thank you for allowing me to participate in the care of your patient.        Sincerely,        Jyoce Lawson NP

## 2023-08-29 NOTE — PROGRESS NOTES
Otolaryngology Note         Chief Complaint:     Patient presents with:  Nasal Congestion           History of Present Illness:     Paulette Flores is a 52 year old female seen today for concerns for right ear infection, saw Dr Villareal and was started on drops.      Ears feel full and plugged.  Right ear has been painful.  She has noted some facial pain and pressure in her max sinuses.  She tried netti pot.  She has GIN and uses CPAP but has been having trouble due to congestion.  She has been using afrin a couple of times per day for a few weeks.  She takes zyrtec daily and has been taking benadryl at night for sneezing and post nasal drainage.      She has been taking advil D with sudafed PE.      No fevers or chills.  Appetite has been good.      She also saw the chiropractor.      No history of sinus surgery    Inhalent panel completed 6/15/2022 shows mild (level 1) sensitivity to elm, cat, horse.  And mild (level 2) sensitivity to dog and Rhizopus.  She has a cat at home but has had this for many years.    No audiogram on file         Medications:     Current Outpatient Rx   Medication Sig Dispense Refill    aspirin 81 MG chewable tablet Take 1 tablet (81 mg) by mouth daily 90 tablet 3    blood glucose (CONTOUR NEXT TEST) test strip Use to test blood sugar 2 times daily 100 strip 11    blood glucose monitoring (NO BRAND SPECIFIED) meter device kit Use to test blood sugar 2 times daily or as directed. 1 kit 0    budesonide (PULMICORT) 0.5 MG/2ML neb solution Mix 240 ml Bairon Med Sinus rinse, add 0.5 mg budesonide vial, rinse 1/2 bottle in each nostril twice daily 60 mL 4    cefdinir (OMNICEF) 300 MG capsule Take 1 capsule (300 mg) by mouth 2 times daily 20 capsule 0    cetirizine (ZYRTEC) 10 MG tablet Take 10 mg by mouth daily      ibuprofen (ADVIL/MOTRIN) 800 MG tablet Take 1 tablet (800 mg) by mouth every 8 hours as needed for moderate pain 60 tablet 0    Microlet Lancets MISC Use to test blood glucose twice a day  100 each 11    predniSONE (DELTASONE) 10 MG tablet Take 3 tablets (30 mg) by mouth daily for 5 days 15 tablet 0    valsartan-hydrochlorothiazide (DIOVAN HCT) 80-12.5 MG tablet TAKE 1 TABLET BY MOUTH EVERY DAY 90 tablet 3    neomycin-polymyxin-hydrocortisone (CORTISPORIN) 3.5-46081-3 otic solution Place 3 drops into the right ear 4 times daily (Patient not taking: Reported on 8/29/2023) 10 mL 1            Allergies:     Allergies: Ace inhibitors, Penicillins, Pravachol [pravastatin], Sulfa antibiotics, Zocor [simvastatin], and Adhesive tape          Past Medical History:     Past Medical History:   Diagnosis Date    Abnormal glandular Papanicolaou smear of cervix 01/11/2002    also 11/05/2003    Dysmenorrhea 11/30/2006    Dysplasia of cervix, unspecified 04/25/2002    Habitual aborter, 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    Hypertension     Infections of genitourinary tract antepartum 05/27/2009    Metrorrhagia 12/06/2001    Migraine, unspecified, without mention of intractable migraine without mention of status migrainosus 02/23/2001    oral contraceptive related    Pain in limb 08/22/2002    Unspecified disorder of female genital organs 12/13/2006    Unspecified hemorrhage in early pregnancy, antepartum 12/04/2006            Past Surgical History:     Past Surgical History:   Procedure Laterality Date    BREAST SURGERY      CHOLECYSTECTOMY      COLONOSCOPY      ENDOSCOPY UPPER, COLONOSCOPY, COMBINED N/A 07/15/2022    Procedure: Upper Endoscopy and Colonoscopy with biopsy, with polypectomy;  Surgeon: Jorge Mcduffie MD;  Location: [facility]    GYN SURGERY      laparoscopic supracervical hysterectomy,left tube and ovary removal,fulguration of endometriosis 8/28/13--[facility] [place]    LAPAROSCOPIC HYSTERECTOMY SUPRACERVICAL, BILATERAL SALPINGO-OOPHORECTOMY, COMBINED  08/28/2013    Procedure: COMBINED LAPAROSCOPIC HYSTERECTOMY SUPRACERVICAL, SALPINGO-OOPHORECTOMY;  LAPAROSCOPIC SUPRACERVICAL  "HYSTERECTOMY/LAPAROSCOPIC LEFT SALPINGO-OOPHORECTOMY;  Surgeon: Jerrell Thornton MD;  Location: [facility]    LAPAROSCOPIC SALPINGO-OOPHORECTOMY Right 11/22/2017    Procedure: LAPAROSCOPIC SALPINGO-OOPHORECTOMY;  LAPAROSCOPIC RIGHT SALPINGO-OOPHORECTOMY excision of right pelvic side wall mass;  Surgeon: Jerrell Thornton MD;  Location: [facility]    LAPAROSCOPY DIAGNOSTIC (GENERAL)      MAMMOPLASTY REDUCTION      [facility]  2005    WISDOM TEETH         ENT family history reviewed         Social History:     Social History     Tobacco Use    Smoking status: Never    Smokeless tobacco: Never   Vaping Use    Vaping Use: Never used   Substance Use Topics    Alcohol use: No    Drug use: Never            Review of Systems:     ROS: See HPI         Physical Exam:     /76 (Cuff Size: Adult Large)   Pulse 80   Temp 97.5  F (36.4  C) (Tympanic)   Ht 1.753 m (5' 9\")   Wt 113.4 kg (250 lb)   LMP 08/18/2013   SpO2 97%   BMI 36.92 kg/m      General - The patient is well nourished and well developed, and appears to have good nutritional status.  Alert and oriented to person and place, answers questions and cooperates with examination appropriately.   Head and Face - Normocephalic and atraumatic, with no gross asymmetry noted.  The facial nerve is intact, with strong symmetric movements.  Voice and Breathing - The patient was breathing comfortably without the use of accessory muscles. There was no wheezing, stridor. The patients voice was clear and strong, slight nasal quality to the voice  Ears - External ear normal.  The ears were examined under binocular microscopy and with otoscope.  Bilateral canals are clear.  Bilateral tympanic membranes are intact without effusion or retraction.  Bony landmarks are intact.  Eyes - Extraocular movements intact, sclera were not icteric or injected.  Mouth - Examination of the oral cavity showed pink, healthy oral mucosa. Dentition in good condition. No lesions or ulcerations noted. The tongue " was mobile and midline.   Throat - The walls of the oropharynx were smooth, pink, moist, symmetric, and had no lesions or ulcerations.  The tonsillar pillars and soft palate were symmetric. The uvula was midline on elevation.    Neck - Normal range of motion, no palpable lymphadenopathy.  Palpation of the thyroid was soft and smooth, with no nodules or goiter appreciated.  The trachea was mobile and midline.  Nose - External contour is symmetric, no gross deflection or scars.  Nasal mucosa is pink and moist with no abnormal mucus.  The septum and turbinates were evaluated with nasal speculum, bilateral inferior turbinates are enlarged, pale and boggy.    To evaluate the nose and sinuses, I performed rigid nasal endoscopy.  I sprayed both nares with 2 sprays lidocaine and neosynephrine.     I began with the RIGHT side using a 0 degree rigid nasal endoscope, and then similarly examined the LEFT side     Findings:  Inferior turbinates: Enlarged, pale and boggy  Middle turbinate and middle meatus: Very limited view due to enlarged IT's  The superior meatus is examined and limited due to enlarged IT's  Unable to visualize the sphenoethmoidal recess due to generalized mucosal edema  Unable to visualize the nasopharynx due to edema   No jerald polyps or purulence noted on limited view.  The patient tolerated the procedure well            Assessment and Plan:       ICD-10-CM    1. Acute pansinusitis, recurrence not specified  J01.40 cefdinir (OMNICEF) 300 MG capsule     predniSONE (DELTASONE) 10 MG tablet     budesonide (PULMICORT) 0.5 MG/2ML neb solution        Budesonide instructions  Make the Bairon Med Saline Solution using 2 packages of salt and previously boiled or distilled water.  This will make 240 ml of saline solution.  Mix the entire vial of budesonide into the solution.   Irrigate your nose 2 times a day with the warm budesonide solution using 1 bottle between nostrils in the morning, and one bottle at night.      cefdinir (OMNICEF) 300 MG capsule, Take 1 capsule (300 mg) by mouth 2 times daily     predniSONE (DELTASONE) 10 MG tablet, Take 3 tablets (30 mg) by mouth daily for 5 days     Risks of oral steroid use were discussed and include psychiatric/mood changes, insomnia, stomach ulcers and potential GI bleeding, blood sugar elevation/worsening diabetes, hip/bone necrosis or bone demineralization.  Advised to monitor blood sugars while taking.  Follow up right away if they are persistently elevated.     Joyce Lawson NP-C  [facility]

## 2023-08-29 NOTE — PATIENT INSTRUCTIONS
Thank you for allowing Joyce Renny and our ENT team to participate in your care.  If your medications are too expensive, please give the nurse a call.  We can possibly change this medication.  If you have a scheduling or an appointment question please contact our Health Unit Coordinator at their direct line [phone].   ALL nursing questions or concerns can be directed to your ENT nurse at: [phone] - Sharlene     Budesonide instructions  Make the Bairon Med Saline Solution using 2 packages of salt and previously boiled or distilled water.  This will make 240 ml of saline solution.  Mix the entire vial of budesonide into the solution.   Irrigate your nose 2 times a day with the warm budesonide solution using 1 bottle between nostrils in the morning, and one bottle at night.     cefdinir (OMNICEF) 300 MG capsule, Take 1 capsule (300 mg) by mouth 2 times daily     predniSONE (DELTASONE) 10 MG tablet, Take 3 tablets (30 mg) by mouth daily for 5 days     Follow up in 2-3 weeks

## 2023-09-14 DIAGNOSIS — I10 ESSENTIAL HYPERTENSION WITH GOAL BLOOD PRESSURE LESS THAN 140/90: ICD-10-CM

## 2023-09-15 DIAGNOSIS — I10 ESSENTIAL HYPERTENSION WITH GOAL BLOOD PRESSURE LESS THAN 140/90: ICD-10-CM

## 2023-09-15 RX ORDER — VALSARTAN AND HYDROCHLOROTHIAZIDE 80; 12.5 MG/1; MG/1
1 TABLET, FILM COATED ORAL DAILY
Qty: 90 TABLET | Refills: 3 | Status: SHIPPED | OUTPATIENT
Start: 2023-09-15 | End: 2024-09-13

## 2023-09-15 RX ORDER — VALSARTAN AND HYDROCHLOROTHIAZIDE 80; 12.5 MG/1; MG/1
TABLET, FILM COATED ORAL
Qty: 90 TABLET | Refills: 1 | Status: SHIPPED | OUTPATIENT
Start: 2023-09-15 | End: 2023-09-15

## 2023-10-01 ENCOUNTER — HEALTH MAINTENANCE LETTER (OUTPATIENT)
Age: 53
End: 2023-10-01

## 2023-10-11 ENCOUNTER — TRANSFERRED RECORDS (OUTPATIENT)
Dept: HEALTH INFORMATION MANAGEMENT | Facility: CLINIC | Age: 53
End: 2023-10-11

## 2023-10-11 LAB — RETINOPATHY: NEGATIVE

## 2023-11-02 ENCOUNTER — OFFICE VISIT (OUTPATIENT)
Dept: OBGYN | Facility: OTHER | Age: 53
End: 2023-11-02
Attending: NURSE PRACTITIONER
Payer: COMMERCIAL

## 2023-11-02 VITALS
OXYGEN SATURATION: 97 % | HEIGHT: 69 IN | WEIGHT: 293 LBS | DIASTOLIC BLOOD PRESSURE: 70 MMHG | BODY MASS INDEX: 43.4 KG/M2 | HEART RATE: 82 BPM | SYSTOLIC BLOOD PRESSURE: 124 MMHG

## 2023-11-02 DIAGNOSIS — Z01.419 WELL WOMAN EXAM: Primary | ICD-10-CM

## 2023-11-02 DIAGNOSIS — Z12.31 ENCOUNTER FOR SCREENING MAMMOGRAM FOR BREAST CANCER: ICD-10-CM

## 2023-11-02 DIAGNOSIS — N89.8 VAGINAL DISCHARGE: ICD-10-CM

## 2023-11-02 LAB
CLUE CELLS: ABNORMAL
TRICHOMONAS, WET PREP: ABNORMAL
WBC'S/HIGH POWER FIELD, WET PREP: ABNORMAL
YEAST, WET PREP: ABNORMAL

## 2023-11-02 PROCEDURE — 87624 HPV HI-RISK TYP POOLED RSLT: CPT | Performed by: NURSE PRACTITIONER

## 2023-11-02 PROCEDURE — 87210 SMEAR WET MOUNT SALINE/INK: CPT | Performed by: NURSE PRACTITIONER

## 2023-11-02 PROCEDURE — 99396 PREV VISIT EST AGE 40-64: CPT | Performed by: NURSE PRACTITIONER

## 2023-11-02 PROCEDURE — G0123 SCREEN CERV/VAG THIN LAYER: HCPCS | Performed by: NURSE PRACTITIONER

## 2023-11-02 ASSESSMENT — ANXIETY QUESTIONNAIRES
2. NOT BEING ABLE TO STOP OR CONTROL WORRYING: SEVERAL DAYS
IF YOU CHECKED OFF ANY PROBLEMS ON THIS QUESTIONNAIRE, HOW DIFFICULT HAVE THESE PROBLEMS MADE IT FOR YOU TO DO YOUR WORK, TAKE CARE OF THINGS AT HOME, OR GET ALONG WITH OTHER PEOPLE: SOMEWHAT DIFFICULT
7. FEELING AFRAID AS IF SOMETHING AWFUL MIGHT HAPPEN: SEVERAL DAYS
5. BEING SO RESTLESS THAT IT IS HARD TO SIT STILL: NOT AT ALL
GAD7 TOTAL SCORE: 6
6. BECOMING EASILY ANNOYED OR IRRITABLE: SEVERAL DAYS
3. WORRYING TOO MUCH ABOUT DIFFERENT THINGS: SEVERAL DAYS
1. FEELING NERVOUS, ANXIOUS, OR ON EDGE: SEVERAL DAYS
GAD7 TOTAL SCORE: 6

## 2023-11-02 ASSESSMENT — PAIN SCALES - GENERAL: PAINLEVEL: NO PAIN (0)

## 2023-11-02 ASSESSMENT — PATIENT HEALTH QUESTIONNAIRE - PHQ9
SUM OF ALL RESPONSES TO PHQ QUESTIONS 1-9: 4
5. POOR APPETITE OR OVEREATING: SEVERAL DAYS

## 2023-11-02 NOTE — PROGRESS NOTES
CC:  Annual exam  HPI:  Paulette Flores is a 53 year old female P2, ages 24 and 15.  22 y/o son is getting  tomorrow and expecting baby in May.   recently had surgery for prostate CA and is recovering well.  Patient's last menstrual period was 08/18/2013.   S/P hysterectomy.  Denies vaginal bleeding or pain.  Some increase in vaginal discharge and occasional odor. Seeing PCP next week and will plan to update immunizations then.  Diet and exercise discussed as well as stress management.   No other c/o.      Past GYN history:  No STD history  STI testing offered?  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    Hypertension     Infections of genitourinary tract antepartum 05/27/2009    Metrorrhagia 12/06/2001    Migraine, unspecified, without mention of intractable migraine without mention of status migrainosus 02/23/2001    oral contraceptive related    Pain in limb 08/22/2002    Unspecified disorder of female genital organs 12/13/2006    Unspecified hemorrhage in early pregnancy, antepartum 12/04/2006       Past Surgical History:   Procedure Laterality Date    BREAST SURGERY      CHOLECYSTECTOMY      COLONOSCOPY      ENDOSCOPY UPPER, COLONOSCOPY, COMBINED N/A 07/15/2022    Procedure: Upper Endoscopy and Colonoscopy with biopsy, with polypectomy;  Surgeon: Jorge Mcduffie MD;  Location: [facility]    GYN SURGERY      laparoscopic supracervical hysterectomy,left tube and ovary removal,fulguration of endometriosis 8/28/13--[facility] [place]    LAPAROSCOPIC HYSTERECTOMY SUPRACERVICAL, BILATERAL SALPINGO-OOPHORECTOMY, COMBINED  08/28/2013    Procedure: COMBINED LAPAROSCOPIC HYSTERECTOMY SUPRACERVICAL,  SALPINGO-OOPHORECTOMY;  LAPAROSCOPIC SUPRACERVICAL HYSTERECTOMY/LAPAROSCOPIC LEFT SALPINGO-OOPHORECTOMY;  Surgeon: Jerrell Thornton MD;  Location: [facility]    LAPAROSCOPIC SALPINGO-OOPHORECTOMY Right 11/22/2017    Procedure: LAPAROSCOPIC SALPINGO-OOPHORECTOMY;  LAPAROSCOPIC RIGHT SALPINGO-OOPHORECTOMY excision of right pelvic side wall mass;  Surgeon: Jerrell Thornton MD;  Location: [facility]    LAPAROSCOPY DIAGNOSTIC (GENERAL)      MAMMOPLASTY REDUCTION      [facility]  2005    WISDOM TEETH         Family History   Problem Relation Age of Onset    Myocardial Infarction Paternal Grandfather 45        MI, cause of death    Cancer Maternal Aunt         Cervical    Hypertension Maternal Grandmother     Osteoporosis Maternal Grandmother     Myocardial Infarction Father         in 60's / smoker     Unknown/Adopted Father     Myocardial Infarction Mother         MI vs CVA - age 68yO    Cerebrovascular Disease Mother     Other Cancer Other         Cervical Cancer       Current Outpatient Medications   Medication Sig Dispense Refill    aspirin 81 MG chewable tablet Take 1 tablet (81 mg) by mouth daily 90 tablet 3    blood glucose (CONTOUR NEXT TEST) test strip Use to test blood sugar 2 times daily 100 strip 11    blood glucose monitoring (NO BRAND SPECIFIED) meter device kit Use to test blood sugar 2 times daily or as directed. 1 kit 0    budesonide (PULMICORT) 0.5 MG/2ML neb solution Mix 240 ml Bairon Med Sinus rinse, add 0.5 mg budesonide vial, rinse 1/2 bottle in each nostril twice daily 60 mL 4    cetirizine (ZYRTEC) 10 MG tablet Take 10 mg by mouth daily      ibuprofen (ADVIL/MOTRIN) 800 MG tablet Take 1 tablet (800 mg) by mouth every 8 hours as needed for moderate pain 60 tablet 0    Microlet Lancets MISC Use to test blood glucose twice a day 100 each 11    valsartan-hydrochlorothiazide (DIOVAN HCT) 80-12.5 MG tablet Take 1 tablet by mouth daily 90 tablet 3       Allergies: Ace inhibitors, Penicillins, Pravachol [pravastatin], Sulfa  "antibiotics, Zocor [simvastatin], and Adhesive tape    ROS:  CONSTITUTIONAL:NEGATIVE for fever, chills, change in weight  BREAST: NEGATIVE for masses, tenderness or discharge  : negative for and bleeding  ENDOCRINE: NEGATIVE for temperature intolerance, skin/hair changes  PSYCHIATRIC: POSITIVE foranxiety, fatigue, and stress related to family and work.    EXAM:  Blood pressure 124/70, pulse 82, height 1.753 m (5' 9\"), weight 133.4 kg (294 lb), last menstrual period 08/18/2013, SpO2 97%, not currently breastfeeding.   BMI= Body mass index is 43.42 kg/m .  General - pleasant female in no acute distress.  Breast - no nodularity, asymmetry or nipple discharge bilaterally.  Abdomen - soft, nontender, nondistended, no hepatosplenomegaly.  Pelvic - EG: normal adult female, BUS: within normal limits, Vagina: well rugated, no discharge, Cervix: no lesions or CMT  Rectovaginal - deferred.  Musculoskeletal - no gross deformities.  Neurological - normal strength, sensation, and mental status.      ASSESSMENT/PLAN:  (Z01.419) Well woman exam  (primary encounter diagnosis)  Comment:   Plan: A pap thin layer screen with  HPV - recommended        age 30 - 65 years, Wet prep            (N89.8) Vaginal discharge  Comment:   Plan: wet prep done    (Z12.31) Encounter for screening mammogram for breast cancer  Comment:   Plan: mammogram ordered.      Discussed exercise and healthy eating, including calcium intake.  She should return to the clinic in one year, or sooner if problems arise.    "

## 2023-11-07 ENCOUNTER — LAB (OUTPATIENT)
Dept: LAB | Facility: OTHER | Age: 53
End: 2023-11-07
Attending: FAMILY MEDICINE
Payer: COMMERCIAL

## 2023-11-07 ENCOUNTER — OFFICE VISIT (OUTPATIENT)
Dept: FAMILY MEDICINE | Facility: OTHER | Age: 53
End: 2023-11-07
Attending: FAMILY MEDICINE
Payer: COMMERCIAL

## 2023-11-07 VITALS
SYSTOLIC BLOOD PRESSURE: 120 MMHG | WEIGHT: 293 LBS | RESPIRATION RATE: 18 BRPM | BODY MASS INDEX: 43.4 KG/M2 | OXYGEN SATURATION: 97 % | DIASTOLIC BLOOD PRESSURE: 80 MMHG | HEIGHT: 69 IN | HEART RATE: 63 BPM | TEMPERATURE: 98.3 F

## 2023-11-07 DIAGNOSIS — Z82.49 FH: CAD (CORONARY ARTERY DISEASE): ICD-10-CM

## 2023-11-07 DIAGNOSIS — E11.9 TYPE 2 DIABETES MELLITUS WITHOUT COMPLICATION, WITHOUT LONG-TERM CURRENT USE OF INSULIN (H): ICD-10-CM

## 2023-11-07 DIAGNOSIS — I10 ESSENTIAL HYPERTENSION WITH GOAL BLOOD PRESSURE LESS THAN 140/90: ICD-10-CM

## 2023-11-07 DIAGNOSIS — E66.01 MORBID OBESITY (H): ICD-10-CM

## 2023-11-07 DIAGNOSIS — E78.2 MIXED HYPERLIPIDEMIA: ICD-10-CM

## 2023-11-07 DIAGNOSIS — E11.9 TYPE 2 DIABETES MELLITUS WITHOUT COMPLICATION, WITHOUT LONG-TERM CURRENT USE OF INSULIN (H): Primary | ICD-10-CM

## 2023-11-07 DIAGNOSIS — G47.33 OSA (OBSTRUCTIVE SLEEP APNEA): ICD-10-CM

## 2023-11-07 LAB
ANION GAP SERPL CALCULATED.3IONS-SCNC: 13 MMOL/L (ref 7–15)
BKR LAB AP GYN ADEQUACY: NORMAL
BKR LAB AP GYN INTERPRETATION: NORMAL
BKR LAB AP HPV REFLEX: NORMAL
BKR LAB AP PREVIOUS ABNORMAL: NORMAL
BUN SERPL-MCNC: 14.1 MG/DL (ref 6–20)
CALCIUM SERPL-MCNC: 9.5 MG/DL (ref 8.6–10)
CHLORIDE SERPL-SCNC: 101 MMOL/L (ref 98–107)
CHOLEST SERPL-MCNC: 253 MG/DL
CREAT SERPL-MCNC: 0.72 MG/DL (ref 0.51–0.95)
DEPRECATED HCO3 PLAS-SCNC: 25 MMOL/L (ref 22–29)
EGFRCR SERPLBLD CKD-EPI 2021: >90 ML/MIN/1.73M2
EST. AVERAGE GLUCOSE BLD GHB EST-MCNC: 157 MG/DL
GLUCOSE SERPL-MCNC: 157 MG/DL (ref 70–99)
HBA1C MFR BLD: 7.1 %
HDLC SERPL-MCNC: 40 MG/DL
LDLC SERPL CALC-MCNC: 174 MG/DL
NONHDLC SERPL-MCNC: 213 MG/DL
PATH REPORT.COMMENTS IMP SPEC: NORMAL
PATH REPORT.COMMENTS IMP SPEC: NORMAL
PATH REPORT.RELEVANT HX SPEC: NORMAL
POTASSIUM SERPL-SCNC: 4.2 MMOL/L (ref 3.4–5.3)
SODIUM SERPL-SCNC: 139 MMOL/L (ref 135–145)
TRIGL SERPL-MCNC: 195 MG/DL
TSH SERPL DL<=0.005 MIU/L-ACNC: 2.3 UIU/ML (ref 0.3–4.2)

## 2023-11-07 PROCEDURE — 80048 BASIC METABOLIC PNL TOTAL CA: CPT

## 2023-11-07 PROCEDURE — 91320 SARSCV2 VAC 30MCG TRS-SUC IM: CPT | Performed by: FAMILY MEDICINE

## 2023-11-07 PROCEDURE — 84443 ASSAY THYROID STIM HORMONE: CPT

## 2023-11-07 PROCEDURE — 80061 LIPID PANEL: CPT

## 2023-11-07 PROCEDURE — 36415 COLL VENOUS BLD VENIPUNCTURE: CPT

## 2023-11-07 PROCEDURE — 90480 ADMN SARSCOV2 VAC 1/ONLY CMP: CPT | Performed by: FAMILY MEDICINE

## 2023-11-07 PROCEDURE — 99214 OFFICE O/P EST MOD 30 MIN: CPT | Mod: 25 | Performed by: FAMILY MEDICINE

## 2023-11-07 PROCEDURE — 83036 HEMOGLOBIN GLYCOSYLATED A1C: CPT

## 2023-11-07 PROCEDURE — 90686 IIV4 VACC NO PRSV 0.5 ML IM: CPT | Performed by: FAMILY MEDICINE

## 2023-11-07 PROCEDURE — 90471 IMMUNIZATION ADMIN: CPT | Performed by: FAMILY MEDICINE

## 2023-11-07 RX ORDER — EZETIMIBE 10 MG/1
10 TABLET ORAL DAILY
Qty: 90 TABLET | Refills: 3 | Status: SHIPPED | OUTPATIENT
Start: 2023-11-07

## 2023-11-07 ASSESSMENT — PAIN SCALES - GENERAL: PAINLEVEL: NO PAIN (0)

## 2023-11-07 NOTE — PROGRESS NOTES
"  Assessment & Plan     Type 2 diabetes mellitus without complication, without long-term current use of insulin (H)  A1C up little - discussed.   Follow-up in 4 months. Had eye exam - will get NITHIN  - Basic metabolic panel; Future  - Hemoglobin A1c; Future  - TSH; Future  - Lipid Profile; Future  - ezetimibe (ZETIA) 10 MG tablet; Take 1 tablet (10 mg) by mouth daily    Essential hypertension with goal blood pressure less than 140/90  Stable - Continue current medications and behavioral changes.   - Basic metabolic panel; Future  - Hemoglobin A1c; Future  - TSH; Future  - Lipid Profile; Future    Mixed hyperlipidemia  Discussed-- not tolerate to statins. Add Zetia and pt to go back on red yeast- follow-up in 4 months   - Basic metabolic panel; Future  - Hemoglobin A1c; Future  - TSH; Future  - Lipid Profile; Future  - ezetimibe (ZETIA) 10 MG tablet; Take 1 tablet (10 mg) by mouth daily    Morbid obesity (H)  Stable but elevated still. Pt aware   - Basic metabolic panel; Future  - Hemoglobin A1c; Future  - TSH; Future  - Lipid Profile; Future    GIN (obstructive sleep apnea)  On cpap  - Basic metabolic panel; Future  - Hemoglobin A1c; Future  - TSH; Future  - Lipid Profile; Future    FH: CAD (coronary artery disease)  No s/s of angina   - Basic metabolic panel; Future  - Hemoglobin A1c; Future  - TSH; Future  - Lipid Profile; Future             BMI:   Estimated body mass index is 43.39 kg/m  as calculated from the following:    Height as of this encounter: 1.753 m (5' 9\").    Weight as of this encounter: 133.3 kg (293 lb 12.8 oz).         No follow-ups on file.    Manohar Villareal MD  [facility] - JAGDEEP Caldwell is a 53 year old, presenting for the following health issues:  Hypertension, Diabetes, and Lipids        11/7/2023     8:05 AM   Additional Questions   Roomed by JUANY Coleman   Accompanied by self`       HPI     Diabetes Follow-up    How often are you checking your blood sugar? A " few times a week  What time of day are you checking your blood sugars (select all that apply)?  After meals  Have you had any blood sugars above 200?  No  Have you had any blood sugars below 70?  No  What symptoms do you notice when your blood sugar is low?  None  What concerns do you have today about your diabetes? None   Do you have any of these symptoms? (Select all that apply)  No numbness or tingling in feet.  No redness, sores or blisters on feet.  No complaints of excessive thirst.  No reports of blurry vision.  No significant changes to weight.  Have you had a diabetic eye exam in the last 12 months? Yes- Date of last eye exam: 2 weeks ago            Hyperlipidemia Follow-Up    Are you regularly taking any medication or supplement to lower your cholesterol?   Yes- Valsartan-hydrochlorothiazide   Are you having muscle aches or other side effects that you think could be caused by your cholesterol lowering medication?  No    Hypertension Follow-up    Do you check your blood pressure regularly outside of the clinic? Yes   Are you following a low salt diet? Yes  Are your blood pressures ever more than 140 on the top number (systolic) OR more   than 90 on the bottom number (diastolic), for example 140/90? No    BP Readings from Last 2 Encounters:   11/07/23 120/80   11/02/23 124/70     Hemoglobin A1C (%)   Date Value   05/03/2023 6.9 (H)   02/03/2023 6.8 (H)     LDL Cholesterol Calculated (mg/dL)   Date Value   05/03/2023 136 (H)   02/03/2023 158 (H)   08/26/2020 83   03/25/2020 68         Vascular Disease Follow-up    How often do you take nitroglycerin? Never  Do you take an aspirin every day? No          Review of Systems   Constitutional, HEENT, cardiovascular, pulmonary, gi and gu systems are negative, except as otherwise noted.      Objective    /80 (BP Location: Right arm, Patient Position: Sitting, Cuff Size: Adult Large)   Pulse 63   Temp 98.3  F (36.8  C) (Tympanic)   Resp 18   Ht 1.753 m (5'  "9\")   Wt 133.3 kg (293 lb 12.8 oz)   LMP 08/18/2013   SpO2 97%   BMI 43.39 kg/m    Body mass index is 43.39 kg/m .  Physical Exam   GENERAL: healthy, alert and no distress  NECK: no adenopathy, no asymmetry, masses, or scars and thyroid normal to palpation  RESP: lungs clear to auscultation - no rales, rhonchi or wheezes  CV: regular rate and rhythm, normal S1 S2, no S3 or S4, no murmur, click or rub, no peripheral edema and peripheral pulses strong  MS: no gross musculoskeletal defects noted, no edema        Results for orders placed or performed in visit on 11/07/23   TSH     Status: Normal   Result Value Ref Range    TSH 2.30 0.30 - 4.20 uIU/mL   Hemoglobin A1c     Status: Abnormal   Result Value Ref Range    Estimated Average Glucose 157 mg/dL    Hemoglobin A1C 7.1 (H) <5.7 %   Basic metabolic panel     Status: Abnormal   Result Value Ref Range    Sodium 139 135 - 145 mmol/L    Potassium 4.2 3.4 - 5.3 mmol/L    Chloride 101 98 - 107 mmol/L    Carbon Dioxide (CO2) 25 22 - 29 mmol/L    Anion Gap 13 7 - 15 mmol/L    Urea Nitrogen 14.1 6.0 - 20.0 mg/dL    Creatinine 0.72 0.51 - 0.95 mg/dL    GFR Estimate >90 >60 mL/min/1.73m2    Calcium 9.5 8.6 - 10.0 mg/dL    Glucose 157 (H) 70 - 99 mg/dL   Lipid Profile     Status: Abnormal   Result Value Ref Range    Cholesterol 253 (H) <200 mg/dL    Triglycerides 195 (H) <150 mg/dL    Direct Measure HDL 40 (L) >=50 mg/dL    LDL Cholesterol Calculated 174 (H) <=100 mg/dL    Non HDL Cholesterol 213 (H) <130 mg/dL    Narrative    Cholesterol  Desirable:  <200 mg/dL    Triglycerides  Normal:  Less than 150 mg/dL  Borderline High:  150-199 mg/dL  High:  200-499 mg/dL  Very High:  Greater than or equal to 500 mg/dL    Direct Measure HDL  Female:  Greater than or equal to 50 mg/dL   Male:  Greater than or equal to 40 mg/dL    LDL Cholesterol  Desirable:  <100mg/dL  Above Desirable:  100-129 mg/dL   Borderline High:  130-159 mg/dL   High:  160-189 mg/dL   Very High:  >= 190 " mg/dL    Non HDL Cholesterol  Desirable:  130 mg/dL  Above Desirable:  130-159 mg/dL  Borderline High:  160-189 mg/dL  High:  190-219 mg/dL  Very High:  Greater than or equal to 220 mg/dL

## 2023-11-09 LAB
HUMAN PAPILLOMA VIRUS 16 DNA: NEGATIVE
HUMAN PAPILLOMA VIRUS 18 DNA: NEGATIVE
HUMAN PAPILLOMA VIRUS FINAL DIAGNOSIS: NORMAL
HUMAN PAPILLOMA VIRUS OTHER HR: NEGATIVE

## 2023-12-01 ENCOUNTER — OFFICE VISIT (OUTPATIENT)
Dept: FAMILY MEDICINE | Facility: OTHER | Age: 53
End: 2023-12-01
Attending: FAMILY MEDICINE
Payer: COMMERCIAL

## 2023-12-01 VITALS
SYSTOLIC BLOOD PRESSURE: 152 MMHG | HEART RATE: 75 BPM | TEMPERATURE: 97.9 F | BODY MASS INDEX: 43.4 KG/M2 | DIASTOLIC BLOOD PRESSURE: 95 MMHG | HEIGHT: 69 IN | OXYGEN SATURATION: 98 % | WEIGHT: 293 LBS

## 2023-12-01 DIAGNOSIS — J01.40 ACUTE PANSINUSITIS, RECURRENCE NOT SPECIFIED: ICD-10-CM

## 2023-12-01 PROCEDURE — 99213 OFFICE O/P EST LOW 20 MIN: CPT | Performed by: FAMILY MEDICINE

## 2023-12-01 RX ORDER — BUDESONIDE 0.5 MG/2ML
INHALANT ORAL
Qty: 60 ML | Refills: 4 | Status: CANCELLED | OUTPATIENT
Start: 2023-12-01

## 2023-12-01 RX ORDER — BUDESONIDE 0.5 MG/2ML
INHALANT ORAL
Qty: 60 ML | Refills: 0 | Status: SHIPPED | OUTPATIENT
Start: 2023-12-01

## 2023-12-01 RX ORDER — CEFDINIR 300 MG/1
300 CAPSULE ORAL 2 TIMES DAILY
Qty: 14 CAPSULE | Refills: 0 | Status: SHIPPED | OUTPATIENT
Start: 2023-12-01 | End: 2023-12-08

## 2023-12-01 ASSESSMENT — ENCOUNTER SYMPTOMS
FEVER: 0
SHORTNESS OF BREATH: 0

## 2023-12-01 ASSESSMENT — PAIN SCALES - GENERAL: PAINLEVEL: SEVERE PAIN (7)

## 2023-12-26 ENCOUNTER — ANCILLARY PROCEDURE (OUTPATIENT)
Dept: MAMMOGRAPHY | Facility: OTHER | Age: 53
End: 2023-12-26
Attending: NURSE PRACTITIONER
Payer: COMMERCIAL

## 2023-12-26 ENCOUNTER — TELEPHONE (OUTPATIENT)
Dept: MAMMOGRAPHY | Facility: OTHER | Age: 53
End: 2023-12-26

## 2023-12-26 DIAGNOSIS — Z12.31 ENCOUNTER FOR SCREENING MAMMOGRAM FOR BREAST CANCER: ICD-10-CM

## 2023-12-26 PROCEDURE — 77067 SCR MAMMO BI INCL CAD: CPT | Mod: TC | Performed by: RADIOLOGY

## 2023-12-26 PROCEDURE — 77063 BREAST TOMOSYNTHESIS BI: CPT | Mod: TC | Performed by: RADIOLOGY

## 2024-01-02 NOTE — TELEPHONE ENCOUNTER
"Call placed to patient to discuss positive covid 19 results.     Underlying Medical Conditions: DM, HTN, Obesity     Patient testing positive on 7/25/22 via home test kit .    Start of Symptoms: 7/24/22    Symptoms to include: fatigue, body ache, HA    Covid 19 Vaccination Status:  Fully vacc'd plus on booster    Appointment Scheduled:  Monday 7/25 @ 3:30 with Provider Xavier      Instructions for Patients  Your COVID-19 test was positive. This means you have the virus. Please follow the \"How can I take care of myself\" and \"How do I self-isolate?\" guidelines in these instructions.     What treatments are available?  Over-the-counter medicines may help with your symptoms such as runny or stuffy nose, cough, chills, and fever. Talk to your care team about your options.      Some people are at high risk for severe illness (for example if you have a weak immune system, you're 65 or older, or you have certain medical problems). If your risk it high and your symptoms started in the last 5 to 7 days, we strongly recommend for you to get COVID treatment as soon as possible before you get really sick. Paxlovid, Molnupiravir and the monoclonal antibody treatments are proven safe and effective, make you feel better faster, and prevent hospitalization and death.           What are the symptoms of COVID-19?  Symptoms can include fever, cough, shortness of breath, chills, headache, muscle pain sore throat, fatigue, runny or stuffy nose, and loss of taste and smell. Other less common symptoms include nausea, vomiting, or diarrhea (watery stools).     Know when to call 911. Emergency warning signs include:    Trouble breathing or shortness of breath    Pain or pressure in the chest that doesn't go away    Feeling confused like you haven't felt before, or not being able to wake up    Bluish-colored lips or face     How can I take care of myself?  1. Get lots of rest. Drink extra fluids (unless a doctor has told you not to).  2. Take " Take doxycycline twice daily for the cough aspect of this problem.  Use the albuterol as necessary both by inhaler and by nebulizer.  Take prednisone 1 pill daily for 7 days.  You can also use Robitussin DM over-the-counter 1 capful every 4-6 hours.  Follow-up with your family physician if further problems and if severe wheezing develops or chest pain go to the ER for further evaluation   Tylenol (acetaminophen) for fever or pain. If you have liver or kidney problems, ask your family doctor if it's okay to take Tylenol              Adults:              650 mg (two 325 mg pills or tablets) every 4 to 6 hours, or...              1,000 mg (two 500 mg pills or tablets) every 8 hours as needed.  Note: Don't take more than 3,000 mg in one day. Acetaminophen is found in many medicines (both prescribed and over the counter). Read all labels to be sure you don't take too much.  For children, check the Tylenol bottle for the right dose. The dose is based on the child's age or weight.  3. Take over the counter medicines for your symptoms as needed. Talk to your pharmacist.  4. If you have other health problems (like cancer, heart failure, an organ transplant, or severe kidney disease): Call your specialty clinic if you don't feel better in the next 2 days.     These guidelines are for isolating and quarantining before returning to work, school or .     For employers, schools and day cares: This is an official notice for this person's medical guidelines for returning in-person.     For health care sites: The CDC gives different isolation and quarantine guidelines for healthcare sites, please check with these sites before arriving.      How do I self-isolate?  You isolate when you have symptoms of COVID or a test shows you have COVID, even if you don't have symptoms.   1. If you DO have symptoms:  ? Stay home and away from others  ? For at least 5 days after your symptoms started, AND   ? You are fever free for 24 hours (with no medicine that reduces fever), AND  ? Your other symptoms are better.  ? Wear a mask for 10 full days any time you are around others.  2. If you DON'T have symptoms:  ? Stay at home and away from others for at least 5 days after your positive test.  ? Wear a mask for 10 full days any time you are around others.     How and when do I quarantine?  You quarantine when you may have been  exposed to the virus and DON'T have symptoms.   1. Stay home and away from others.   2. You must quarantine for 5 days after your last contact with a person who has COVID.  ? Note: If you are fully vaccinated, you don't need to quarantine. You should still follow the steps below.   3. Wear a mask for 10 full days any time you're around others.  4. Get tested at least 5 days after you were exposed, even if you don't have symptoms.   5. If you start to have symptoms, isolate right away and get tested.     Where can I get more information?    [facility] COVID-19 Resource Hub: www.[facility].org/covid19/     CDC Quarantine & Isolation: https://www.cdc.gov/coronavirus/2019-ncov/your-health/quarantine-isolation.html     [facility] - What to Do If You're Sick: https://www.cdc.gov/coronavirus/2019-ncov/if-you-are-sick/index.html    [facility] clinical trials (COVID-19 research studies): clinicalaffairs.[facility]/umn-clinical-trials    [place] Department of Health COVID-19 Public Hotline: 1-165.338.4505

## 2024-02-07 ENCOUNTER — OFFICE VISIT (OUTPATIENT)
Dept: FAMILY MEDICINE | Facility: OTHER | Age: 54
End: 2024-02-07
Attending: FAMILY MEDICINE
Payer: COMMERCIAL

## 2024-02-07 VITALS
WEIGHT: 293 LBS | SYSTOLIC BLOOD PRESSURE: 134 MMHG | BODY MASS INDEX: 43.4 KG/M2 | RESPIRATION RATE: 18 BRPM | OXYGEN SATURATION: 97 % | TEMPERATURE: 99.1 F | DIASTOLIC BLOOD PRESSURE: 80 MMHG | HEIGHT: 69 IN | HEART RATE: 76 BPM

## 2024-02-07 DIAGNOSIS — J01.41 ACUTE RECURRENT PANSINUSITIS: Primary | ICD-10-CM

## 2024-02-07 PROCEDURE — 99213 OFFICE O/P EST LOW 20 MIN: CPT | Performed by: FAMILY MEDICINE

## 2024-02-07 RX ORDER — CEFPROZIL 500 MG/1
500 TABLET, FILM COATED ORAL 2 TIMES DAILY
Qty: 20 TABLET | Refills: 0 | Status: SHIPPED | OUTPATIENT
Start: 2024-02-07 | End: 2024-03-14

## 2024-02-07 ASSESSMENT — PAIN SCALES - GENERAL: PAINLEVEL: SEVERE PAIN (7)

## 2024-02-07 NOTE — PROGRESS NOTES
"  Assessment & Plan     (J01.41) Acute recurrent pansinusitis  (primary encounter diagnosis)  Comment: Pt recently diagnosed with sinusitis on 12/1/23 and precribed budesonide solution and cefdinir which improved symptoms. Pt comes to clinic today with similar symptoms of head congestion, teeth discomfort, maxillary sinus tenderness, right ear discomfort that started 2/4/24 and progressively getting worse.   Plan: cefPROZIL (CEFZIL) 500 MG tablet BID for 10 days  - continue budesonide solution   - Tylenol for fever/pain   - Consider follow up with ENT if not improving or if gets another infection in next 2-3 months since this is her 3 this fall/winter                    BMI  Estimated body mass index is 43.68 kg/m  as calculated from the following:    Height as of this encounter: 1.753 m (5' 9\").    Weight as of this encounter: 134.2 kg (295 lb 12.8 oz).             No follow-ups on file.    Juancarlos Caldwell is a 53 year old, presenting for the following health issues:  URI        2/7/2024     3:48 PM   Additional Questions   Roomed by JUANY Coleman   Accompanied by self`     Paulette Flores is a 53 year old female coming to clinic today for possible sinus infection. Pt stated she started having stuffy nose, ear discomfort, and headache on Sunday 2/4/24 and has progressively gotten worse. Today pt endorses having headache, congestion, teeth pain, right ear discomfort, and sore throat. Pt denies any sick contacts. Pt stated she has recurrent sinus infections with her last one being on 12/1/23 for which she was precribed budesonide solution and cefdinir, and her symptoms improved. She did state she does need to get into ENT office. Pt has tried using a Nadia pot with budesonide which seems to help slightly. She states she is unable to blow her nose due to the increased congestion. Pt denies any fevers or chills, diarrhea, constipation, cough, shortness of breath, or chest discomfort.          Acute Illness  Acute illness " "concerns:   Onset/Duration: 3 days  Symptoms:  Fever: No  Chills/Sweats: No  Headache (location?): YES- temples  Sinus Pressure: YES  Conjunctivitis:  No  Ear Pain: YES: right  Rhinorrhea: No  Congestion: YES  Sore Throat: YES  Cough: no  Wheeze: No  Decreased Appetite: No  Nausea: No  Vomiting: No  Diarrhea: YES  Dysuria/Freq.: No  Dysuria or Hematuria: No  Fatigue/Achiness: YES  Sick/Strep Exposure: No  Therapies tried and outcome: None            Objective    /80 (BP Location: Left arm, Patient Position: Sitting, Cuff Size: Adult Large)   Pulse 76   Temp 99.1  F (37.3  C) (Tympanic)   Resp 18   Ht 1.753 m (5' 9\")   Wt 134.2 kg (295 lb 12.8 oz)   LMP 08/18/2013   SpO2 97%   BMI 43.68 kg/m    Body mass index is 43.68 kg/m .  Physical Exam  Constitutional:       General: She is not in acute distress.     Appearance: She is not ill-appearing.   HENT:      Head: Normocephalic.      Right Ear: Tympanic membrane normal.      Left Ear: Tympanic membrane normal.      Nose: Mucosal edema and congestion present.      Right Turbinates: Swollen.      Left Turbinates: Swollen.      Right Sinus: Maxillary sinus tenderness and frontal sinus tenderness present.      Left Sinus: Maxillary sinus tenderness and frontal sinus tenderness present.      Mouth/Throat:      Mouth: Mucous membranes are moist. No oral lesions.      Pharynx: Posterior oropharyngeal erythema present. No oropharyngeal exudate.      Tonsils: No tonsillar exudate.   Eyes:      Conjunctiva/sclera: Conjunctivae normal.   Cardiovascular:      Rate and Rhythm: Normal rate and regular rhythm.      Pulses: Normal pulses.      Heart sounds: No murmur heard.  Pulmonary:      Effort: Pulmonary effort is normal. No respiratory distress.      Breath sounds: No stridor. No wheezing.   Abdominal:      General: Bowel sounds are normal. There is no distension.      Palpations: Abdomen is soft.      Tenderness: There is no abdominal tenderness. There is no " guarding.   Musculoskeletal:      Cervical back: No tenderness.   Lymphadenopathy:      Cervical: No cervical adenopathy.   Skin:     General: Skin is warm and dry.   Neurological:      Mental Status: She is alert.              Jovon Flores MS4  Dr. Villareal seen, obtained history, and examined patient and agrees with medical students documentation, assessment, and plan.     Signed Electronically by: Manohar Villareal MD

## 2024-02-15 DIAGNOSIS — G47.33 OBSTRUCTIVE SLEEP APNEA: Primary | ICD-10-CM

## 2024-02-18 ENCOUNTER — HEALTH MAINTENANCE LETTER (OUTPATIENT)
Age: 54
End: 2024-02-18

## 2024-03-11 ENCOUNTER — TELEPHONE (OUTPATIENT)
Dept: FAMILY MEDICINE | Facility: OTHER | Age: 54
End: 2024-03-11

## 2024-03-11 NOTE — CONFIDENTIAL NOTE
"Writer [facility] for patient stating the following:  \" Dr. Villareal puts his labs in morning of and if she is due for lab the  will send her back.\"        "

## 2024-03-11 NOTE — TELEPHONE ENCOUNTER
9:08 AM    Reason for Call: Phone Call    Description: Patient reached out regarding appt scheduled for 3/14. She thought she needed to have labs scheduled prior, but I do not see any requests for labs. Please reach out to patient to clarify.     Was an appointment offered for this call? No  If yes : Appointment type              Date    Preferred method for responding to this message: Telephone Call  What is your phone number? [phone]    If we cannot reach you directly, may we leave a detailed response at the number you provided? Yes    Can this message wait until your PCP/provider returns, if available today? Elayne Love

## 2024-03-14 ENCOUNTER — OFFICE VISIT (OUTPATIENT)
Dept: FAMILY MEDICINE | Facility: OTHER | Age: 54
End: 2024-03-14
Attending: FAMILY MEDICINE
Payer: COMMERCIAL

## 2024-03-14 ENCOUNTER — LAB (OUTPATIENT)
Dept: LAB | Facility: OTHER | Age: 54
End: 2024-03-14
Attending: FAMILY MEDICINE
Payer: COMMERCIAL

## 2024-03-14 VITALS
OXYGEN SATURATION: 97 % | TEMPERATURE: 98.4 F | WEIGHT: 293 LBS | SYSTOLIC BLOOD PRESSURE: 132 MMHG | HEART RATE: 56 BPM | DIASTOLIC BLOOD PRESSURE: 88 MMHG | BODY MASS INDEX: 43.86 KG/M2

## 2024-03-14 DIAGNOSIS — K75.81 NASH (NONALCOHOLIC STEATOHEPATITIS): ICD-10-CM

## 2024-03-14 DIAGNOSIS — E11.9 TYPE 2 DIABETES MELLITUS WITHOUT COMPLICATION, WITHOUT LONG-TERM CURRENT USE OF INSULIN (H): Primary | ICD-10-CM

## 2024-03-14 DIAGNOSIS — E11.9 TYPE 2 DIABETES MELLITUS WITHOUT COMPLICATION, WITHOUT LONG-TERM CURRENT USE OF INSULIN (H): ICD-10-CM

## 2024-03-14 DIAGNOSIS — E78.2 MIXED HYPERLIPIDEMIA: ICD-10-CM

## 2024-03-14 DIAGNOSIS — I10 ESSENTIAL HYPERTENSION WITH GOAL BLOOD PRESSURE LESS THAN 140/90: ICD-10-CM

## 2024-03-14 DIAGNOSIS — E66.01 MORBID OBESITY (H): ICD-10-CM

## 2024-03-14 LAB
ALBUMIN SERPL BCG-MCNC: 4.3 G/DL (ref 3.5–5.2)
ALP SERPL-CCNC: 63 U/L (ref 40–150)
ALT SERPL W P-5'-P-CCNC: 111 U/L (ref 0–50)
ANION GAP SERPL CALCULATED.3IONS-SCNC: 10 MMOL/L (ref 7–15)
AST SERPL W P-5'-P-CCNC: 71 U/L (ref 0–45)
BASOPHILS # BLD AUTO: 0.1 10E3/UL (ref 0–0.2)
BASOPHILS NFR BLD AUTO: 1 %
BILIRUB SERPL-MCNC: 0.4 MG/DL
BUN SERPL-MCNC: 12.2 MG/DL (ref 6–20)
CALCIUM SERPL-MCNC: 9.3 MG/DL (ref 8.6–10)
CHLORIDE SERPL-SCNC: 101 MMOL/L (ref 98–107)
CHOLEST SERPL-MCNC: 223 MG/DL
CREAT SERPL-MCNC: 0.79 MG/DL (ref 0.51–0.95)
CREAT UR-MCNC: 50.8 MG/DL
DEPRECATED HCO3 PLAS-SCNC: 26 MMOL/L (ref 22–29)
EGFRCR SERPLBLD CKD-EPI 2021: 90 ML/MIN/1.73M2
EOSINOPHIL # BLD AUTO: 0.3 10E3/UL (ref 0–0.7)
EOSINOPHIL NFR BLD AUTO: 5 %
ERYTHROCYTE [DISTWIDTH] IN BLOOD BY AUTOMATED COUNT: 13.2 % (ref 10–15)
EST. AVERAGE GLUCOSE BLD GHB EST-MCNC: 177 MG/DL
FASTING STATUS PATIENT QL REPORTED: YES
GLUCOSE SERPL-MCNC: 146 MG/DL (ref 70–99)
HBA1C MFR BLD: 7.8 %
HCT VFR BLD AUTO: 41.6 % (ref 35–47)
HDLC SERPL-MCNC: 36 MG/DL
HGB BLD-MCNC: 13.7 G/DL (ref 11.7–15.7)
IMM GRANULOCYTES # BLD: 0 10E3/UL
IMM GRANULOCYTES NFR BLD: 0 %
LDLC SERPL CALC-MCNC: 147 MG/DL
LYMPHOCYTES # BLD AUTO: 1.6 10E3/UL (ref 0.8–5.3)
LYMPHOCYTES NFR BLD AUTO: 31 %
MCH RBC QN AUTO: 29 PG (ref 26.5–33)
MCHC RBC AUTO-ENTMCNC: 32.9 G/DL (ref 31.5–36.5)
MCV RBC AUTO: 88 FL (ref 78–100)
MICROALBUMIN UR-MCNC: <12 MG/L
MICROALBUMIN/CREAT UR: NORMAL MG/G{CREAT}
MONOCYTES # BLD AUTO: 0.5 10E3/UL (ref 0–1.3)
MONOCYTES NFR BLD AUTO: 10 %
NEUTROPHILS # BLD AUTO: 2.8 10E3/UL (ref 1.6–8.3)
NEUTROPHILS NFR BLD AUTO: 54 %
NONHDLC SERPL-MCNC: 187 MG/DL
NRBC # BLD AUTO: 0 10E3/UL
NRBC BLD AUTO-RTO: 0 /100
PLATELET # BLD AUTO: 177 10E3/UL (ref 150–450)
POTASSIUM SERPL-SCNC: 4.1 MMOL/L (ref 3.4–5.3)
PROT SERPL-MCNC: 7.1 G/DL (ref 6.4–8.3)
RBC # BLD AUTO: 4.72 10E6/UL (ref 3.8–5.2)
SODIUM SERPL-SCNC: 137 MMOL/L (ref 135–145)
TRIGL SERPL-MCNC: 200 MG/DL
TSH SERPL DL<=0.005 MIU/L-ACNC: 2.57 UIU/ML (ref 0.3–4.2)
WBC # BLD AUTO: 5.3 10E3/UL (ref 4–11)

## 2024-03-14 PROCEDURE — 83036 HEMOGLOBIN GLYCOSYLATED A1C: CPT

## 2024-03-14 PROCEDURE — 82043 UR ALBUMIN QUANTITATIVE: CPT

## 2024-03-14 PROCEDURE — 36415 COLL VENOUS BLD VENIPUNCTURE: CPT

## 2024-03-14 PROCEDURE — 80050 GENERAL HEALTH PANEL: CPT

## 2024-03-14 PROCEDURE — 99214 OFFICE O/P EST MOD 30 MIN: CPT | Performed by: FAMILY MEDICINE

## 2024-03-14 PROCEDURE — 82570 ASSAY OF URINE CREATININE: CPT

## 2024-03-14 PROCEDURE — 80061 LIPID PANEL: CPT

## 2024-03-14 ASSESSMENT — PAIN SCALES - GENERAL: PAINLEVEL: NO PAIN (0)

## 2024-03-14 NOTE — PROGRESS NOTES
"  Assessment & Plan     Type 2 diabetes mellitus without complication, without long-term current use of insulin (H)  Worse control.  Discussed. I think she would do well with this and below issues if start on GLP1 - R/B discussed. She agreed. Will order and set up for MTM consult. Follow-up in 3-4 months   - CBC with Platelets & Differential; Future  - Comprehensive metabolic panel; Future  - Lipid Profile; Future  - Hemoglobin A1c; Future  - Albumin Random Urine Quantitative with Creat Ratio; Future  - TSH; Future  - semaglutide (OZEMPIC) 2 MG/3ML pen; Inject 0.25 mg Subcutaneous every 7 days For  1 month then  0.5mg subcutaneous every 7 days  - Med Therapy Management Referral    Essential hypertension with goal blood pressure less than 140/90  Does check at home - better numbers - has some white coat htn here. No change in medss   - CBC with Platelets & Differential; Future  - Comprehensive metabolic panel; Future  - Lipid Profile; Future  - Hemoglobin A1c; Future  - Albumin Random Urine Quantitative with Creat Ratio; Future  - TSH; Future    Mixed hyperlipidemia  Can not take statin . Zetia helps some. Consider Tricor but wait and see what wt loss and GLP1 can do  - CBC with Platelets & Differential; Future  - Comprehensive metabolic panel; Future  - Lipid Profile; Future  - Hemoglobin A1c; Future  - Albumin Random Urine Quantitative with Creat Ratio; Future  - TSH; Future    Morbid obesity (H)  As above .  Add GLP1  - CBC with Platelets & Differential; Future  - Comprehensive metabolic panel; Future  - Lipid Profile; Future  - Hemoglobin A1c; Future  - Albumin Random Urine Quantitative with Creat Ratio; Future  - TSH; Future    ARROYO (nonalcoholic steatohepatitis)  As above. Add GLP1 to get wt down.            BMI  Estimated body mass index is 43.86 kg/m  as calculated from the following:    Height as of 2/7/24: 1.753 m (5' 9\").    Weight as of this encounter: 134.7 kg (297 lb).             No follow-ups on " file.    Juancarlos Caldwell is a 53 year old, presenting for the following health issues:  Hypertension, Lipids, and Diabetes        3/14/2024     8:27 AM   Additional Questions   Roomed by Delfina Saenz   Accompanied by None         3/14/2024     8:27 AM   Patient Reported Additional Medications   Patient reports taking the following new medications None     HPI     Diabetes Follow-up    How often are you checking your blood sugar? A few times a week  What time of day are you checking your blood sugars (select all that apply)?  Before and after meals  Have you had any blood sugars above 200?  No  Have you had any blood sugars below 70?  No  What symptoms do you notice when your blood sugar is low?  None  What concerns do you have today about your diabetes? None   Do you have any of these symptoms? (Select all that apply)  No numbness or tingling in feet.  No redness, sores or blisters on feet.  No complaints of excessive thirst.  No reports of blurry vision.  No significant changes to weight.          Hyperlipidemia Follow-Up    Are you regularly taking any medication or supplement to lower your cholesterol?   Yes- Zetia   Are you having muscle aches or other side effects that you think could be caused by your cholesterol lowering medication?  No    Hypertension Follow-up    Do you check your blood pressure regularly outside of the clinic? Occasionally    Are you following a low salt diet? Yes  Are your blood pressures ever more than 140 on the top number (systolic) OR more   than 90 on the bottom number (diastolic), for example 140/90? No    BP Readings from Last 2 Encounters:   03/14/24 (!) 151/100   02/07/24 134/80     Hemoglobin A1C (%)   Date Value   11/07/2023 7.1 (H)   05/03/2023 6.9 (H)     LDL Cholesterol Calculated (mg/dL)   Date Value   11/07/2023 174 (H)   05/03/2023 136 (H)   08/26/2020 83   03/25/2020 68             Review of Systems  Constitutional, neuro, ENT, endocrine, pulmonary, cardiac,  gastrointestinal, genitourinary, musculoskeletal, integument and psychiatric systems are negative, except as otherwise noted.      Objective    /88   Pulse 56   Temp 98.4  F (36.9  C) (Tympanic)   Wt 134.7 kg (297 lb)   LMP 08/18/2013   SpO2 97%   BMI 43.86 kg/m    Body mass index is 43.86 kg/m .  Physical Exam   GENERAL: alert and no distress  NECK: no adenopathy, no asymmetry, masses, or scars  RESP: lungs clear to auscultation - no rales, rhonchi or wheezes  CV: regular rate and rhythm, normal S1 S2, no S3 or S4, no murmur, click or rub, no peripheral edema  ABDOMEN: soft, nontender, no hepatosplenomegaly, no masses and bowel sounds normal  MS: no gross musculoskeletal defects noted, no edema    Results for orders placed or performed in visit on 03/14/24   Comprehensive metabolic panel     Status: Abnormal   Result Value Ref Range    Sodium 137 135 - 145 mmol/L    Potassium 4.1 3.4 - 5.3 mmol/L    Carbon Dioxide (CO2) 26 22 - 29 mmol/L    Anion Gap 10 7 - 15 mmol/L    Urea Nitrogen 12.2 6.0 - 20.0 mg/dL    Creatinine 0.79 0.51 - 0.95 mg/dL    GFR Estimate 90 >60 mL/min/1.73m2    Calcium 9.3 8.6 - 10.0 mg/dL    Chloride 101 98 - 107 mmol/L    Glucose 146 (H) 70 - 99 mg/dL    Alkaline Phosphatase 63 40 - 150 U/L    AST 71 (H) 0 - 45 U/L     (H) 0 - 50 U/L    Protein Total 7.1 6.4 - 8.3 g/dL    Albumin 4.3 3.5 - 5.2 g/dL    Bilirubin Total 0.4 <=1.2 mg/dL   Lipid Profile     Status: Abnormal   Result Value Ref Range    Cholesterol 223 (H) <200 mg/dL    Triglycerides 200 (H) <150 mg/dL    Direct Measure HDL 36 (L) >=50 mg/dL    LDL Cholesterol Calculated 147 (H) <=100 mg/dL    Non HDL Cholesterol 187 (H) <130 mg/dL    Patient Fasting > 8hrs? Yes     Narrative    Cholesterol  Desirable:  <200 mg/dL    Triglycerides  Normal:  Less than 150 mg/dL  Borderline High:  150-199 mg/dL  High:  200-499 mg/dL  Very High:  Greater than or equal to 500 mg/dL    Direct Measure HDL  Female:  Greater than or equal  to 50 mg/dL   Male:  Greater than or equal to 40 mg/dL    LDL Cholesterol  Desirable:  <100mg/dL  Above Desirable:  100-129 mg/dL   Borderline High:  130-159 mg/dL   High:  160-189 mg/dL   Very High:  >= 190 mg/dL    Non HDL Cholesterol  Desirable:  130 mg/dL  Above Desirable:  130-159 mg/dL  Borderline High:  160-189 mg/dL  High:  190-219 mg/dL  Very High:  Greater than or equal to 220 mg/dL   Hemoglobin A1c     Status: Abnormal   Result Value Ref Range    Estimated Average Glucose 177 mg/dL    Hemoglobin A1C 7.8 (H) <5.7 %   Albumin Random Urine Quantitative with Creat Ratio     Status: None   Result Value Ref Range    Creatinine Urine mg/dL 50.8 mg/dL    Albumin Urine mg/L <12.0 mg/L    Albumin Urine mg/g Cr     TSH     Status: Normal   Result Value Ref Range    TSH 2.57 0.30 - 4.20 uIU/mL   CBC with platelets and differential     Status: None   Result Value Ref Range    WBC Count 5.3 4.0 - 11.0 10e3/uL    RBC Count 4.72 3.80 - 5.20 10e6/uL    Hemoglobin 13.7 11.7 - 15.7 g/dL    Hematocrit 41.6 35.0 - 47.0 %    MCV 88 78 - 100 fL    MCH 29.0 26.5 - 33.0 pg    MCHC 32.9 31.5 - 36.5 g/dL    RDW 13.2 10.0 - 15.0 %    Platelet Count 177 150 - 450 10e3/uL    % Neutrophils 54 %    % Lymphocytes 31 %    % Monocytes 10 %    % Eosinophils 5 %    % Basophils 1 %    % Immature Granulocytes 0 %    NRBCs per 100 WBC 0 <1 /100    Absolute Neutrophils 2.8 1.6 - 8.3 10e3/uL    Absolute Lymphocytes 1.6 0.8 - 5.3 10e3/uL    Absolute Monocytes 0.5 0.0 - 1.3 10e3/uL    Absolute Eosinophils 0.3 0.0 - 0.7 10e3/uL    Absolute Basophils 0.1 0.0 - 0.2 10e3/uL    Absolute Immature Granulocytes 0.0 <=0.4 10e3/uL    Absolute NRBCs 0.0 10e3/uL   CBC with Platelets & Differential     Status: None    Narrative    The following orders were created for panel order CBC with Platelets & Differential.  Procedure                               Abnormality         Status                     ---------                               -----------          ------                     CBC with platelets and d...[[id number]]                      Final result                 Please view results for these tests on the individual orders.             Signed Electronically by: Manohar Villareal MD

## 2024-03-20 ENCOUNTER — TELEPHONE (OUTPATIENT)
Dept: FAMILY MEDICINE | Facility: OTHER | Age: 54
End: 2024-03-20

## 2024-04-17 DIAGNOSIS — E11.65 TYPE 2 DIABETES MELLITUS WITH HYPERGLYCEMIA, WITHOUT LONG-TERM CURRENT USE OF INSULIN (H): ICD-10-CM

## 2024-04-17 NOTE — TELEPHONE ENCOUNTER
Test Strips      Last Written Prescription Date:  5/19/22  Last Fill Quantity: 100,   # refills: 11  Last Office Visit: 3/14/24  Future Office visit:    Next 5 appointments (look out 90 days)      Jun 25, 2024  8:45 AM  (Arrive by 8:30 AM)  SHORT with Manohar Villareal MD  [facility] [place] ([facility] [place] ) [address]  [place] [address]  [phone]             Routing refill request to provider for review/approval because:

## 2024-06-25 ENCOUNTER — LAB (OUTPATIENT)
Dept: LAB | Facility: OTHER | Age: 54
End: 2024-06-25
Attending: FAMILY MEDICINE
Payer: COMMERCIAL

## 2024-06-25 ENCOUNTER — OFFICE VISIT (OUTPATIENT)
Dept: FAMILY MEDICINE | Facility: OTHER | Age: 54
End: 2024-06-25
Attending: FAMILY MEDICINE
Payer: COMMERCIAL

## 2024-06-25 VITALS
SYSTOLIC BLOOD PRESSURE: 126 MMHG | DIASTOLIC BLOOD PRESSURE: 70 MMHG | TEMPERATURE: 98.2 F | RESPIRATION RATE: 16 BRPM | WEIGHT: 271.7 LBS | BODY MASS INDEX: 40.24 KG/M2 | OXYGEN SATURATION: 96 % | HEART RATE: 75 BPM | HEIGHT: 69 IN

## 2024-06-25 DIAGNOSIS — E78.2 MIXED HYPERLIPIDEMIA: ICD-10-CM

## 2024-06-25 DIAGNOSIS — E66.01 MORBID OBESITY (H): ICD-10-CM

## 2024-06-25 DIAGNOSIS — E11.65 TYPE 2 DIABETES MELLITUS WITH HYPERGLYCEMIA, WITHOUT LONG-TERM CURRENT USE OF INSULIN (H): Primary | ICD-10-CM

## 2024-06-25 DIAGNOSIS — K75.81 NASH (NONALCOHOLIC STEATOHEPATITIS): ICD-10-CM

## 2024-06-25 DIAGNOSIS — I10 ESSENTIAL HYPERTENSION WITH GOAL BLOOD PRESSURE LESS THAN 140/90: ICD-10-CM

## 2024-06-25 DIAGNOSIS — E11.65 TYPE 2 DIABETES MELLITUS WITH HYPERGLYCEMIA, WITHOUT LONG-TERM CURRENT USE OF INSULIN (H): ICD-10-CM

## 2024-06-25 LAB
ALBUMIN SERPL BCG-MCNC: 4.3 G/DL (ref 3.5–5.2)
ALP SERPL-CCNC: 60 U/L (ref 40–150)
ALT SERPL W P-5'-P-CCNC: 82 U/L (ref 0–50)
ANION GAP SERPL CALCULATED.3IONS-SCNC: 13 MMOL/L (ref 7–15)
AST SERPL W P-5'-P-CCNC: 49 U/L (ref 0–45)
BILIRUB SERPL-MCNC: 0.5 MG/DL
BUN SERPL-MCNC: 16.3 MG/DL (ref 6–20)
CALCIUM SERPL-MCNC: 9.8 MG/DL (ref 8.6–10)
CHLORIDE SERPL-SCNC: 104 MMOL/L (ref 98–107)
CREAT SERPL-MCNC: 0.83 MG/DL (ref 0.51–0.95)
DEPRECATED HCO3 PLAS-SCNC: 22 MMOL/L (ref 22–29)
EGFRCR SERPLBLD CKD-EPI 2021: 84 ML/MIN/1.73M2
EST. AVERAGE GLUCOSE BLD GHB EST-MCNC: 134 MG/DL
GLUCOSE SERPL-MCNC: 120 MG/DL (ref 70–99)
HBA1C MFR BLD: 6.3 %
POTASSIUM SERPL-SCNC: 4.2 MMOL/L (ref 3.4–5.3)
PROT SERPL-MCNC: 7.1 G/DL (ref 6.4–8.3)
SODIUM SERPL-SCNC: 139 MMOL/L (ref 135–145)

## 2024-06-25 PROCEDURE — 99214 OFFICE O/P EST MOD 30 MIN: CPT | Performed by: FAMILY MEDICINE

## 2024-06-25 PROCEDURE — 80053 COMPREHEN METABOLIC PANEL: CPT

## 2024-06-25 PROCEDURE — 99207 PR FOOT EXAM NO CHARGE: CPT | Performed by: FAMILY MEDICINE

## 2024-06-25 PROCEDURE — 36415 COLL VENOUS BLD VENIPUNCTURE: CPT

## 2024-06-25 PROCEDURE — 83036 HEMOGLOBIN GLYCOSYLATED A1C: CPT

## 2024-06-25 PROCEDURE — G2211 COMPLEX E/M VISIT ADD ON: HCPCS | Performed by: FAMILY MEDICINE

## 2024-06-25 ASSESSMENT — PAIN SCALES - GENERAL: PAINLEVEL: NO PAIN (0)

## 2024-06-25 NOTE — PROGRESS NOTES
"  Assessment & Plan     Type 2 diabetes mellitus with hyperglycemia, without long-term current use of insulin (H)  MUCH better. Continue current medications and behavioral changes but will go up to 1mg of Ozemplic. Follow-up in 3-4 months  - Comprehensive metabolic panel; Future  - Hemoglobin A1c; Future  - FL FOOT EXAM NO CHARGE  - Semaglutide, 1 MG/DOSE, (OZEMPIC) 4 MG/3ML pen; Inject 1 mg Subcutaneous every 7 days    Essential hypertension with goal blood pressure less than 140/90  Stable - to goal. Continue current medications and behavioral changes. \  - Comprehensive metabolic panel; Future  - Hemoglobin A1c; Future    Mixed hyperlipidemia  CAN NOT take statin.   - Comprehensive metabolic panel; Future  - Hemoglobin A1c; Future    Morbid obesity (H)  Improved. Congrats. Keep working on diet and exercise   - Comprehensive metabolic panel; Future  - Hemoglobin A1c; Future    ARORYO (nonalcoholic steatohepatitis)  Improved some   - Comprehensive metabolic panel; Future  - Hemoglobin A1c; Future          BMI  Estimated body mass index is 40.12 kg/m  as calculated from the following:    Height as of this encounter: 1.753 m (5' 9\").    Weight as of this encounter: 123.2 kg (271 lb 11.2 oz).             No follow-ups on file.    Juancarlos Caldwell is a 53 year old, presenting for the following health issues:  Diabetes        6/25/2024     8:27 AM   Additional Questions   Roomed by JUANY Coleman   Accompanied by self`     HPI     Diabetes Follow-up    How often are you checking your blood sugar? One time daily  What time of day are you checking your blood sugars (select all that apply)?  Before and after meals and At bedtime  Have you had any blood sugars above 200?  No  Have you had any blood sugars below 70?  No  What symptoms do you notice when your blood sugar is low?  None  What concerns do you have today about your diabetes? None   Do you have any of these symptoms? (Select all that apply)  No numbness or tingling " "in feet.  No redness, sores or blisters on feet.  No complaints of excessive thirst.  No reports of blurry vision.  No significant changes to weight.    BS much better  Wt down 25lbs   Doing great with GLP1  NO issues        Hyperlipidemia Follow-Up    Are you regularly taking any medication or supplement to lower your cholesterol?   Yes- Zetia  Are you having muscle aches or other side effects that you think could be caused by your cholesterol lowering medication?  No    Hypertension Follow-up    Do you check your blood pressure regularly outside of the clinic? Yes   Are you following a low salt diet? Yes  Are your blood pressures ever more than 140 on the top number (systolic) OR more   than 90 on the bottom number (diastolic), for example 140/90? Yes    BP good and to goal  BP Readings from Last 2 Encounters:   06/25/24 126/70   03/14/24 132/88     Hemoglobin A1C (%)   Date Value   03/14/2024 7.8 (H)   11/07/2023 7.1 (H)     LDL Cholesterol Calculated (mg/dL)   Date Value   03/14/2024 147 (H)   11/07/2023 174 (H)   08/26/2020 83   03/25/2020 68             Review of Systems  Constitutional, HEENT, cardiovascular, pulmonary, gi and gu systems are negative, except as otherwise noted.      Objective    /70 (BP Location: Right arm, Patient Position: Sitting, Cuff Size: Adult Large)   Pulse 75   Temp 98.2  F (36.8  C) (Tympanic)   Resp 16   Ht 1.753 m (5' 9\")   Wt 123.2 kg (271 lb 11.2 oz)   LMP 08/18/2013   SpO2 96%   BMI 40.12 kg/m    Body mass index is 40.12 kg/m .  Physical Exam   GENERAL: alert and no distress  NECK: no adenopathy, no asymmetry, masses, or scars  RESP: lungs clear to auscultation - no rales, rhonchi or wheezes  CV: regular rate and rhythm, normal S1 S2, no S3 or S4, no murmur, click or rub, no peripheral edema  ABDOMEN: soft, nontender, no hepatosplenomegaly, no masses and bowel sounds normal  MS: no gross musculoskeletal defects noted, no edema  Diabetic foot exam: normal DP and PT " pulses, no trophic changes or ulcerative lesions, normal sensory exam, and normal monofilament exam    Results for orders placed or performed in visit on 06/25/24   Hemoglobin A1c     Status: Abnormal   Result Value Ref Range    Estimated Average Glucose 134 mg/dL    Hemoglobin A1C 6.3 (H) <5.7 %   Comprehensive metabolic panel     Status: Abnormal   Result Value Ref Range    Sodium 139 135 - 145 mmol/L    Potassium 4.2 3.4 - 5.3 mmol/L    Carbon Dioxide (CO2) 22 22 - 29 mmol/L    Anion Gap 13 7 - 15 mmol/L    Urea Nitrogen 16.3 6.0 - 20.0 mg/dL    Creatinine 0.83 0.51 - 0.95 mg/dL    GFR Estimate 84 >60 mL/min/1.73m2    Calcium 9.8 8.6 - 10.0 mg/dL    Chloride 104 98 - 107 mmol/L    Glucose 120 (H) 70 - 99 mg/dL    Alkaline Phosphatase 60 40 - 150 U/L    AST 49 (H) 0 - 45 U/L    ALT 82 (H) 0 - 50 U/L    Protein Total 7.1 6.4 - 8.3 g/dL    Albumin 4.3 3.5 - 5.2 g/dL    Bilirubin Total 0.5 <=1.2 mg/dL             Signed Electronically by: Manohar Villareal MD

## 2024-07-09 ENCOUNTER — OFFICE VISIT (OUTPATIENT)
Dept: CHIROPRACTIC MEDICINE | Facility: OTHER | Age: 54
End: 2024-07-09
Attending: CHIROPRACTOR
Payer: COMMERCIAL

## 2024-07-09 DIAGNOSIS — M99.02 SEGMENTAL AND SOMATIC DYSFUNCTION OF THORACIC REGION: ICD-10-CM

## 2024-07-09 DIAGNOSIS — M99.01 SEGMENTAL AND SOMATIC DYSFUNCTION OF CERVICAL REGION: ICD-10-CM

## 2024-07-09 DIAGNOSIS — M99.03 SEGMENTAL AND SOMATIC DYSFUNCTION OF LUMBAR REGION: Primary | ICD-10-CM

## 2024-07-09 DIAGNOSIS — M54.50 ACUTE BILATERAL LOW BACK PAIN WITHOUT SCIATICA: ICD-10-CM

## 2024-07-09 PROCEDURE — 98941 CHIROPRACT MANJ 3-4 REGIONS: CPT | Mod: AT | Performed by: CHIROPRACTOR

## 2024-07-15 NOTE — PROGRESS NOTES
Subjective Finding:    Chief compalint: Patient presents with:  Back Pain , Pain Scale: 3/10, Intensity: dull, Duration: 2 weeks, Radiating: bilateral buttock.    Date of injury:     Activities that the pain restricts:   Home/household/hobbies/social activities: Yes.  Work duties: No.  Sleep: No.  Makes symptoms better: rest.  Makes symptoms worse: walking.  Have you seen anyone else for the symptoms? No.  Work related: No.  Automobile related injury: No.    Objective and Assessment:    Posture Analysis:   High shoulder: .  Head tilt: .  High iliac crest: .  Head carriage: neutral.  Thoracic Kyphosis: neutral.  Lumbar Lordosis: forward.    Lumbar Range of Motion: extension decreased.  Cervical Range of Motion: extension decreased.  Thoracic Range of Motion: .  Extremity Range of Motion: .    Palpation:   Psoas: right, referred pain: no    Segmental dysfunction pre-treatment and treatment area: C5, T4, L5, and Sacrum.    Assessment post-treatment:  Cervical: ROM increased.  Thoracic: ROM increased.  Lumbar: ROM increased.    Comments: .      Complicating Factors: .    Procedure(s):  CMT:  87827 Chiropractic manipulative treatment 3-4 regions performed   Cervical: Diversified, See above for level, Supine, Thoracic: Diversified, See above for level, Prone, and Lumbar: Diversified, See above for level, Side posture    Modalities:  None performed this visit    Therapeutic procedures:  None    Plan:  Treatment plan: PRN.  Instructed patient: stretch as instructed at visit.  Short term goals: increase ROM.  Long term goals: increase ADL.  Prognosis: excellent.

## 2024-08-12 ENCOUNTER — TELEPHONE (OUTPATIENT)
Dept: FAMILY MEDICINE | Facility: OTHER | Age: 54
End: 2024-08-12

## 2024-08-12 ENCOUNTER — OFFICE VISIT (OUTPATIENT)
Dept: FAMILY MEDICINE | Facility: OTHER | Age: 54
End: 2024-08-12
Attending: FAMILY MEDICINE
Payer: COMMERCIAL

## 2024-08-12 VITALS
BODY MASS INDEX: 39.25 KG/M2 | OXYGEN SATURATION: 98 % | TEMPERATURE: 97.1 F | SYSTOLIC BLOOD PRESSURE: 124 MMHG | RESPIRATION RATE: 18 BRPM | DIASTOLIC BLOOD PRESSURE: 78 MMHG | HEART RATE: 66 BPM | HEIGHT: 69 IN | WEIGHT: 265 LBS

## 2024-08-12 DIAGNOSIS — H60.393 INFECTIVE OTITIS EXTERNA, BILATERAL: Primary | ICD-10-CM

## 2024-08-12 PROCEDURE — 99213 OFFICE O/P EST LOW 20 MIN: CPT | Performed by: FAMILY MEDICINE

## 2024-08-12 RX ORDER — NEOMYCIN SULFATE, POLYMYXIN B SULFATE AND HYDROCORTISONE 10; 3.5; 1 MG/ML; MG/ML; [USP'U]/ML
3 SUSPENSION/ DROPS AURICULAR (OTIC) 4 TIMES DAILY
Qty: 10 ML | Refills: 2 | Status: SHIPPED | OUTPATIENT
Start: 2024-08-12

## 2024-08-12 ASSESSMENT — PAIN SCALES - GENERAL: PAINLEVEL: SEVERE PAIN (6)

## 2024-08-12 NOTE — PROGRESS NOTES
"  Assessment & Plan     Infective otitis externa, bilateral    Discussed and will treat.   Symptomatic treatment was discussed along when patient should call and/or come back into the clinic or go to ER/Urgent care. All questions answered.   - neomycin-polymyxin-hydrocortisone (CORTISPORIN) 3.5-99308-0 otic suspension; Place 3 drops into both ears 4 times daily          BMI  Estimated body mass index is 39.13 kg/m  as calculated from the following:    Height as of this encounter: 1.753 m (5' 9\").    Weight as of this encounter: 120.2 kg (265 lb).             No follow-ups on file.    Juancarlos Caldwell is a 53 year old, presenting for the following health issues:  Ear Problem        8/12/2024     2:12 PM   Additional Questions   Roomed by April   Accompanied by self         8/12/2024     2:12 PM   Patient Reported Additional Medications   Patient reports taking the following new medications none     History of Present Illness       Reason for visit:  Ear infection    She eats 2-3 servings of fruits and vegetables daily.She consumes 2 sweetened beverage(s) daily.She exercises with enough effort to increase her heart rate 9 or less minutes per day.  She exercises with enough effort to increase her heart rate 3 or less days per week.   She is taking medications regularly.       Ear Problem  Onset/Duration: 4 days  Description:   Location: Left  Pain: YES  Redness: YES  Feels plugged: YES  Ringing in ears (Tinnitus): YES  Hearing Loss: No  Accompanying Signs & Symptoms:  Discharge: No  Swelling: YES  Fever: No  Unsteady/off balance: No  Dizziness:  No  Sore throat: YES  Sinus Pressure: No  Headache: YES  Nasal Congestion: No  Progression of Symptoms: same  History:  Trauma: No  Previous similar symptoms: YES  Precipitating or alleviating factors: None  Therapies tried and outcome: None    No cold or sinus sx  No swimming           Review of Systems  Constitutional, HEENT, cardiovascular, pulmonary, gi and gu systems are " "negative, except as otherwise noted.      Objective    /78 (BP Location: Right arm, Patient Position: Sitting, Cuff Size: Adult Regular)   Pulse 66   Temp 97.1  F (36.2  C) (Tympanic)   Resp 18   Ht 1.753 m (5' 9\")   Wt 120.2 kg (265 lb)   LMP 08/18/2013   SpO2 98%   BMI 39.13 kg/m    Body mass index is 39.13 kg/m .  Physical Exam   GENERAL: alert and no distress  EYES: Eyes grossly normal to inspection, PERRL and conjunctivae and sclerae normal  HENT: ear canals and TM's normal, ear canals tender and with movement gives her pain.   Some seborrheic changes in right side   nose and mouth without ulcers or lesions  NECK: no adenopathy, no asymmetry, masses, or scars  RESP: lungs clear to auscultation - no rales, rhonchi or wheezes              Signed Electronically by: Manohar Villareal MD    "

## 2024-08-12 NOTE — TELEPHONE ENCOUNTER
8:09 AM    Reason for Call: OVERBOOK    Patient is having the following symptoms: ear infection for 3 days.    The patient is requesting an appointment for exam with Mono.    Was an appointment offered for this call? Yes  If yes : Appointment type: Office visit              Date: 09/25 was too far out    Preferred method for responding to this message: Telephone Call  What is your phone number ?  [phone]     If we cannot reach you directly, may we leave a detailed response at the number you provided? Yes    Can this message wait until your PCP/provider returns, if unavailable today? Not applicable    Quiana Montalvo

## 2024-09-06 ENCOUNTER — MYC MEDICAL ADVICE (OUTPATIENT)
Dept: FAMILY MEDICINE | Facility: OTHER | Age: 54
End: 2024-09-06

## 2024-09-13 ENCOUNTER — MYC REFILL (OUTPATIENT)
Dept: FAMILY MEDICINE | Facility: OTHER | Age: 54
End: 2024-09-13

## 2024-09-13 DIAGNOSIS — E11.65 TYPE 2 DIABETES MELLITUS WITH HYPERGLYCEMIA, WITHOUT LONG-TERM CURRENT USE OF INSULIN (H): ICD-10-CM

## 2024-09-13 DIAGNOSIS — I10 ESSENTIAL HYPERTENSION WITH GOAL BLOOD PRESSURE LESS THAN 140/90: ICD-10-CM

## 2024-09-13 NOTE — TELEPHONE ENCOUNTER
Blood glucose monitoring (No Brand Specified) meter device kit  Use to test blood sugar 2 times daily or as directed  Last Written Prescription Date:  5-19-22  Last Fill Quantity: 1 kit,   # refills: 0  Last Office Visit: 8-12-24  Future Office visit:    Next 5 appointments (look out 90 days)      Sep 25, 2024 7:45 AM  (Arrive by 7:30 AM)  Provider Visit with Manohar Villareal MD  [facility] [place] ([facility] [place] ) [address]  [place] [address]  [phone]

## 2024-09-17 RX ORDER — VALSARTAN AND HYDROCHLOROTHIAZIDE 80; 12.5 MG/1; MG/1
1 TABLET, FILM COATED ORAL DAILY
Qty: 90 TABLET | Refills: 0 | OUTPATIENT
Start: 2024-09-17

## 2024-09-17 NOTE — TELEPHONE ENCOUNTER
Protocol failed due to the following:    Diabetic Supplies Protocol [id number]/13/2024 10:55 AM   Protocol Details Recent (12 month) or future (90 days) visit with authorizing provider s specialty         Last office visit with PCP 08/12/24

## 2024-09-25 ENCOUNTER — TELEPHONE (OUTPATIENT)
Dept: FAMILY MEDICINE | Facility: OTHER | Age: 54
End: 2024-09-25

## 2024-09-25 ENCOUNTER — LAB (OUTPATIENT)
Dept: LAB | Facility: OTHER | Age: 54
End: 2024-09-25
Attending: FAMILY MEDICINE
Payer: COMMERCIAL

## 2024-09-25 ENCOUNTER — OFFICE VISIT (OUTPATIENT)
Dept: FAMILY MEDICINE | Facility: OTHER | Age: 54
End: 2024-09-25
Attending: FAMILY MEDICINE
Payer: COMMERCIAL

## 2024-09-25 VITALS
WEIGHT: 264 LBS | DIASTOLIC BLOOD PRESSURE: 86 MMHG | TEMPERATURE: 97.1 F | BODY MASS INDEX: 39.1 KG/M2 | OXYGEN SATURATION: 96 % | HEART RATE: 81 BPM | HEIGHT: 69 IN | SYSTOLIC BLOOD PRESSURE: 132 MMHG | RESPIRATION RATE: 18 BRPM

## 2024-09-25 DIAGNOSIS — E11.65 TYPE 2 DIABETES MELLITUS WITH HYPERGLYCEMIA, WITHOUT LONG-TERM CURRENT USE OF INSULIN (H): ICD-10-CM

## 2024-09-25 DIAGNOSIS — K75.81 NASH (NONALCOHOLIC STEATOHEPATITIS): ICD-10-CM

## 2024-09-25 DIAGNOSIS — I10 ESSENTIAL HYPERTENSION WITH GOAL BLOOD PRESSURE LESS THAN 140/90: ICD-10-CM

## 2024-09-25 DIAGNOSIS — E66.01 MORBID OBESITY (H): ICD-10-CM

## 2024-09-25 DIAGNOSIS — E11.65 TYPE 2 DIABETES MELLITUS WITH HYPERGLYCEMIA, WITHOUT LONG-TERM CURRENT USE OF INSULIN (H): Primary | ICD-10-CM

## 2024-09-25 LAB
ALBUMIN SERPL BCG-MCNC: 4.3 G/DL (ref 3.5–5.2)
ALP SERPL-CCNC: 64 U/L (ref 40–150)
ALT SERPL W P-5'-P-CCNC: 49 U/L (ref 0–50)
ANION GAP SERPL CALCULATED.3IONS-SCNC: 11 MMOL/L (ref 7–15)
AST SERPL W P-5'-P-CCNC: 31 U/L (ref 0–45)
BILIRUB SERPL-MCNC: 0.4 MG/DL
BUN SERPL-MCNC: 10.4 MG/DL (ref 6–20)
CALCIUM SERPL-MCNC: 9.3 MG/DL (ref 8.8–10.4)
CHLORIDE SERPL-SCNC: 105 MMOL/L (ref 98–107)
CREAT SERPL-MCNC: 0.79 MG/DL (ref 0.51–0.95)
EGFRCR SERPLBLD CKD-EPI 2021: 90 ML/MIN/1.73M2
EST. AVERAGE GLUCOSE BLD GHB EST-MCNC: 123 MG/DL
GLUCOSE SERPL-MCNC: 84 MG/DL (ref 70–99)
HBA1C MFR BLD: 5.9 %
HCO3 SERPL-SCNC: 24 MMOL/L (ref 22–29)
POTASSIUM SERPL-SCNC: 3.8 MMOL/L (ref 3.4–5.3)
PROT SERPL-MCNC: 7.1 G/DL (ref 6.4–8.3)
SODIUM SERPL-SCNC: 140 MMOL/L (ref 135–145)

## 2024-09-25 PROCEDURE — 80053 COMPREHEN METABOLIC PANEL: CPT

## 2024-09-25 PROCEDURE — G2211 COMPLEX E/M VISIT ADD ON: HCPCS | Performed by: FAMILY MEDICINE

## 2024-09-25 PROCEDURE — 36415 COLL VENOUS BLD VENIPUNCTURE: CPT

## 2024-09-25 PROCEDURE — 83036 HEMOGLOBIN GLYCOSYLATED A1C: CPT

## 2024-09-25 PROCEDURE — 99213 OFFICE O/P EST LOW 20 MIN: CPT | Performed by: FAMILY MEDICINE

## 2024-09-25 ASSESSMENT — PAIN SCALES - GENERAL: PAINLEVEL: NO PAIN (0)

## 2024-09-25 NOTE — TELEPHONE ENCOUNTER
NITHIN signed by patient to get eye exam records from [facility] for Dr. Villareal faxed to [phone].

## 2024-09-25 NOTE — PROGRESS NOTES
"  Assessment & Plan     Type 2 diabetes mellitus with hyperglycemia, without long-term current use of insulin (H)  GREAT control.  Seen Eye doc recently - will get NITHIN.   Discussed going up on GLP for wt loss - see below.   DECLINED all shots due. Follow-up in 4 motnsh   - Hemoglobin A1c; Future  - Comprehensive metabolic panel; Future    Essential hypertension with goal blood pressure less than 140/90  Stable - good control   - Hemoglobin A1c; Future  - Comprehensive metabolic panel; Future    Morbid obesity (H)  Discussed. Recommend going up on GLP due to wt loss stalling out. She declined. Pt to work on better behaviors   - Hemoglobin A1c; Future  - Comprehensive metabolic panel; Future    ARROYO (nonalcoholic steatohepatitis)  Much improved. LFT back to normal   - Hemoglobin A1c; Future  - Comprehensive metabolic panel; Future          BMI  Estimated body mass index is 38.99 kg/m  as calculated from the following:    Height as of this encounter: 1.753 m (5' 9\").    Weight as of this encounter: 119.7 kg (264 lb).           No follow-ups on file.    Juancarlos Caldwell is a 53 year old, presenting for the following health issues:  Hypertension, Diabetes, and Weight Problem        9/25/2024     7:44 AM   Additional Questions   Roomed by April   Accompanied by self         9/25/2024     7:44 AM   Patient Reported Additional Medications   Patient reports taking the following new medications none     History of Present Illness       Hypertension: She presents for follow up of hypertension.  She does check blood pressure  regularly outside of the clinic. Outpatient blood pressures have not been over 140/90. She follows a low salt diet.     She eats 2-3 servings of fruits and vegetables daily.She consumes 0 sweetened beverage(s) daily.She exercises with enough effort to increase her heart rate 30 to 60 minutes per day.  She exercises with enough effort to increase her heart rate 7 days per week. She is missing 7 dose(s) of " "medications per week.       Diabetes Follow-up    How often are you checking your blood sugar? A few times a week  What time of day are you checking your blood sugars (select all that apply)?  Before and after meals  Have you had any blood sugars above 200?  No  Have you had any blood sugars below 70?  No  What symptoms do you notice when your blood sugar is low?  Shaky, Dizzy, and Weak  What concerns do you have today about your diabetes? None   Do you have any of these symptoms? (Select all that apply)  No numbness or tingling in feet.  No redness, sores or blisters on feet.  No complaints of excessive thirst.  No reports of blurry vision.  No significant changes to weight.  Have you had a diabetic eye exam in the last 12 months? No        BP Readings from Last 2 Encounters:   09/25/24 132/86   08/12/24 124/78     Hemoglobin A1C (%)   Date Value   06/25/2024 6.3 (H)   03/14/2024 7.8 (H)     LDL Cholesterol Calculated (mg/dL)   Date Value   03/14/2024 147 (H)   11/07/2023 174 (H)   08/26/2020 83   03/25/2020 68             Hypertension Follow-up    Do you check your blood pressure regularly outside of the clinic? Yes   Are you following a low salt diet? Yes  Are your blood pressures ever more than 140 on the top number (systolic) OR more   than 90 on the bottom number (diastolic), for example 140/90? No  Concern - obesity   Onset: 10+years  Description: overweight   Intensity: moderate  Progression of Symptoms:  improving  Accompanying Signs & Symptoms: constipation   Therapies tried and outcome: ozempic       Review of Systems  Constitutional, neuro, ENT, endocrine, pulmonary, cardiac, gastrointestinal, genitourinary, musculoskeletal, integument and psychiatric systems are negative, except as otherwise noted.      Objective    /86 (BP Location: Right arm, Patient Position: Sitting, Cuff Size: Adult Regular)   Pulse 81   Temp 97.1  F (36.2  C) (Tympanic)   Resp 18   Ht 1.753 m (5' 9\")   Wt 119.7 kg (264 " lb)   LMP 08/18/2013   SpO2 96%   BMI 38.99 kg/m    Body mass index is 38.99 kg/m .  Physical Exam   GENERAL: alert and no distress  NECK: no adenopathy, no asymmetry, masses, or scars  RESP: lungs clear to auscultation - no rales, rhonchi or wheezes  CV: regular rate and rhythm, normal S1 S2, no S3 or S4, no murmur, click or rub, no peripheral edema  MS: no gross musculoskeletal defects noted, no edema    Results for orders placed or performed in visit on 09/25/24   Comprehensive metabolic panel     Status: Normal   Result Value Ref Range    Sodium 140 135 - 145 mmol/L    Potassium 3.8 3.4 - 5.3 mmol/L    Carbon Dioxide (CO2) 24 22 - 29 mmol/L    Anion Gap 11 7 - 15 mmol/L    Urea Nitrogen 10.4 6.0 - 20.0 mg/dL    Creatinine 0.79 0.51 - 0.95 mg/dL    GFR Estimate 90 >60 mL/min/1.73m2    Calcium 9.3 8.8 - 10.4 mg/dL    Chloride 105 98 - 107 mmol/L    Glucose 84 70 - 99 mg/dL    Alkaline Phosphatase 64 40 - 150 U/L    AST 31 0 - 45 U/L    ALT 49 0 - 50 U/L    Protein Total 7.1 6.4 - 8.3 g/dL    Albumin 4.3 3.5 - 5.2 g/dL    Bilirubin Total 0.4 <=1.2 mg/dL   Hemoglobin A1c     Status: Abnormal   Result Value Ref Range    Estimated Average Glucose 123 (H) <117 mg/dL    Hemoglobin A1C 5.9 (H) <5.7 %             Signed Electronically by: Manohar Villareal MD

## 2024-10-09 DIAGNOSIS — E11.65 TYPE 2 DIABETES MELLITUS WITH HYPERGLYCEMIA, WITHOUT LONG-TERM CURRENT USE OF INSULIN (H): ICD-10-CM

## 2024-10-09 RX ORDER — SEMAGLUTIDE 1.34 MG/ML
INJECTION, SOLUTION SUBCUTANEOUS
Qty: 3 ML | Refills: 3 | Status: SHIPPED | OUTPATIENT
Start: 2024-10-09

## 2024-11-19 ENCOUNTER — MYC REFILL (OUTPATIENT)
Dept: FAMILY MEDICINE | Facility: OTHER | Age: 54
End: 2024-11-19

## 2024-11-19 DIAGNOSIS — E78.2 MIXED HYPERLIPIDEMIA: ICD-10-CM

## 2024-11-19 DIAGNOSIS — E11.9 TYPE 2 DIABETES MELLITUS WITHOUT COMPLICATION, WITHOUT LONG-TERM CURRENT USE OF INSULIN (H): ICD-10-CM

## 2024-11-19 NOTE — TELEPHONE ENCOUNTER
Zetia      Last Written Prescription Date:  11/7/23  Last Fill Quantity: 90,   # refills: 3  Last Office Visit: 9/25/24  Future Office visit:    Next 5 appointments (look out 90 days)      Jan 27, 2025 8:15 AM  (Arrive by 8:00 AM)  Provider Visit with Manohar Villareal MD  [facility] [place] ([facility] [place] ) [address]  [place] [address]  [phone]             Routing refill request to provider for review/approval because:

## 2024-11-20 RX ORDER — EZETIMIBE 10 MG/1
10 TABLET ORAL DAILY
Qty: 90 TABLET | Refills: 3 | Status: SHIPPED | OUTPATIENT
Start: 2024-11-20

## 2024-11-20 RX ORDER — EZETIMIBE 10 MG/1
10 TABLET ORAL DAILY
Qty: 90 TABLET | Refills: 0 | Status: SHIPPED | OUTPATIENT
Start: 2024-11-20

## 2024-12-17 ENCOUNTER — OFFICE VISIT (OUTPATIENT)
Dept: CHIROPRACTIC MEDICINE | Facility: OTHER | Age: 54
End: 2024-12-17
Attending: CHIROPRACTOR
Payer: COMMERCIAL

## 2024-12-17 DIAGNOSIS — M99.02 SEGMENTAL AND SOMATIC DYSFUNCTION OF THORACIC REGION: ICD-10-CM

## 2024-12-17 DIAGNOSIS — M54.2 CERVICALGIA: ICD-10-CM

## 2024-12-17 DIAGNOSIS — M99.01 SEGMENTAL AND SOMATIC DYSFUNCTION OF CERVICAL REGION: Primary | ICD-10-CM

## 2024-12-17 PROCEDURE — 98940 CHIROPRACT MANJ 1-2 REGIONS: CPT | Mod: AT | Performed by: CHIROPRACTOR

## 2024-12-17 PROCEDURE — 99212 OFFICE O/P EST SF 10 MIN: CPT | Mod: 25 | Performed by: CHIROPRACTOR

## 2024-12-24 NOTE — PROGRESS NOTES
Subjective Finding:    Chief compalint: Patient presents with:  Neck Pain , Pain Scale: 4/10, Intensity: sharp, Duration: 2 weeks, Radiating: no.    Date of injury:     Activities that the pain restricts:   Home/household/hobbies/social activities: Yes.  Work duties: Yes.  Sleep: No.  Makes symptoms better: rest.  Makes symptoms worse: cervical extension.  Have you seen anyone else for the symptoms? No.  Work related: No.  Automobile related injury: No.    Objective and Assessment:    Posture Analysis:   High shoulder: .  Head tilt: .  High iliac crest: .  Head carriage: forward.  Thoracic Kyphosis: neutral.  Lumbar Lordosis: neutral.    Lumbar Range of Motion: extension decreased.  Cervical Range of Motion: .  Thoracic Range of Motion: .  Extremity Range of Motion: .    Palpation:   Lev scapulae: sharp pain, referred pain: no    Segmental dysfunction pre-treatment and treatment area: C2, C5, and T3.    Assessment post-treatment:  Cervical: ROM increased.  Thoracic: ROM increased.  Lumbar: ROM increased.    Comments: .      Complicating Factors: .    Procedure(s):  CMT:  88990 Chiropractic manipulative treatment 1-2 regions performed   Cervical: Diversified, See above for level, Side posture and Thoracic: Diversified, See above for level, Prone    Modalities:  None performed this visit    Therapeutic procedures:  None    Plan:  Treatment plan: PRN.  Instructed patient: walk 10 minutes.  Short term goals: increase ROM.  Long term goals: increase ADL.  Prognosis: excellent.

## 2024-12-30 ENCOUNTER — ANCILLARY PROCEDURE (OUTPATIENT)
Dept: MAMMOGRAPHY | Facility: OTHER | Age: 54
End: 2024-12-30
Attending: FAMILY MEDICINE
Payer: COMMERCIAL

## 2024-12-30 ENCOUNTER — TELEPHONE (OUTPATIENT)
Dept: MAMMOGRAPHY | Facility: HOSPITAL | Age: 54
End: 2024-12-30

## 2024-12-30 DIAGNOSIS — Z12.31 VISIT FOR SCREENING MAMMOGRAM: ICD-10-CM

## 2024-12-30 PROCEDURE — 77063 BREAST TOMOSYNTHESIS BI: CPT | Mod: TC | Performed by: RADIOLOGY

## 2024-12-30 PROCEDURE — 77067 SCR MAMMO BI INCL CAD: CPT | Mod: TC | Performed by: RADIOLOGY

## 2025-01-11 ENCOUNTER — HEALTH MAINTENANCE LETTER (OUTPATIENT)
Age: 55
End: 2025-01-11

## 2025-01-15 SDOH — HEALTH STABILITY: PHYSICAL HEALTH: ON AVERAGE, HOW MANY DAYS PER WEEK DO YOU ENGAGE IN MODERATE TO STRENUOUS EXERCISE (LIKE A BRISK WALK)?: 7 DAYS

## 2025-01-15 SDOH — HEALTH STABILITY: PHYSICAL HEALTH: ON AVERAGE, HOW MANY MINUTES DO YOU ENGAGE IN EXERCISE AT THIS LEVEL?: 20 MIN

## 2025-01-15 ASSESSMENT — SOCIAL DETERMINANTS OF HEALTH (SDOH): HOW OFTEN DO YOU GET TOGETHER WITH FRIENDS OR RELATIVES?: THREE TIMES A WEEK

## 2025-01-16 ENCOUNTER — OFFICE VISIT (OUTPATIENT)
Dept: OBGYN | Facility: OTHER | Age: 55
End: 2025-01-16
Attending: NURSE PRACTITIONER
Payer: COMMERCIAL

## 2025-01-16 VITALS
HEART RATE: 64 BPM | HEIGHT: 69 IN | WEIGHT: 265 LBS | BODY MASS INDEX: 39.25 KG/M2 | DIASTOLIC BLOOD PRESSURE: 84 MMHG | SYSTOLIC BLOOD PRESSURE: 118 MMHG

## 2025-01-16 DIAGNOSIS — Z12.4 SCREENING FOR MALIGNANT NEOPLASM OF CERVIX: ICD-10-CM

## 2025-01-16 DIAGNOSIS — Z01.419 WELL WOMAN EXAM: ICD-10-CM

## 2025-01-16 ASSESSMENT — PATIENT HEALTH QUESTIONNAIRE - PHQ9
5. POOR APPETITE OR OVEREATING: NOT AT ALL
SUM OF ALL RESPONSES TO PHQ QUESTIONS 1-9: 0

## 2025-01-16 ASSESSMENT — ANXIETY QUESTIONNAIRES
GAD7 TOTAL SCORE: 0
5. BEING SO RESTLESS THAT IT IS HARD TO SIT STILL: NOT AT ALL
1. FEELING NERVOUS, ANXIOUS, OR ON EDGE: NOT AT ALL
6. BECOMING EASILY ANNOYED OR IRRITABLE: NOT AT ALL
2. NOT BEING ABLE TO STOP OR CONTROL WORRYING: NOT AT ALL
7. FEELING AFRAID AS IF SOMETHING AWFUL MIGHT HAPPEN: NOT AT ALL
GAD7 TOTAL SCORE: 0
3. WORRYING TOO MUCH ABOUT DIFFERENT THINGS: NOT AT ALL

## 2025-01-16 ASSESSMENT — PAIN SCALES - GENERAL: PAINLEVEL_OUTOF10: NO PAIN (0)

## 2025-01-16 NOTE — PROGRESS NOTES
CC:  Annual exam  HPI:  Paulette Flores is a 54 year old female P2 Patient's last menstrual period was 08/18/2013.  Hx of LSH/BSO. Denies vaginal concerns.  Would like to update pap smear.  Mammogram is up to date.  Family Hx reviewed. Labs are up to date.  She has an appointment scheduled to establish care with Dr Wei.   No other c/o.      Past GYN history:  No STD history  STI testing offered?  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    Hypertension     Infections of genitourinary tract antepartum 05/27/2009    Metrorrhagia 12/06/2001    Migraine, unspecified, without mention of intractable migraine without mention of status migrainosus 02/23/2001    oral contraceptive related    Pain in limb 08/22/2002    Unspecified disorder of female genital organs 12/13/2006    Unspecified hemorrhage in early pregnancy, antepartum 12/04/2006       Past Surgical History:   Procedure Laterality Date    BREAST SURGERY      CHOLECYSTECTOMY      COLONOSCOPY      ENDOSCOPY UPPER, COLONOSCOPY, COMBINED N/A 07/15/2022    Procedure: Upper Endoscopy and Colonoscopy with biopsy, with polypectomy;  Surgeon: Jorge Mcduffie MD;  Location: [facility] OR    GYN SURGERY      laparoscopic supracervical hysterectomy,left tube and ovary removal,fulguration of endometriosis 8/28/13--[facility] [place]    LAPAROSCOPIC HYSTERECTOMY SUPRACERVICAL, BILATERAL SALPINGO-OOPHORECTOMY, COMBINED  08/28/2013    Procedure: COMBINED LAPAROSCOPIC HYSTERECTOMY SUPRACERVICAL, SALPINGO-OOPHORECTOMY;  LAPAROSCOPIC SUPRACERVICAL HYSTERECTOMY/LAPAROSCOPIC LEFT SALPINGO-OOPHORECTOMY;  Surgeon: Jerrell Thornton MD;  Location: [facility] OR    LAPAROSCOPIC SALPINGO-OOPHORECTOMY  Right 11/22/2017    Procedure: LAPAROSCOPIC SALPINGO-OOPHORECTOMY;  LAPAROSCOPIC RIGHT SALPINGO-OOPHORECTOMY excision of right pelvic side wall mass;  Surgeon: Jerrell Thornton MD;  Location: [facility] OR    LAPAROSCOPY DIAGNOSTIC (GENERAL)      MAMMOPLASTY REDUCTION      [facility]  2005    WISDOM TEETH         Family History   Problem Relation Age of Onset    Myocardial Infarction Paternal Grandfather 45        MI, cause of death    Cancer Maternal Aunt         Cervical    Hypertension Maternal Grandmother     Osteoporosis Maternal Grandmother     Myocardial Infarction Father         in 60's / smoker     Unknown/Adopted Father     Myocardial Infarction Mother         MI vs CVA - age 68yO    Cerebrovascular Disease Mother     Hypertension Mother     Other Cancer Other         Cervical Cancer       Current Outpatient Medications   Medication Sig Dispense Refill    blood glucose (CONTOUR NEXT TEST) test strip USE TO TEST BLOOD SUGAR TWICE DAILY 200 strip 5    blood glucose monitoring (NO BRAND SPECIFIED) meter device kit Use to test blood sugar 2 times daily or as directed. 1 kit 0    budesonide (PULMICORT) 0.5 MG/2ML neb solution Mix 240 ml Bairon Med Sinus rinse, add 0.5 mg budesonide vial, rinse 1/2 bottle in each nostril twice daily 60 mL 0    ezetimibe (ZETIA) 10 MG tablet Take 1 tablet (10 mg) by mouth daily. 90 tablet 3    ibuprofen (ADVIL/MOTRIN) 800 MG tablet Take 1 tablet (800 mg) by mouth every 8 hours as needed for moderate pain 60 tablet 0    Microlet Lancets MISC Use to test blood glucose twice a day 100 each 11    neomycin-polymyxin-hydrocortisone (CORTISPORIN) 3.5-53914-3 otic suspension Place 3 drops into both ears 4 times daily 10 mL 2    omeprazole (PRILOSEC) 20 MG DR capsule       Semaglutide, 1 MG/DOSE, (OZEMPIC, 1 MG/DOSE,) 4 MG/3ML pen INJECT 1 MG SUBCUTANEOUSLY EVERY 7 DAYS 3 mL 3    valsartan-hydrochlorothiazide (DIOVAN HCT) 80-12.5 MG tablet Take 1 tablet by mouth daily. 90 tablet 3       Allergies: Ace  "inhibitors, Penicillins, Pravachol [pravastatin], Sulfa antibiotics, Zocor [simvastatin], and Adhesive tape    ROS:  Review of systems negative except as stated above.    EXAM:  Blood pressure 118/84, pulse 64, height 1.753 m (5' 9\"), weight 120.2 kg (265 lb), last menstrual period 08/18/2013, not currently breastfeeding.   BMI= Body mass index is 39.13 kg/m .  General - pleasant female in no acute distress.  Breast - no nodularity, asymmetry or nipple discharge bilaterally.  Abdomen - soft, nontender, nondistended, no hepatosplenomegaly.  Pelvic - EG: normal adult female, BUS: within normal limits, Vagina: well rugated, no discharge, Cervix: no lesions or CMT  Rectovaginal - deferred.  Musculoskeletal - no gross deformities.  Neurological - normal strength, sensation, and mental status.      ASSESSMENT/PLAN:  (Z01.419) Well woman exam  Comment:   Plan: return annually and as needed    (Z12.4) Screening for malignant neoplasm of cervix  Comment:   Plan: HPV and Gynecologic Cytology Panel            Discussed exercise and healthy eating, including calcium intake.  She should return to the clinic in one year, or sooner if problems arise.    "

## 2025-01-21 LAB
HPV HR 12 DNA CVX QL NAA+PROBE: NEGATIVE
HPV16 DNA CVX QL NAA+PROBE: NEGATIVE
HPV18 DNA CVX QL NAA+PROBE: NEGATIVE
HUMAN PAPILLOMA VIRUS FINAL DIAGNOSIS: NORMAL

## 2025-01-22 ENCOUNTER — OFFICE VISIT (OUTPATIENT)
Dept: FAMILY MEDICINE | Facility: OTHER | Age: 55
End: 2025-01-22
Attending: STUDENT IN AN ORGANIZED HEALTH CARE EDUCATION/TRAINING PROGRAM
Payer: COMMERCIAL

## 2025-01-22 ENCOUNTER — TELEPHONE (OUTPATIENT)
Dept: CARE COORDINATION | Facility: OTHER | Age: 55
End: 2025-01-22

## 2025-01-22 VITALS
RESPIRATION RATE: 16 BRPM | HEART RATE: 66 BPM | WEIGHT: 265 LBS | OXYGEN SATURATION: 98 % | BODY MASS INDEX: 39.25 KG/M2 | HEIGHT: 69 IN | TEMPERATURE: 98.3 F | SYSTOLIC BLOOD PRESSURE: 117 MMHG | DIASTOLIC BLOOD PRESSURE: 83 MMHG

## 2025-01-22 DIAGNOSIS — B96.89 ACUTE BACTERIAL SINUSITIS: Primary | ICD-10-CM

## 2025-01-22 DIAGNOSIS — J01.90 ACUTE BACTERIAL SINUSITIS: Primary | ICD-10-CM

## 2025-01-22 RX ORDER — DOXYCYCLINE 100 MG/1
100 CAPSULE ORAL 2 TIMES DAILY
Qty: 14 CAPSULE | Refills: 0 | Status: SHIPPED | OUTPATIENT
Start: 2025-01-22 | End: 2025-01-22

## 2025-01-22 RX ORDER — CEFPROZIL 500 MG/1
500 TABLET, FILM COATED ORAL 2 TIMES DAILY
Qty: 20 TABLET | Refills: 0 | Status: SHIPPED | OUTPATIENT
Start: 2025-01-22 | End: 2025-02-01

## 2025-01-22 ASSESSMENT — PAIN SCALES - GENERAL: PAINLEVEL_OUTOF10: MODERATE PAIN (6)

## 2025-01-22 NOTE — PROGRESS NOTES
"  Assessment & Plan     Acute bacterial sinusitis  History of this in the past.  + head congestion, R>L, tooth discomfort, ear discomfort and more recently bloody nasal discharge which signals a sinus infection to her usually  She has had this in the past and was treated for a sinus infection by Dr. Villareal on 2/7  I strongly recommend continued monitoring and supportive cares with nasal irrigation, etc.    I will give her antibiotics but I would like for her to continue self monitoring and start the antibiotics if her symptoms get worse  She agrees to this  Return precautions given  - cefPROZIL (CEFZIL) 500 MG tablet; Take 1 tablet (500 mg) by mouth 2 times daily for 10 days.      BMI  Estimated body mass index is 39.13 kg/m  as calculated from the following:    Height as of this encounter: 1.753 m (5' 9\").    Weight as of this encounter: 120.2 kg (265 lb).         No follow-ups on file.    Juancarlos Caldwell is a 54 year old, presenting for the following health issues:  Illness        1/22/2025     2:31 PM   Additional Questions   Roomed by Delfina Saenz   Accompanied by None         1/22/2025     2:31 PM   Patient Reported Additional Medications   Patient reports taking the following new medications None     History of Present Illness       Reason for visit:  Sinus infection    She eats 2-3 servings of fruits and vegetables daily.She consumes 1 sweetened beverage(s) daily.She exercises with enough effort to increase her heart rate 10 to 19 minutes per day.  She exercises with enough effort to increase her heart rate 4 days per week.   She is taking medications regularly.       Has been using netipot and budesonide rinses- Starts doing them when she felt her symptoms get worse.  About the last few days.    R>L more stuffed.   Has a CPAP  Starts to get bloody    Acute Illness  Acute illness concerns: sinus issues   Onset/Duration: about 4 days   Symptoms:  Fever: No  Chills/Sweats: No  Headache (location?): " "YES  Sinus Pressure: YES  Conjunctivitis:  No  Ear Pain: no  Rhinorrhea: No  Congestion: YES  Sore Throat: YES  Cough: no  Wheeze: No  Decreased Appetite: No  Nausea: No  Vomiting: No  Diarrhea: No  Dysuria/Freq.: No  Dysuria or Hematuria: No  Fatigue/Achiness: YES- fatigue   Sick/Strep Exposure: No  Therapies tried and outcome: Mucinex; teri med sinus rinse         Review of Systems  Constitutional, HEENT, cardiovascular, pulmonary, GI, , musculoskeletal, neuro, skin, endocrine and psych systems are negative, except as otherwise noted.      Objective    /83 (BP Location: Left arm, Patient Position: Sitting, Cuff Size: Adult Large)   Pulse 66   Temp 98.3  F (36.8  C) (Tympanic)   Resp 16   Ht 1.753 m (5' 9\")   Wt 120.2 kg (265 lb)   LMP 08/18/2013   SpO2 98%   BMI 39.13 kg/m    Body mass index is 39.13 kg/m .  Physical Exam   GENERAL: alert and no distress  EYES: Eyes grossly normal to inspection, PERRL and conjunctivae and sclerae normal  HENT: ear canals and TM's normal, nose and mouth without ulcers or lesions.  Tenderness of maxillary sinuses to palpation R>L  NECK: no adenopathy, no asymmetry, masses, or scars  RESP: lungs clear to auscultation - no rales, rhonchi or wheezes  CV: regular rate and rhythm, normal S1 S2, no S3 or S4, no murmur, click or rub, no peripheral edema  ABDOMEN: soft, nontender, no hepatosplenomegaly, no masses and bowel sounds normal  MS: no gross musculoskeletal defects noted, no edema  SKIN: no suspicious lesions or rashes  NEURO: Normal strength and tone, mentation intact and speech normal  PSYCH: mentation appears normal, affect normal/bright          Signed Electronically by: Ramona Moreno MD    "

## 2025-02-05 ENCOUNTER — MYC REFILL (OUTPATIENT)
Dept: FAMILY MEDICINE | Facility: OTHER | Age: 55
End: 2025-02-05

## 2025-02-05 DIAGNOSIS — E11.65 TYPE 2 DIABETES MELLITUS WITH HYPERGLYCEMIA, WITHOUT LONG-TERM CURRENT USE OF INSULIN (H): ICD-10-CM

## 2025-02-05 DIAGNOSIS — I10 ESSENTIAL HYPERTENSION WITH GOAL BLOOD PRESSURE LESS THAN 140/90: ICD-10-CM

## 2025-02-05 DIAGNOSIS — E78.2 MIXED HYPERLIPIDEMIA: ICD-10-CM

## 2025-02-05 DIAGNOSIS — E11.9 TYPE 2 DIABETES MELLITUS WITHOUT COMPLICATION, WITHOUT LONG-TERM CURRENT USE OF INSULIN (H): ICD-10-CM

## 2025-02-05 RX ORDER — VALSARTAN AND HYDROCHLOROTHIAZIDE 80; 12.5 MG/1; MG/1
1 TABLET, FILM COATED ORAL DAILY
Qty: 90 TABLET | Refills: 3 | Status: SHIPPED | OUTPATIENT
Start: 2025-02-05

## 2025-02-05 RX ORDER — SEMAGLUTIDE 1.34 MG/ML
INJECTION, SOLUTION SUBCUTANEOUS
Qty: 3 ML | Refills: 0 | OUTPATIENT
Start: 2025-02-05

## 2025-02-05 RX ORDER — SEMAGLUTIDE 1.34 MG/ML
1 INJECTION, SOLUTION SUBCUTANEOUS
Qty: 3 ML | Refills: 3 | Status: SHIPPED | OUTPATIENT
Start: 2025-02-05

## 2025-02-05 RX ORDER — EZETIMIBE 10 MG/1
10 TABLET ORAL DAILY
Qty: 90 TABLET | Refills: 3 | Status: SHIPPED | OUTPATIENT
Start: 2025-02-05

## 2025-02-05 NOTE — TELEPHONE ENCOUNTER
Patient has appointment with you in April, sending refill requests.   Zetia 10 mg       Last Written Prescription Date:  11/20/2024  Last Fill Quantity: 90,   # refills: 3  Last Office Visit: 1/22/2025  Future Office visit:         Ozempic 1 mg      Last Written Prescription Date:  10/09/2024  Last Fill Quantity: 3,   # refills: 3  Last Office Visit: 1/22/2025  Future Office visit:         Diovan hematocrit 80-12.5 mg       Last Written Prescription Date:  09/13/2024  Last Fill Quantity: 90,   # refills: 3  Last Office Visit: 1/22/2025  Future Office visit:

## 2025-02-06 DIAGNOSIS — G47.33 OBSTRUCTIVE SLEEP APNEA (ADULT) (PEDIATRIC): Primary | ICD-10-CM

## 2025-03-10 NOTE — ANESTHESIA CARE TRANSFER NOTE
Patient requesting refill of their clonazepam 1 mg and clonazepam 0.5 mg; Patient was last seen in March 2025 and has follow up appointment in Sep 2025 with Dr Madison. Pended rx to Dr Madison for signature.    Prudence Quiles RN       Patient: Paulette Flores    Procedure(s):  LAPAROSCOPIC RIGHT SALPINGO-OOPHORECTOMY excision of right pelvic side wall mass - Wound Class: II-Clean Contaminated    Diagnosis: PELVIC PAIN IN FEMALE, ENDOMETRIOSIS  Diagnosis Additional Information: No value filed.    Anesthesia Type:   General, ETT     Note:  Airway :Nasal Cannula  Patient transferred to:PACU  Handoff Report: Identifed the Patient, Identified the Reponsible Provider, Reviewed the pertinent medical history, Discussed the surgical course, Reviewed Intra-OP anesthesia mangement and issues during anesthesia, Set expectations for post-procedure period and Allowed opportunity for questions and acknowledgement of understanding      Vitals: (Last set prior to Anesthesia Care Transfer)    CRNA VITALS  11/22/2017 1157 - 11/22/2017 1235      11/22/2017             Resp Rate (set): 8                Electronically Signed By: JAE Randolph CRNA  November 22, 2017  12:35 PM

## 2025-03-12 ENCOUNTER — MYC REFILL (OUTPATIENT)
Dept: FAMILY MEDICINE | Facility: OTHER | Age: 55
End: 2025-03-12

## 2025-03-12 DIAGNOSIS — I10 ESSENTIAL HYPERTENSION WITH GOAL BLOOD PRESSURE LESS THAN 140/90: ICD-10-CM

## 2025-03-13 RX ORDER — VALSARTAN AND HYDROCHLOROTHIAZIDE 80; 12.5 MG/1; MG/1
1 TABLET, FILM COATED ORAL DAILY
Qty: 90 TABLET | Refills: 3 | Status: SHIPPED | OUTPATIENT
Start: 2025-03-13

## 2025-03-13 NOTE — TELEPHONE ENCOUNTER
valsartan-hydrochlorothiazide (DIOVAN HCT) 80-12.5 MG tablet       Last Written Prescription Date:  2/5/2025  Last Fill Quantity: 90,   # refills: 3  Last Office Visit: 1/22/2025  Future Office visit:     Patient comment: Dr Pradhan please order this medication -I have an appt next month with you:)

## 2025-04-20 ENCOUNTER — HEALTH MAINTENANCE LETTER (OUTPATIENT)
Age: 55
End: 2025-04-20

## 2025-04-23 NOTE — PROGRESS NOTES
Assessment & Plan     Type 2 diabetes mellitus with other specified complication, without long-term current use of insulin (H) / Type 2 diabetes mellitus with hyperglycemia, without long-term current use of insulin (H)  Control is very good. Tolerating ozempic well, recommend increase in dose for weight and liver benefit. Consider transition to mounjaro, if not tolerated or if stable results.   - Semaglutide, 2 MG/DOSE, (OZEMPIC) 8 MG/3ML pen  Dispense: 9 mL; Refill: 3  - Albumin Random Urine Quantitative with Creat Ratio  - Albumin Random Urine Quantitative with Creat Ratio  - Hemoglobin A1c  - Hemoglobin A1c    ARROYO (nonalcoholic steatohepatitis)  Noted on imaging and with elevated lfts. These recently normalized which is great. Unclear if secondary cause work up done in the past. Will review record and update labs at follow up if needed. Discussed briefly common risk factors for fatty liver and DM2, recommendations for managing and indication for increase in ozempic dosage.   - Comprehensive metabolic panel (BMP + Alb, Alk Phos, ALT, AST, Total. Bili, TP)  - Comprehensive metabolic panel (BMP + Alb, Alk Phos, ALT, AST, Total. Bili, TP)    Essential hypertension with goal blood pressure less than 140/90  Controlled  - Comprehensive metabolic panel (BMP + Alb, Alk Phos, ALT, AST, Total. Bili, TP)  - Comprehensive metabolic panel (BMP + Alb, Alk Phos, ALT, AST, Total. Bili, TP)    Insomnia, unspecified type  More recently, difficulty falling and staying asleep. Melatonin, not effective. Benadryl is, but not recommended long term. Trial of Mag glycinate nightly.   - Vitamin D Deficiency  - Vitamin B12  - Magnesium  - Vitamin D Deficiency  - Vitamin B12  - Magnesium    Early menopause  Noting night sweats in addition to insomnia. Note she went into surgical menopause around age 46, so this is a bit out from that. But menopause could certainly also cause some insomnia. With pts diabetes, > 10years out from menopause  "pt is higher risk for HRT, however, she is young. Will discuss with gyn regarding any insights. Also briefly discussed early dexa screening - to be ordered at annual.    BMI  Estimated body mass index is 38.6 kg/m  as calculated from the following:    Height as of this encounter: 1.753 m (5' 9\").    Weight as of this encounter: 118.6 kg (261 lb 6.4 oz).       Follow-up  No follow-ups on file.    Juancarlos Caldwell is a 54 year old, presenting for the following health issues:  Diabetes, Hypertension, and Lipids        4/25/2025     3:42 PM   Additional Questions   Roomed by Danielle Robbins   Accompanied by self       Via the Health Maintenance questionnaire, the patient has reported the following services have been completed , this information has been sent to the abstraction team.  History of Present Illness       Diabetes:   She presents for follow up of diabetes.  She is checking home blood glucose a few times a month.   She checks blood glucose before and after meals.  Blood glucose is never over 200 and never under 70. She is aware of hypoglycemia symptoms including shakiness and dizziness.    She has no concerns regarding her diabetes at this time.   She is not experiencing numbness or burning in feet, excessive thirst, blurry vision, weight changes or redness, sores or blisters on feet. The patient has had a diabetic eye exam in the last 12 months. Eye exam performed on 4/24/2025. Location of last eye exam [facility].        Hyperlipidemia:  She presents for follow up of hyperlipidemia.   She is taking medication to lower cholesterol. She is not having myalgia or other side effects to statin medications.    Hypertension: She presents for follow up of hypertension.  She does check blood pressure  regularly outside of the clinic. Outpatient blood pressures have not been over 140/90. She follows a low salt diet.     She eats 2-3 servings of fruits and vegetables daily.She consumes 1 sweetened beverage(s) " daily.She exercises with enough effort to increase her heart rate 30 to 60 minutes per day.  She exercises with enough effort to increase her heart rate 5 days per week.   She is taking medications regularly.        Diabetes Follow-up    How often are you checking your blood sugar? A few times a month  What time of day are you checking your blood sugars (select all that apply)?  Before and after meals  Have you had any blood sugars above 200?  No  Have you had any blood sugars below 70?  No  What symptoms do you notice when your blood sugar is low?  Shaky and Dizzy  What concerns do you have today about your diabetes? None   Do you have any of these symptoms? (Select all that apply)  No numbness or tingling in feet.  No redness, sores or blisters on feet.  No complaints of excessive thirst.  No reports of blurry vision.  No significant changes to weight.    Hyperlipidemia Follow-Up    Are you regularly taking any medication or supplement to lower your cholesterol?   Yes- Zetia  Are you having muscle aches or other side effects that you think could be caused by your cholesterol lowering medication?  No    Hypertension Follow-up    Do you check your blood pressure regularly outside of the clinic? Yes   Are you following a low salt diet? Yes  Are your blood pressures ever more than 140 on the top number (systolic) OR more   than 90 on the bottom number (diastolic), for example 140/90? No    BP Readings from Last 2 Encounters:   04/25/25 122/80   01/22/25 117/83     Hemoglobin A1C (%)   Date Value   04/25/2025 5.9 (H)   09/25/2024 5.9 (H)     LDL Cholesterol Calculated (mg/dL)   Date Value   03/14/2024 147 (H)   11/07/2023 174 (H)   08/26/2020 83   03/25/2020 68       Insomnia  Onset/Duration: months  Description:   Frequency of insomnia:  nightly  Time to fall asleep (sleep latency): 1.5 hours  Middle of night awakening:  YES  Early morning awakening:  YES  Progression of Symptoms:  same  Accompanying Signs &  "Symptoms:  Daytime sleepiness/napping: No  Excessive snoring/apnea: No  Restless legs: No  Waking to urinate: No  Chronic pain:  No  Depression symptoms (if yes, do PHQ9): No  Anxiety symptoms (if yes, do KIRSTEN-7): No  History:  Prior Insomnia: No  New stressful situation: YES  Precipitating factors:   Caffeine intake: YES  OTC decongestants: No  Any new medications: No  Alleviating factors:  Self medicating (alcohol, etc.):  No  Stress-reduction (exercise, yoga, meditation etc): YES  Therapies tried and outcome: Benadryl -  effective and melatonin - not helpful      Review of Systems  Constitutional, neuro, ENT, endocrine, pulmonary, cardiac, gastrointestinal, genitourinary, musculoskeletal, integument and psychiatric systems are negative, except as otherwise noted.      Objective    /80   Pulse 66   Temp 98.6  F (37  C) (Tympanic)   Resp 16   Ht 1.753 m (5' 9\")   Wt 118.6 kg (261 lb 6.4 oz)   LMP 08/18/2013   SpO2 98%   BMI 38.60 kg/m    Body mass index is 38.6 kg/m .    Physical Exam   GENERAL: alert and no distress  RESP: lungs clear to auscultation - no rales, rhonchi or wheezes  CV: regular rates and rhythm, normal S1 S2, no S3 or S4, no murmur, click or rub, and no peripheral edema  MS: no gross musculoskeletal defects noted, no edema  SKIN: no suspicious lesions or rashes  PSYCH: mentation appears normal, affect normal/bright    Results for orders placed or performed in visit on 04/25/25   Hemoglobin A1c     Status: Abnormal   Result Value Ref Range    Estimated Average Glucose 123 (H) <117 mg/dL    Hemoglobin A1C 5.9 (H) <5.7 %   Comprehensive metabolic panel (BMP + Alb, Alk Phos, ALT, AST, Total. Bili, TP)     Status: Abnormal   Result Value Ref Range    Sodium 139 135 - 145 mmol/L    Potassium 4.0 3.4 - 5.3 mmol/L    Carbon Dioxide (CO2) 23 22 - 29 mmol/L    Anion Gap 11 7 - 15 mmol/L    Urea Nitrogen 15.9 6.0 - 20.0 mg/dL    Creatinine 0.81 0.51 - 0.95 mg/dL    GFR Estimate 86 >60 " mL/min/1.73m2    Calcium 9.0 8.8 - 10.4 mg/dL    Chloride 105 98 - 107 mmol/L    Glucose 101 (H) 70 - 99 mg/dL    Alkaline Phosphatase 64 40 - 150 U/L    AST 26 0 - 45 U/L    ALT 46 0 - 50 U/L    Protein Total 7.1 6.4 - 8.3 g/dL    Albumin 4.2 3.5 - 5.2 g/dL    Bilirubin Total 0.3 <=1.2 mg/dL   Vitamin D Deficiency     Status: Normal   Result Value Ref Range    Vitamin D, Total (25-Hydroxy) 23 20 - 50 ng/mL    Narrative    Season, race, dietary intake, and treatment affect the concentration of 25-hydroxy-Vitamin D. Values may decrease during winter months and increase during summer months.    Vitamin D determination is routinely performed by an immunoassay specific for 25 hydroxyvitamin D3.  If an individual is on vitamin D2(ergocalciferol) supplementation, please specify 25 OH vitamin D2 and D3 level determination by LCMSMS test VITD23.     Vitamin B12     Status: Normal   Result Value Ref Range    Vitamin B12 459 232 - 1,245 pg/mL   Magnesium     Status: Normal   Result Value Ref Range    Magnesium 2.0 1.7 - 2.3 mg/dL   Albumin Random Urine Quantitative with Creat Ratio     Status: None   Result Value Ref Range    Creatinine Urine mg/dL 223.0 mg/dL    Albumin Urine mg/L 44.1 mg/L    Albumin Urine mg/g Cr 19.78 0.00 - 25.00 mg/g Cr         Signed Electronically by: Silke Pradhan MD    The longitudinal plan of care for the diagnosis(es)/condition(s) as documented were addressed during this visit. Due to the added complexity in care, I will continue to support Paulette in the subsequent management and with ongoing continuity of care.

## 2025-04-24 ENCOUNTER — TRANSFERRED RECORDS (OUTPATIENT)
Dept: HEALTH INFORMATION MANAGEMENT | Facility: CLINIC | Age: 55
End: 2025-04-24
Payer: COMMERCIAL

## 2025-04-25 ENCOUNTER — OFFICE VISIT (OUTPATIENT)
Dept: FAMILY MEDICINE | Facility: OTHER | Age: 55
End: 2025-04-25
Attending: FAMILY MEDICINE
Payer: COMMERCIAL

## 2025-04-25 VITALS
WEIGHT: 261.4 LBS | OXYGEN SATURATION: 98 % | HEIGHT: 69 IN | BODY MASS INDEX: 38.72 KG/M2 | HEART RATE: 66 BPM | TEMPERATURE: 98.6 F | SYSTOLIC BLOOD PRESSURE: 122 MMHG | DIASTOLIC BLOOD PRESSURE: 80 MMHG | RESPIRATION RATE: 16 BRPM

## 2025-04-25 DIAGNOSIS — E11.65 TYPE 2 DIABETES MELLITUS WITH HYPERGLYCEMIA, WITHOUT LONG-TERM CURRENT USE OF INSULIN (H): ICD-10-CM

## 2025-04-25 DIAGNOSIS — G47.00 INSOMNIA, UNSPECIFIED TYPE: ICD-10-CM

## 2025-04-25 DIAGNOSIS — E28.319 EARLY MENOPAUSE: ICD-10-CM

## 2025-04-25 DIAGNOSIS — K75.81 NASH (NONALCOHOLIC STEATOHEPATITIS): ICD-10-CM

## 2025-04-25 DIAGNOSIS — E11.69 TYPE 2 DIABETES MELLITUS WITH OTHER SPECIFIED COMPLICATION, WITHOUT LONG-TERM CURRENT USE OF INSULIN (H): Primary | ICD-10-CM

## 2025-04-25 DIAGNOSIS — I10 ESSENTIAL HYPERTENSION WITH GOAL BLOOD PRESSURE LESS THAN 140/90: ICD-10-CM

## 2025-04-25 LAB
ALBUMIN SERPL BCG-MCNC: 4.2 G/DL (ref 3.5–5.2)
ALP SERPL-CCNC: 64 U/L (ref 40–150)
ALT SERPL W P-5'-P-CCNC: 46 U/L (ref 0–50)
ANION GAP SERPL CALCULATED.3IONS-SCNC: 11 MMOL/L (ref 7–15)
AST SERPL W P-5'-P-CCNC: 26 U/L (ref 0–45)
BILIRUB SERPL-MCNC: 0.3 MG/DL
BUN SERPL-MCNC: 15.9 MG/DL (ref 6–20)
CALCIUM SERPL-MCNC: 9 MG/DL (ref 8.8–10.4)
CHLORIDE SERPL-SCNC: 105 MMOL/L (ref 98–107)
CREAT SERPL-MCNC: 0.81 MG/DL (ref 0.51–0.95)
CREAT UR-MCNC: 223 MG/DL
EGFRCR SERPLBLD CKD-EPI 2021: 86 ML/MIN/1.73M2
EST. AVERAGE GLUCOSE BLD GHB EST-MCNC: 123 MG/DL
GLUCOSE SERPL-MCNC: 101 MG/DL (ref 70–99)
HBA1C MFR BLD: 5.9 %
HCO3 SERPL-SCNC: 23 MMOL/L (ref 22–29)
MAGNESIUM SERPL-MCNC: 2 MG/DL (ref 1.7–2.3)
MICROALBUMIN UR-MCNC: 44.1 MG/L
MICROALBUMIN/CREAT UR: 19.78 MG/G CR (ref 0–25)
POTASSIUM SERPL-SCNC: 4 MMOL/L (ref 3.4–5.3)
PROT SERPL-MCNC: 7.1 G/DL (ref 6.4–8.3)
SODIUM SERPL-SCNC: 139 MMOL/L (ref 135–145)

## 2025-04-25 PROCEDURE — 82607 VITAMIN B-12: CPT | Performed by: FAMILY MEDICINE

## 2025-04-25 PROCEDURE — 3074F SYST BP LT 130 MM HG: CPT | Performed by: FAMILY MEDICINE

## 2025-04-25 PROCEDURE — 80053 COMPREHEN METABOLIC PANEL: CPT | Performed by: FAMILY MEDICINE

## 2025-04-25 PROCEDURE — 83735 ASSAY OF MAGNESIUM: CPT | Performed by: FAMILY MEDICINE

## 2025-04-25 PROCEDURE — 82570 ASSAY OF URINE CREATININE: CPT | Performed by: FAMILY MEDICINE

## 2025-04-25 PROCEDURE — 1126F AMNT PAIN NOTED NONE PRSNT: CPT | Performed by: FAMILY MEDICINE

## 2025-04-25 PROCEDURE — 3079F DIAST BP 80-89 MM HG: CPT | Performed by: FAMILY MEDICINE

## 2025-04-25 PROCEDURE — G2211 COMPLEX E/M VISIT ADD ON: HCPCS | Performed by: FAMILY MEDICINE

## 2025-04-25 PROCEDURE — 36415 COLL VENOUS BLD VENIPUNCTURE: CPT | Performed by: FAMILY MEDICINE

## 2025-04-25 PROCEDURE — 83036 HEMOGLOBIN GLYCOSYLATED A1C: CPT | Performed by: FAMILY MEDICINE

## 2025-04-25 PROCEDURE — 99214 OFFICE O/P EST MOD 30 MIN: CPT | Performed by: FAMILY MEDICINE

## 2025-04-25 PROCEDURE — 82043 UR ALBUMIN QUANTITATIVE: CPT | Performed by: FAMILY MEDICINE

## 2025-04-25 PROCEDURE — 82306 VITAMIN D 25 HYDROXY: CPT | Performed by: FAMILY MEDICINE

## 2025-04-25 ASSESSMENT — PAIN SCALES - GENERAL: PAINLEVEL_OUTOF10: NO PAIN (0)

## 2025-04-25 NOTE — Clinical Note
Hi Dr. Cuba,   I have a curbside question for you regarding HRT.  In general I have heard concerns regarding HRT > 10 years out from menopause with increased vascular risk, which I can understand. This is my second pt now who is >  or close to 10 years out from menopause, but menopause was early from surgery or other cause. Is this something you would consider for these women in general?  This pt had surgical menopause at 46, no hormonal treatment after, some mild menopause sx at that time. Now with hot flashes, insomnia (looking at our non hormonal options/causes). She is higher risk (obesity, Dm2, ARROYO) from vasc standpoint. So, less sure with her, but wondering if in the future, a consultation would be worth her time at all.   Thanks for any insight.   Thanks,  Silke Pradhan MD

## 2025-04-26 LAB
VIT B12 SERPL-MCNC: 459 PG/ML (ref 232–1245)
VIT D+METAB SERPL-MCNC: 23 NG/ML (ref 20–50)

## 2025-07-21 ENCOUNTER — MYC REFILL (OUTPATIENT)
Dept: FAMILY MEDICINE | Facility: OTHER | Age: 55
End: 2025-07-21

## 2025-07-21 ENCOUNTER — TELEPHONE (OUTPATIENT)
Dept: FAMILY MEDICINE | Facility: OTHER | Age: 55
End: 2025-07-21

## 2025-07-21 DIAGNOSIS — E11.69 TYPE 2 DIABETES MELLITUS WITH OTHER SPECIFIED COMPLICATION, WITHOUT LONG-TERM CURRENT USE OF INSULIN (H): ICD-10-CM

## 2025-07-21 NOTE — TELEPHONE ENCOUNTER
Reason for call:  Medication      Have you contacted your pharmacy? Yes   If patient has contacted Pharmacy and it has been over 72hrs, continue to #2  Medication Semaglutide, 2 MG/DOSE, (OZEMPIC) 8 MG/3ML pen   What Pharmacy do you use? [facility]      (Please note that the turn-around-time for prescriptions is 72 business hours; I am sending your request at this time. SEND TO appropriate Care Team Pool )

## 2025-07-21 NOTE — TELEPHONE ENCOUNTER
Semaglutide      Last Written Prescription Date:  4/25/25  Last Fill Quantity: 9ml,   # refills: 3  Last Office Visit: 4/25/25  Future Office visit:    Next 5 appointments (look out 90 days)      Jul 30, 2025 11:30 AM  (Arrive by 11:15 AM)  Provider Visit with Silke Pradhan MD  [facility] [place] ([facility] [place] ) [address]  [place] [address]  [phone]               Dose increase?

## 2025-07-28 ENCOUNTER — MYC MEDICAL ADVICE (OUTPATIENT)
Dept: FAMILY MEDICINE | Facility: OTHER | Age: 55
End: 2025-07-28

## 2025-08-14 ENCOUNTER — MYC REFILL (OUTPATIENT)
Dept: FAMILY MEDICINE | Facility: OTHER | Age: 55
End: 2025-08-14

## 2025-08-14 DIAGNOSIS — E11.65 TYPE 2 DIABETES MELLITUS WITH HYPERGLYCEMIA, WITHOUT LONG-TERM CURRENT USE OF INSULIN (H): ICD-10-CM

## 2025-08-14 DIAGNOSIS — I10 ESSENTIAL HYPERTENSION WITH GOAL BLOOD PRESSURE LESS THAN 140/90: ICD-10-CM

## 2025-08-14 DIAGNOSIS — E78.2 MIXED HYPERLIPIDEMIA: ICD-10-CM

## 2025-08-14 DIAGNOSIS — E11.9 TYPE 2 DIABETES MELLITUS WITHOUT COMPLICATION, WITHOUT LONG-TERM CURRENT USE OF INSULIN (H): ICD-10-CM

## 2025-08-14 RX ORDER — VALSARTAN AND HYDROCHLOROTHIAZIDE 80; 12.5 MG/1; MG/1
1 TABLET, FILM COATED ORAL DAILY
Qty: 90 TABLET | Refills: 3 | OUTPATIENT
Start: 2025-08-14

## 2025-08-14 RX ORDER — LANCETS
EACH MISCELLANEOUS
Qty: 100 EACH | Refills: 11 | Status: SHIPPED | OUTPATIENT
Start: 2025-08-14

## 2025-08-14 RX ORDER — EZETIMIBE 10 MG/1
10 TABLET ORAL DAILY
Qty: 90 TABLET | Refills: 3 | OUTPATIENT
Start: 2025-08-14

## (undated) DEVICE — SOL-NACL 0.9% 1000ML

## (undated) DEVICE — NDL-INSUFFLATION 120MM

## (undated) DEVICE — LIGHT HANDLE COVER

## (undated) DEVICE — SUTURE-VICRYL 0 UR-6 J603H

## (undated) DEVICE — CATH-URETHRAL 14FR

## (undated) DEVICE — TUBING-SUCTION 20FT

## (undated) DEVICE — TRAY-SKIN PREP POVIDONE/IODINE

## (undated) DEVICE — CANISTER-SUCTION 2000CC

## (undated) DEVICE — BLADE-SURG CLIPPER

## (undated) DEVICE — IRRIGATION-NACL 1000ML

## (undated) DEVICE — TUBING-INSUFFLATION/LAPAROFLATOR W/FILTER

## (undated) DEVICE — TROCAR-11X100MM BLADED W/FIXATION

## (undated) DEVICE — IRRIGATION-H2O 1000ML

## (undated) DEVICE — PACK-LAP LAVH-CUSTOM

## (undated) DEVICE — LIGASURE-5MM BLUNT TIP LAPAROSCOPIC

## (undated) DEVICE — PUNCTURE CLOSURE DEVICE

## (undated) DEVICE — APPLICATOR-CHLORAPREP 26ML TINTED CHG 2%+ 70% IPA-SURGICAL

## (undated) DEVICE — LABEL-STERILE PREPRINTED FOR OR

## (undated) DEVICE — SCISSOR-ENDOPATH 5MM CURVED

## (undated) DEVICE — TROCAR-5X100MM BLADED W/FIXATION

## (undated) DEVICE — GLV-7.5 PROTEXIS PI CLASSIC LF/PF

## (undated) DEVICE — INZII RETRIEVAL SYSTEM-10MM

## (undated) DEVICE — SUCTION-IRRIGATION STRYKEFLOW II (STRYKER)

## (undated) DEVICE — FORCEP-COLON BIOPSY LARGE W/NEEDLE 240CM

## (undated) DEVICE — SCD SLEEVE-THIGH REG.

## (undated) DEVICE — TOWEL-OR DISP 4PKS

## (undated) DEVICE — GLV-8.5 BIOGEL LATEX

## (undated) DEVICE — UTERINE MANIPULATOR-KRONNER MANIPUJECTOR

## (undated) DEVICE — CONNECTOR-ERBEFLO 2 PORT

## (undated) DEVICE — TOPICAL SKIN ADHESIVE EXOFIN

## (undated) DEVICE — SPONGE-LAPAROTOMY PADS 18 X 18

## (undated) DEVICE — CAUTERY PAD-POLYHESIVE II ADULT

## (undated) RX ORDER — TRANEXAMIC ACID 100 MG/ML
INJECTION, SOLUTION INTRAVENOUS
Status: DISPENSED
Start: 2017-11-22

## (undated) RX ORDER — NEOSTIGMINE METHYLSULFATE 1 MG/ML
VIAL (ML) INJECTION
Status: DISPENSED
Start: 2017-11-22

## (undated) RX ORDER — GLYCOPYRROLATE 0.2 MG/ML
INJECTION, SOLUTION INTRAMUSCULAR; INTRAVENOUS
Status: DISPENSED
Start: 2017-11-22

## (undated) RX ORDER — PROPOFOL 10 MG/ML
INJECTION, EMULSION INTRAVENOUS
Status: DISPENSED
Start: 2017-11-22

## (undated) RX ORDER — ROCURONIUM BROMIDE 50 MG/5 ML
SYRINGE (ML) INTRAVENOUS
Status: DISPENSED
Start: 2017-11-22

## (undated) RX ORDER — ONDANSETRON 2 MG/ML
INJECTION INTRAMUSCULAR; INTRAVENOUS
Status: DISPENSED
Start: 2017-11-22

## (undated) RX ORDER — LIDOCAINE HYDROCHLORIDE 20 MG/ML
INJECTION, SOLUTION EPIDURAL; INFILTRATION; INTRACAUDAL; PERINEURAL
Status: DISPENSED
Start: 2017-11-22

## (undated) RX ORDER — PROPOFOL 10 MG/ML
INJECTION, EMULSION INTRAVENOUS
Status: DISPENSED
Start: 2022-07-15

## (undated) RX ORDER — DEXAMETHASONE SODIUM PHOSPHATE 10 MG/ML
INJECTION, SOLUTION INTRAMUSCULAR; INTRAVENOUS
Status: DISPENSED
Start: 2017-11-22

## (undated) RX ORDER — FENTANYL CITRATE 50 UG/ML
INJECTION, SOLUTION INTRAMUSCULAR; INTRAVENOUS
Status: DISPENSED
Start: 2017-11-22

## (undated) RX ORDER — EPHEDRINE SULFATE 50 MG/ML
INJECTION, SOLUTION INTRAMUSCULAR; INTRAVENOUS; SUBCUTANEOUS
Status: DISPENSED
Start: 2017-11-22

## (undated) RX ORDER — LIDOCAINE HYDROCHLORIDE 20 MG/ML
INJECTION, SOLUTION EPIDURAL; INFILTRATION; INTRACAUDAL; PERINEURAL
Status: DISPENSED
Start: 2022-07-15